# Patient Record
Sex: FEMALE | Race: BLACK OR AFRICAN AMERICAN | NOT HISPANIC OR LATINO
[De-identification: names, ages, dates, MRNs, and addresses within clinical notes are randomized per-mention and may not be internally consistent; named-entity substitution may affect disease eponyms.]

---

## 2022-07-20 ENCOUNTER — APPOINTMENT (OUTPATIENT)
Dept: THORACIC SURGERY | Facility: CLINIC | Age: 67
End: 2022-07-20

## 2022-07-20 VITALS
HEART RATE: 102 BPM | OXYGEN SATURATION: 92 % | RESPIRATION RATE: 16 BRPM | DIASTOLIC BLOOD PRESSURE: 79 MMHG | BODY MASS INDEX: 25.52 KG/M2 | HEIGHT: 63 IN | WEIGHT: 144 LBS | SYSTOLIC BLOOD PRESSURE: 121 MMHG

## 2022-07-20 DIAGNOSIS — Z86.79 PERSONAL HISTORY OF OTHER DISEASES OF THE CIRCULATORY SYSTEM: ICD-10-CM

## 2022-07-20 DIAGNOSIS — Z82.49 FAMILY HISTORY OF ISCHEMIC HEART DISEASE AND OTHER DISEASES OF THE CIRCULATORY SYSTEM: ICD-10-CM

## 2022-07-20 DIAGNOSIS — Z87.891 PERSONAL HISTORY OF NICOTINE DEPENDENCE: ICD-10-CM

## 2022-07-20 PROCEDURE — 99245 OFF/OP CONSLTJ NEW/EST HI 55: CPT

## 2022-07-20 RX ORDER — LOSARTAN POTASSIUM 100 MG/1
100 TABLET, FILM COATED ORAL
Refills: 0 | Status: ACTIVE | COMMUNITY

## 2022-07-22 ENCOUNTER — OUTPATIENT (OUTPATIENT)
Dept: OUTPATIENT SERVICES | Facility: HOSPITAL | Age: 67
LOS: 1 days | End: 2022-07-22

## 2022-07-22 VITALS
HEIGHT: 63 IN | OXYGEN SATURATION: 97 % | WEIGHT: 141.1 LBS | DIASTOLIC BLOOD PRESSURE: 71 MMHG | RESPIRATION RATE: 16 BRPM | TEMPERATURE: 98 F | SYSTOLIC BLOOD PRESSURE: 112 MMHG | HEART RATE: 83 BPM

## 2022-07-22 DIAGNOSIS — Z90.49 ACQUIRED ABSENCE OF OTHER SPECIFIED PARTS OF DIGESTIVE TRACT: Chronic | ICD-10-CM

## 2022-07-22 DIAGNOSIS — J98.59 OTHER DISEASES OF MEDIASTINUM, NOT ELSEWHERE CLASSIFIED: ICD-10-CM

## 2022-07-22 DIAGNOSIS — E05.90 THYROTOXICOSIS, UNSPECIFIED WITHOUT THYROTOXIC CRISIS OR STORM: ICD-10-CM

## 2022-07-22 DIAGNOSIS — R91.8 OTHER NONSPECIFIC ABNORMAL FINDING OF LUNG FIELD: ICD-10-CM

## 2022-07-22 LAB
ANION GAP SERPL CALC-SCNC: 10 MMOL/L — SIGNIFICANT CHANGE UP (ref 7–14)
BLD GP AB SCN SERPL QL: NEGATIVE — SIGNIFICANT CHANGE UP
BUN SERPL-MCNC: 21 MG/DL — SIGNIFICANT CHANGE UP (ref 7–23)
CALCIUM SERPL-MCNC: 9.6 MG/DL — SIGNIFICANT CHANGE UP (ref 8.4–10.5)
CHLORIDE SERPL-SCNC: 99 MMOL/L — SIGNIFICANT CHANGE UP (ref 98–107)
CO2 SERPL-SCNC: 27 MMOL/L — SIGNIFICANT CHANGE UP (ref 22–31)
CREAT SERPL-MCNC: 0.98 MG/DL — SIGNIFICANT CHANGE UP (ref 0.5–1.3)
EGFR: 64 ML/MIN/1.73M2 — SIGNIFICANT CHANGE UP
GLUCOSE SERPL-MCNC: 88 MG/DL — SIGNIFICANT CHANGE UP (ref 70–99)
HCT VFR BLD CALC: 41.9 % — SIGNIFICANT CHANGE UP (ref 34.5–45)
HGB BLD-MCNC: 13.5 G/DL — SIGNIFICANT CHANGE UP (ref 11.5–15.5)
MCHC RBC-ENTMCNC: 30.1 PG — SIGNIFICANT CHANGE UP (ref 27–34)
MCHC RBC-ENTMCNC: 32.2 GM/DL — SIGNIFICANT CHANGE UP (ref 32–36)
MCV RBC AUTO: 93.3 FL — SIGNIFICANT CHANGE UP (ref 80–100)
NRBC # BLD: 0 /100 WBCS — SIGNIFICANT CHANGE UP
NRBC # FLD: 0 K/UL — SIGNIFICANT CHANGE UP
PLATELET # BLD AUTO: 279 K/UL — SIGNIFICANT CHANGE UP (ref 150–400)
POTASSIUM SERPL-MCNC: 4.4 MMOL/L — SIGNIFICANT CHANGE UP (ref 3.5–5.3)
POTASSIUM SERPL-SCNC: 4.4 MMOL/L — SIGNIFICANT CHANGE UP (ref 3.5–5.3)
RBC # BLD: 4.49 M/UL — SIGNIFICANT CHANGE UP (ref 3.8–5.2)
RBC # FLD: 13.7 % — SIGNIFICANT CHANGE UP (ref 10.3–14.5)
RH IG SCN BLD-IMP: POSITIVE — SIGNIFICANT CHANGE UP
SODIUM SERPL-SCNC: 136 MMOL/L — SIGNIFICANT CHANGE UP (ref 135–145)
WBC # BLD: 7.82 K/UL — SIGNIFICANT CHANGE UP (ref 3.8–10.5)
WBC # FLD AUTO: 7.82 K/UL — SIGNIFICANT CHANGE UP (ref 3.8–10.5)

## 2022-07-22 PROCEDURE — 93010 ELECTROCARDIOGRAM REPORT: CPT

## 2022-07-22 RX ORDER — SODIUM CHLORIDE 9 MG/ML
1000 INJECTION, SOLUTION INTRAVENOUS
Refills: 0 | Status: DISCONTINUED | OUTPATIENT
Start: 2022-07-29 | End: 2022-08-12

## 2022-07-22 NOTE — H&P PST ADULT - HISTORY OF PRESENT ILLNESS
Pt. is a 67 yo female that had unexplained weight loss.  During evaluation a lung mass was detected.

## 2022-07-22 NOTE — H&P PST ADULT - PROBLEM SELECTOR PLAN 1
Pt. is scheduled for a bronchoscopy, EBUS biopsy with cytology, possible mediastinoscopy 7/29/22.  Pt. verbalized understanding of instructions and that Chlorhexidine is for external use.

## 2022-07-22 NOTE — H&P PST ADULT - MS GEN HX ROS MEA POS PC
Hypotensive and tachycardic  Currently on Levophed   Lt femoral A-line placed right knee pain/arthritis

## 2022-07-22 NOTE — H&P PST ADULT - PROBLEM SELECTOR PLAN 2
Pt. is on Methimazole.  Pt. had TFT's drawn 4/2022.  Pt. states they're usually drawn every 3 months.  Pt. to be evaluated by PMD to have TFT's drawn.  Pt. states she will see her PMD 7/25/22.

## 2022-07-25 NOTE — HISTORY OF PRESENT ILLNESS
[FreeTextEntry1] : Ms. LEXII KC, 66 year old female, Former Smoker (1 PPD x 40 years; quit June, 2022), w/ hx of HTN, Hyperthyroidism (On Methimazole), Thyroid nodules. June, 2022, presented to PCP ( Dr. Supa Liriano) for neck swelling as well as 10lb weight loss over the past year. Workup demonstrating Large mediastinal mass measuring up to 7 cm extending into the right hilum and right upper lobe parenchyma. Mass narrows and encases the lobar right superior PA , segmental branches and SVC. Furthermore, There is abutment of the aortic arch branches by the mass. Referred to office for surgical evaluation. \par \par CT Chest on 7/7/22:\par - Large 6.1 x 7 x 6.9 cm mediastinal mass extending into the right hilum and right upper lung parenchyma \par - Narrowing and encasement of the RUL pulmonary artery and segmental and subsegmental branches by the mass (Series 602, Image 64)\par mass extends into the superior mediastinum and abuts the brachiocephalic artery and left common carotid artery without emily invasion (Series 7, image 34)\par - There is encasement of the superior vena cava (Series 602, image 72)\par -  5 x 4 mm soft tissue within the RUL bronchus (4, 137), possibly extension from the right mediastinal/hilar mass. There is narrowing of the upper segmental bronchi anteriorly. \par * Multiple pulmonary nodules: \par - 1.4 x 1.1 cm posterior RUL (3, 135)\par - 0.4 cm RUL (3, 90)\par - 0.3 RUL (3, 79)\par - 0.4 cm LLL (3, 172)\par - 0.4 LLL (3, 193)\par \par Patient presents to office for evaluation.  Today, patient denies worsening SOB, chest pain, cough, hemoptysis, fever, chills, night sweats, lightheadedness or dizziness.\par

## 2022-07-25 NOTE — ASSESSMENT
[FreeTextEntry1] : Ms. LEXII KC, 66 year old female, Former Smoker (1 PPD x 40 years; quit June, 2022), w/ hx of HTN, Hyperthyroidism (On Methimazole), Thyroid nodules. June, 2022, presented to PCP ( Dr. Supa Liriano) for neck swelling as well as 10lb weight loss over the past year. Workup demonstrating Large mediastinal mass measuring up to 7 cm extending into the right hilum and right upper lobe parenchyma. Mass narrows and encases the lobar right superior PA , segmental branches and SVC. Furthermore, There is abutment of the aortic arch branches by the mass. Referred to office for surgical evaluation. \par \par I have independently reviewed the medical records and imaging at the time of this office consultation. CT findings suspicious for malignancy, possible advanced lung cancer. Discussed Bronch EBUS, Possible Mediastinoscopy for tissue diagnosis. Risks, benefits and alternatives explained to patient; All questions answered and patient agrees to proceed with surgery. Medical clearance required prior. Additionally, Discussed Brain MRI w/wo contrast as well as PET/CT to complete staging workup. Patient is claustrophobic and refusing Brain MRI. Will obtain CT head w/ IV contrast instead. She is agreeable.\par \par Recommendations reviewed with patient during this office visit, and all questions answered; Patient instructed on the importance of follow up and verbalizes understanding.\par \par I personally performed the services described in the documentation, reviewed the documentation recorded by the scribe in my presence and it accurately and completely records my words and actions.\par \par I, ZELDA Serrano-C, am scribing for and the presence of LUIS Corral, the following sections HISTORY OF PRESENT ILLNESS, PAST MEDICAL/FAMILY/SOCIAL HISTORY; REVIEW OF SYSTEMS; VITAL SIGNS; PHYSICAL EXAM; DISPOSITION.\par \par \par \par

## 2022-07-25 NOTE — PHYSICAL EXAM
[Fully active, able to carry on all pre-disease performance without restriction] : Status 0 - Fully active, able to carry on all pre-disease performance without restriction [General Appearance - Alert] : alert [General Appearance - In No Acute Distress] : in no acute distress [General Appearance - Well Nourished] : well nourished [Sclera] : the sclera and conjunctiva were normal [PERRL With Normal Accommodation] : pupils were equal in size, round, and reactive to light [Extraocular Movements] : extraocular movements were intact [Outer Ear] : the ears and nose were normal in appearance [Hearing Threshold Finger Rub Not Braxton] : hearing was normal [] : no respiratory distress [Respiration, Rhythm And Depth] : normal respiratory rhythm and effort [Exaggerated Use Of Accessory Muscles For Inspiration] : no accessory muscle use [Auscultation Breath Sounds / Voice Sounds] : lungs were clear to auscultation bilaterally [Heart Rate And Rhythm] : heart rate was normal and rhythm regular [Examination Of The Chest] : the chest was normal in appearance [Chest Visual Inspection Thoracic Asymmetry] : no chest asymmetry [Diminished Respiratory Excursion] : normal chest expansion [2+] : left 2+ [Breast Appearance] : normal in appearance [Breast Palpation Mass] : no palpable masses [Bowel Sounds] : normal bowel sounds [Abdomen Soft] : soft [Abdomen Tenderness] : non-tender [Cervical Lymph Nodes Enlarged Posterior Bilaterally] : posterior cervical [Cervical Lymph Nodes Enlarged Anterior Bilaterally] : anterior cervical [Supraclavicular Lymph Nodes Enlarged Bilaterally] : supraclavicular [No CVA Tenderness] : no ~M costovertebral angle tenderness [No Spinal Tenderness] : no spinal tenderness [Abnormal Walk] : normal gait [Involuntary Movements] : no involuntary movements were seen [Musculoskeletal - Swelling] : no joint swelling seen [Skin Color & Pigmentation] : normal skin color and pigmentation [No Focal Deficits] : no focal deficits [Oriented To Time, Place, And Person] : oriented to person, place, and time [FreeTextEntry1] : Deferred

## 2022-07-27 ENCOUNTER — NON-APPOINTMENT (OUTPATIENT)
Age: 67
End: 2022-07-27

## 2022-07-28 NOTE — ASU PATIENT PROFILE, ADULT - FALL HARM RISK - UNIVERSAL INTERVENTIONS
Bed in lowest position, wheels locked, appropriate side rails in place/Call bell, personal items and telephone in reach/Instruct patient to call for assistance before getting out of bed or chair/Non-slip footwear when patient is out of bed/[place] to call system/Physically safe environment - no spills, clutter or unnecessary equipment/Purposeful Proactive Rounding/Room/bathroom lighting operational, light cord in reach

## 2022-07-29 ENCOUNTER — RESULT REVIEW (OUTPATIENT)
Age: 67
End: 2022-07-29

## 2022-07-29 ENCOUNTER — TRANSCRIPTION ENCOUNTER (OUTPATIENT)
Age: 67
End: 2022-07-29

## 2022-07-29 ENCOUNTER — OUTPATIENT (OUTPATIENT)
Dept: OUTPATIENT SERVICES | Facility: HOSPITAL | Age: 67
LOS: 1 days | Discharge: HOME | End: 2022-07-29

## 2022-07-29 ENCOUNTER — APPOINTMENT (OUTPATIENT)
Dept: THORACIC SURGERY | Facility: HOSPITAL | Age: 67
End: 2022-07-29

## 2022-07-29 VITALS
DIASTOLIC BLOOD PRESSURE: 80 MMHG | SYSTOLIC BLOOD PRESSURE: 132 MMHG | HEIGHT: 63 IN | HEART RATE: 95 BPM | WEIGHT: 141.1 LBS | RESPIRATION RATE: 14 BRPM | OXYGEN SATURATION: 98 % | TEMPERATURE: 98 F

## 2022-07-29 VITALS
HEART RATE: 71 BPM | SYSTOLIC BLOOD PRESSURE: 104 MMHG | RESPIRATION RATE: 16 BRPM | OXYGEN SATURATION: 95 % | DIASTOLIC BLOOD PRESSURE: 75 MMHG

## 2022-07-29 DIAGNOSIS — J98.59 OTHER DISEASES OF MEDIASTINUM, NOT ELSEWHERE CLASSIFIED: ICD-10-CM

## 2022-07-29 DIAGNOSIS — Z90.49 ACQUIRED ABSENCE OF OTHER SPECIFIED PARTS OF DIGESTIVE TRACT: Chronic | ICD-10-CM

## 2022-07-29 LAB — RH IG SCN BLD-IMP: POSITIVE — SIGNIFICANT CHANGE UP

## 2022-07-29 PROCEDURE — 88305 TISSUE EXAM BY PATHOLOGIST: CPT | Mod: 26

## 2022-07-29 PROCEDURE — 88341 IMHCHEM/IMCYTCHM EA ADD ANTB: CPT | Mod: 26,59

## 2022-07-29 PROCEDURE — ZZZZZ: CPT

## 2022-07-29 PROCEDURE — 88173 CYTOPATH EVAL FNA REPORT: CPT | Mod: 26,59

## 2022-07-29 PROCEDURE — 88342 IMHCHEM/IMCYTCHM 1ST ANTB: CPT | Mod: 26,59

## 2022-07-29 PROCEDURE — 88305 TISSUE EXAM BY PATHOLOGIST: CPT | Mod: 26,59

## 2022-07-29 PROCEDURE — 88104 CYTOPATH FL NONGYN SMEARS: CPT | Mod: 26

## 2022-07-29 PROCEDURE — 88360 TUMOR IMMUNOHISTOCHEM/MANUAL: CPT | Mod: 26

## 2022-07-29 PROCEDURE — 31623 DX BRONCHOSCOPE/BRUSH: CPT

## 2022-07-29 PROCEDURE — 31628 BRONCHOSCOPY/LUNG BX EACH: CPT

## 2022-07-29 PROCEDURE — 31652 BRONCH EBUS SAMPLNG 1/2 NODE: CPT

## 2022-07-29 PROCEDURE — 71045 X-RAY EXAM CHEST 1 VIEW: CPT | Mod: 26

## 2022-07-29 PROCEDURE — 88360 TUMOR IMMUNOHISTOCHEM/MANUAL: CPT | Mod: 26,59

## 2022-07-29 RX ORDER — FENTANYL CITRATE 50 UG/ML
50 INJECTION INTRAVENOUS
Refills: 0 | Status: DISCONTINUED | OUTPATIENT
Start: 2022-07-29 | End: 2022-07-29

## 2022-07-29 RX ORDER — ONDANSETRON 8 MG/1
4 TABLET, FILM COATED ORAL ONCE
Refills: 0 | Status: DISCONTINUED | OUTPATIENT
Start: 2022-07-29 | End: 2022-08-12

## 2022-07-29 RX ORDER — FENTANYL CITRATE 50 UG/ML
25 INJECTION INTRAVENOUS
Refills: 0 | Status: DISCONTINUED | OUTPATIENT
Start: 2022-07-29 | End: 2022-07-29

## 2022-07-29 RX ORDER — OXYCODONE HYDROCHLORIDE 5 MG/1
5 TABLET ORAL ONCE
Refills: 0 | Status: DISCONTINUED | OUTPATIENT
Start: 2022-07-29 | End: 2022-07-29

## 2022-07-29 NOTE — ASU DISCHARGE PLAN (ADULT/PEDIATRIC) - ASU DC SPECIAL INSTRUCTIONSFT
It is normal to cough up small amount of blood after today's procedure. If you start to cough up large amounts of blood, call the office immediately and/or go to the emergency room.    Please call Dr. Quintero's office ([phone]) to schedule an appointment for 10 days from your procedure today.

## 2022-07-29 NOTE — ASU DISCHARGE PLAN (ADULT/PEDIATRIC) - FOLLOW UP APPOINTMENTS
388 may also call Recovery Room (PACU) 24/7 @ [phone]/[facility]

## 2022-07-29 NOTE — ASU DISCHARGE PLAN (ADULT/PEDIATRIC) - NS MD DC FALL RISK RISK
For information on Fall & Injury Prevention, visit: [email or web address] OR  [email or web address] OR  https://www.cdc.gov/steadi/patient.html

## 2022-07-29 NOTE — ASU DISCHARGE PLAN (ADULT/PEDIATRIC) - CARE PROVIDER_API CALL
Ortiz Quintero)  Thoracic Surgery  [address] [place] [address]  Phone: [phone]  Fax: [phone]  Follow Up Time: Routine

## 2022-07-29 NOTE — BRIEF OPERATIVE NOTE - OPERATION/FINDINGS
Flexible bronchoscopy with brushing and biopsy of endobronchial lesion  EBUS, Flexible bronchoscopy with transbronchial fine needle aspiration of pretracheal adenopathy

## 2022-07-29 NOTE — BRIEF OPERATIVE NOTE - NSICDXBRIEFPROCEDURE_GEN_ALL_CORE_FT
PROCEDURES:  Flexible bronchoscopy with brush biopsy 29-Jul-2022 14:44:11 Flexible bronchoscopy with brushing and biopsy of endobronchial lesion Abbi Allen  EBUS, with fine needle aspiration 29-Jul-2022 14:45:03  Abbi Allen

## 2022-08-03 LAB
NON-GYNECOLOGICAL CYTOLOGY STUDY: SIGNIFICANT CHANGE UP
SURGICAL PATHOLOGY STUDY: SIGNIFICANT CHANGE UP

## 2022-08-09 ENCOUNTER — NON-APPOINTMENT (OUTPATIENT)
Age: 67
End: 2022-08-09

## 2022-08-10 ENCOUNTER — APPOINTMENT (OUTPATIENT)
Dept: THORACIC SURGERY | Facility: CLINIC | Age: 67
End: 2022-08-10

## 2022-08-10 VITALS
WEIGHT: 144 LBS | SYSTOLIC BLOOD PRESSURE: 156 MMHG | RESPIRATION RATE: 18 BRPM | HEIGHT: 63 IN | HEART RATE: 86 BPM | DIASTOLIC BLOOD PRESSURE: 82 MMHG | OXYGEN SATURATION: 94 % | BODY MASS INDEX: 25.52 KG/M2

## 2022-08-10 DIAGNOSIS — J98.59 OTHER DISEASES OF MEDIASTINUM, NOT ELSEWHERE CLASSIFIED: ICD-10-CM

## 2022-08-10 PROBLEM — Z00.00 ENCOUNTER FOR PREVENTIVE HEALTH EXAMINATION: Noted: 2022-08-10

## 2022-08-10 PROCEDURE — 99215 OFFICE O/P EST HI 40 MIN: CPT

## 2022-08-11 ENCOUNTER — OUTPATIENT (OUTPATIENT)
Dept: OUTPATIENT SERVICES | Facility: HOSPITAL | Age: 67
LOS: 1 days | Discharge: HOME | End: 2022-08-11

## 2022-08-11 DIAGNOSIS — Z90.49 ACQUIRED ABSENCE OF OTHER SPECIFIED PARTS OF DIGESTIVE TRACT: Chronic | ICD-10-CM

## 2022-08-11 DIAGNOSIS — C34.90 MALIGNANT NEOPLASM OF UNSPECIFIED PART OF UNSPECIFIED BRONCHUS OR LUNG: ICD-10-CM

## 2022-08-12 ENCOUNTER — RESULT REVIEW (OUTPATIENT)
Age: 67
End: 2022-08-12

## 2022-08-12 ENCOUNTER — NON-APPOINTMENT (OUTPATIENT)
Age: 67
End: 2022-08-12

## 2022-08-12 ENCOUNTER — APPOINTMENT (OUTPATIENT)
Dept: HEMATOLOGY ONCOLOGY | Facility: CLINIC | Age: 67
End: 2022-08-12

## 2022-08-12 VITALS
OXYGEN SATURATION: 92 % | BODY MASS INDEX: 24.92 KG/M2 | RESPIRATION RATE: 18 BRPM | DIASTOLIC BLOOD PRESSURE: 90 MMHG | HEART RATE: 94 BPM | TEMPERATURE: 97.3 F | HEIGHT: 62.99 IN | WEIGHT: 140.65 LBS | SYSTOLIC BLOOD PRESSURE: 154 MMHG

## 2022-08-12 PROCEDURE — 99205 OFFICE O/P NEW HI 60 MIN: CPT

## 2022-08-12 PROCEDURE — G2212 PROLONG OUTPT/OFFICE VIS: CPT

## 2022-08-12 NOTE — HISTORY OF PRESENT ILLNESS
[FreeTextEntry1] : Ms. LEXII KC, 66 year old female, Former Smoker (1 PPD x 40 years; quit June, 2022), w/ hx of HTN, Hyperthyroidism (On Methimazole), Thyroid nodules. June, 2022, presented to PCP ( Dr. Supa Liriano) for neck swelling as well as 10lb weight loss over the past year. Workup demonstrating Large mediastinal mass measuring up to 7 cm extending into the right hilum and right upper lobe parenchyma. Mass narrows and encases the lobar right superior PA , segmental branches and SVC. Furthermore, There is abutment of the aortic arch branches by the mass. Referred to office for surgical evaluation. \par \par Now, s/p Flexible bronchoscopy, brushing and biopsy of right upper lobe endobronchial lesion, EBUS  biopsy of mediastinal lymph nodes on 7/29/22. Path of right upper lobe biopsy demonstrating Small cell carcinoma. Tumor is positive for TTF-1 , synaptophysin, and CAM 5.2 while negative for p40. Ki-67 is high (>50%). Path of Pretracheal LN positive for metastatic small cell carcinoma; Path of RUL BAL Positive for small cell carcinoma. \par \par OF NOTE: Peer to peer performed. PET/CT denied. Will need to undergo CT chest abdo pelvis first. If negative, than can undergo PET/CT to prove localized disease. Since patient had CT Chest in July, Was approved for abdo and pelvis w/ IV contrast. CT Head with IV contrast approved  because patient is claustrophobic and won't be able to undergo Brain MRI.\par \par Patient presents to office for post procedure follow up.\par \par

## 2022-08-12 NOTE — DATA REVIEWED
[FreeTextEntry1] : CT Chest on 7/7/22:\par - Large 6.1 x 7 x 6.9 cm mediastinal mass extending into the right hilum and right upper lung parenchyma \par - Narrowing and encasement of the RUL pulmonary artery and segmental and subsegmental branches by the mass (Series 602, Image 64)\par mass extends into the superior mediastinum and abuts the brachiocephalic artery and left common carotid artery without emily invasion (Series 7, image 34)\par - There is encasement of the superior vena cava (Series 602, image 72)\par -  5 x 4 mm soft tissue within the RUL bronchus (4, 137), possibly extension from the right mediastinal/hilar mass. There is narrowing of the upper segmental bronchi anteriorly. \par * Multiple pulmonary nodules: \par - 1.4 x 1.1 cm posterior RUL (3, 135)\par - 0.4 cm RUL (3, 90)\par - 0.3 RUL (3, 79)\par - 0.4 cm LLL (3, 172)\par - 0.4 LLL (3, 193)

## 2022-08-12 NOTE — CONSULT LETTER
[Dear  ___] : Dear  [unfilled], [Courtesy Letter:] : I had the pleasure of seeing your patient, [unfilled], in my office today. [Please see my note below.] : Please see my note below. [Sincerely,] : Sincerely, [FreeTextEntry2] : Dr. Supa Liriano (Ref) [FreeTextEntry3] : Ortiz Quintero MD, FACS \par Vice Chairperson, Thoracic Surgery, [facility] Department of Cardiovascular & Thoracic Surgery \par , [facility] [employer] [facility]\par \par \par

## 2022-08-12 NOTE — ASSESSMENT
[FreeTextEntry1] : Ms. LEXII KC, 66 year old female s/p Flexible bronchoscopy, brushing and biopsy of right upper lobe endobronchial lesion, EBUS  biopsy of mediastinal lymph nodes on 7/29/22. Path of right upper lobe biopsy demonstrating Small cell carcinoma. Tumor is positive for TTF-1 , synaptophysin, and CAM 5.2 while negative for p40. Ki-67 is high (>50%). Path of Pretracheal LN positive for metastatic small cell carcinoma; Path of RUL BAL Positive for small cell carcinoma. \par \par Path reviewed with pt, metastatic small cell carcinoma of lung, she is not a surgical candidate. I will refer pt to [facility] Dr. Sharma or Dr. Chou for chemotherapy. RTC as needed. \par \par \par I personally performed the services described in the documentation, reviewed the documentation recorded by the scribe in my presence and it accurately and completely records my words and actions. \par \par I, Radha Colbert NP, am scribing for and the presence of LUIS Corral, the following sections HISTORY OF PRESENT ILLNESS, PAST MEDICAL/FAMILY/SOCIAL HISTORY; REVIEW OF SYSTEMS; VITAL SIGNS; PHYSICAL EXAM; DISPOSITION.\par

## 2022-08-14 LAB
ALBUMIN SERPL ELPH-MCNC: 4.6 G/DL
ALP BLD-CCNC: 50 U/L
ALT SERPL-CCNC: 9 U/L
ANION GAP SERPL CALC-SCNC: 13 MMOL/L
AST SERPL-CCNC: 14 U/L
BILIRUB SERPL-MCNC: 0.6 MG/DL
BUN SERPL-MCNC: 17 MG/DL
CALCIUM SERPL-MCNC: 10.1 MG/DL
CHLORIDE SERPL-SCNC: 102 MMOL/L
CO2 SERPL-SCNC: 23 MMOL/L
CREAT SERPL-MCNC: 1.1 MG/DL
EGFR: 56 ML/MIN/1.73M2
GLUCOSE SERPL-MCNC: 98 MG/DL
HBV CORE IGG+IGM SER QL: NONREACTIVE
HBV SURFACE AB SER QL: NONREACTIVE
HBV SURFACE AG SER QL: NONREACTIVE
HCV AB SER QL: NONREACTIVE
HCV S/CO RATIO: 0.11 S/CO
LDH SERPL-CCNC: 237 U/L
POTASSIUM SERPL-SCNC: 5 MMOL/L
PROT SERPL-MCNC: 7.5 G/DL
SODIUM SERPL-SCNC: 138 MMOL/L
T4 FREE SERPL-MCNC: 0.9 NG/DL
TSH SERPL-ACNC: 2.39 UIU/ML

## 2022-08-15 PROBLEM — I10 ESSENTIAL (PRIMARY) HYPERTENSION: Chronic | Status: ACTIVE | Noted: 2022-07-22

## 2022-08-15 PROBLEM — E05.90 THYROTOXICOSIS, UNSPECIFIED WITHOUT THYROTOXIC CRISIS OR STORM: Chronic | Status: ACTIVE | Noted: 2022-07-22

## 2022-08-15 LAB
BASOPHILS # BLD AUTO: 0.07 K/UL — SIGNIFICANT CHANGE UP (ref 0–0.2)
BASOPHILS NFR BLD AUTO: 0.8 % — SIGNIFICANT CHANGE UP (ref 0–2)
EOSINOPHIL # BLD AUTO: 0.12 K/UL — SIGNIFICANT CHANGE UP (ref 0–0.5)
EOSINOPHIL NFR BLD AUTO: 1.4 % — SIGNIFICANT CHANGE UP (ref 0–6)
HCT VFR BLD CALC: 39.2 % — SIGNIFICANT CHANGE UP (ref 34.5–45)
HGB BLD-MCNC: 12.9 G/DL — SIGNIFICANT CHANGE UP (ref 11.5–15.5)
IMM GRANULOCYTES NFR BLD AUTO: 0.2 % — SIGNIFICANT CHANGE UP (ref 0–1.5)
LYMPHOCYTES # BLD AUTO: 2.57 K/UL — SIGNIFICANT CHANGE UP (ref 1–3.3)
LYMPHOCYTES # BLD AUTO: 29.2 % — SIGNIFICANT CHANGE UP (ref 13–44)
MCHC RBC-ENTMCNC: 30.1 PG — SIGNIFICANT CHANGE UP (ref 27–34)
MCHC RBC-ENTMCNC: 32.9 G/DL — SIGNIFICANT CHANGE UP (ref 32–36)
MCV RBC AUTO: 91.6 FL — SIGNIFICANT CHANGE UP (ref 80–100)
MONOCYTES # BLD AUTO: 1.11 K/UL — HIGH (ref 0–0.9)
MONOCYTES NFR BLD AUTO: 12.6 % — SIGNIFICANT CHANGE UP (ref 2–14)
NEUTROPHILS # BLD AUTO: 4.92 K/UL — SIGNIFICANT CHANGE UP (ref 1.8–7.4)
NEUTROPHILS NFR BLD AUTO: 55.8 % — SIGNIFICANT CHANGE UP (ref 43–77)
NRBC # BLD: 0 /100 WBCS — SIGNIFICANT CHANGE UP (ref 0–0)
PLATELET # BLD AUTO: 297 K/UL — SIGNIFICANT CHANGE UP (ref 150–400)
RBC # BLD: 4.28 M/UL — SIGNIFICANT CHANGE UP (ref 3.8–5.2)
RBC # FLD: 14 % — SIGNIFICANT CHANGE UP (ref 10.3–14.5)
WBC # BLD: 8.81 K/UL — SIGNIFICANT CHANGE UP (ref 3.8–10.5)
WBC # FLD AUTO: 8.81 K/UL — SIGNIFICANT CHANGE UP (ref 3.8–10.5)

## 2022-08-16 ENCOUNTER — RESULT REVIEW (OUTPATIENT)
Age: 67
End: 2022-08-16

## 2022-08-16 ENCOUNTER — APPOINTMENT (OUTPATIENT)
Dept: INFUSION THERAPY | Facility: HOSPITAL | Age: 67
End: 2022-08-16

## 2022-08-16 ENCOUNTER — NON-APPOINTMENT (OUTPATIENT)
Age: 67
End: 2022-08-16

## 2022-08-16 DIAGNOSIS — Z51.11 ENCOUNTER FOR ANTINEOPLASTIC CHEMOTHERAPY: ICD-10-CM

## 2022-08-16 DIAGNOSIS — R11.2 NAUSEA WITH VOMITING, UNSPECIFIED: ICD-10-CM

## 2022-08-16 LAB
BASOPHILS # BLD AUTO: 0 K/UL — SIGNIFICANT CHANGE UP (ref 0–0.2)
BASOPHILS NFR BLD AUTO: 0 % — SIGNIFICANT CHANGE UP (ref 0–2)
EOSINOPHIL # BLD AUTO: 0.12 K/UL — SIGNIFICANT CHANGE UP (ref 0–0.5)
EOSINOPHIL NFR BLD AUTO: 1 % — SIGNIFICANT CHANGE UP (ref 0–6)
HCT VFR BLD CALC: 37.7 % — SIGNIFICANT CHANGE UP (ref 34.5–45)
HGB BLD-MCNC: 12.9 G/DL — SIGNIFICANT CHANGE UP (ref 11.5–15.5)
LYMPHOCYTES # BLD AUTO: 19 % — SIGNIFICANT CHANGE UP (ref 13–44)
LYMPHOCYTES # BLD AUTO: 2.19 K/UL — SIGNIFICANT CHANGE UP (ref 1–3.3)
MCHC RBC-ENTMCNC: 31.1 PG — SIGNIFICANT CHANGE UP (ref 27–34)
MCHC RBC-ENTMCNC: 34.2 G/DL — SIGNIFICANT CHANGE UP (ref 32–36)
MCV RBC AUTO: 90.8 FL — SIGNIFICANT CHANGE UP (ref 80–100)
MONOCYTES # BLD AUTO: 1.38 K/UL — HIGH (ref 0–0.9)
MONOCYTES NFR BLD AUTO: 12 % — SIGNIFICANT CHANGE UP (ref 2–14)
NEUTROPHILS # BLD AUTO: 7.82 K/UL — HIGH (ref 1.8–7.4)
NEUTROPHILS NFR BLD AUTO: 68 % — SIGNIFICANT CHANGE UP (ref 43–77)
NRBC # BLD: 0 /100 — SIGNIFICANT CHANGE UP (ref 0–0)
NRBC # BLD: SIGNIFICANT CHANGE UP /100 WBCS (ref 0–0)
PLAT MORPH BLD: NORMAL — SIGNIFICANT CHANGE UP
PLATELET # BLD AUTO: 297 K/UL — SIGNIFICANT CHANGE UP (ref 150–400)
RBC # BLD: 4.15 M/UL — SIGNIFICANT CHANGE UP (ref 3.8–5.2)
RBC # FLD: 13.5 % — SIGNIFICANT CHANGE UP (ref 10.3–14.5)
RBC BLD AUTO: SIGNIFICANT CHANGE UP
WBC # BLD: 11.5 K/UL — HIGH (ref 3.8–10.5)
WBC # FLD AUTO: 11.5 K/UL — HIGH (ref 3.8–10.5)

## 2022-08-16 RX ORDER — METOCLOPRAMIDE 10 MG/1
10 TABLET ORAL
Qty: 60 | Refills: 5 | Status: ACTIVE | COMMUNITY
Start: 2022-08-16 | End: 1900-01-01

## 2022-08-16 NOTE — PHYSICAL EXAM
[de-identified] : No icterus [de-identified] : MMM O/P Clear [de-identified] : Fullness to lower anterior neck [de-identified] : Clear [de-identified] : S1 S2 [de-identified] : No edema [de-identified] : Soft NT/ND No masses [de-identified] : No spine/CVA tenderness

## 2022-08-16 NOTE — HISTORY OF PRESENT ILLNESS
[de-identified] : 66F with extensive smoking history, who quit in July 2022, noted weight loss of about 25 pounds over the past 7-8 months.  She does report a history of hyperthyroidism and attributed the weight loss to that at first.  In late May 2022, her family noted neck swelling.  She saw her PCP and was sent for a thyroid ultrasound which was unremarkable per the patient.  Due to the weight loss, she was sent for a CXR that was abnormal.  This led to a CT chest performed in early July 2022 revealing a large right hilar/mediastinal mass measuring up to 7 cm with bilateral pulmonary nodules.  She was referred to thoracic surgery.  She underwent EBUS/bronchoscopy in late July 2022 with biopsy of the RUL and pretracheal lymph node revealing small cell carcinoma.\par \par She does report a dry cough over the past year.  Denies shortness of breath.  No significant pain.\par \par All other ROS otherwise as outlined or noncontributory. [de-identified] : – Lung, RUL biopsy and pretracheal EBUS guided FNA 7/29/2022: Positive for malignant cells.  Small cell carcinoma.

## 2022-08-16 NOTE — RESULTS/DATA
[FreeTextEntry1] : Images reviewed/interpreted and discussed with radiologist:\par \par – CT chest 7/7/2022: Large mediastinal mass measuring up to 7.0 cm extending into the right hilum and RUL parenchyma.  Differential considerations include a primary lung malignancy with mediastinal invasion versus primary mediastinal mass with extension into the lung parenchyma.  Mass narrows and encases the lobar right superior pulmonary artery and segmental branches and SVC.  There is abutment of the aortic arch branches by the mass.  Multiple bilateral pulmonary nodules measuring up to 1.4 cm.  \par \par – Labs 7/1/2022: Serum creatinine 1.1.\par \par

## 2022-08-17 ENCOUNTER — RESULT REVIEW (OUTPATIENT)
Age: 67
End: 2022-08-17

## 2022-08-17 ENCOUNTER — APPOINTMENT (OUTPATIENT)
Dept: INFUSION THERAPY | Facility: HOSPITAL | Age: 67
End: 2022-08-17

## 2022-08-17 LAB
ANION GAP SERPL CALC-SCNC: 10 MMOL/L — SIGNIFICANT CHANGE UP (ref 5–17)
BUN SERPL-MCNC: 18 MG/DL — SIGNIFICANT CHANGE UP (ref 7–23)
CALCIUM SERPL-MCNC: 9.2 MG/DL — SIGNIFICANT CHANGE UP (ref 8.4–10.5)
CHLORIDE SERPL-SCNC: 104 MMOL/L — SIGNIFICANT CHANGE UP (ref 96–108)
CO2 SERPL-SCNC: 25 MMOL/L — SIGNIFICANT CHANGE UP (ref 22–31)
CREAT SERPL-MCNC: 1.2 MG/DL — SIGNIFICANT CHANGE UP (ref 0.5–1.3)
EGFR: 51 ML/MIN/1.73M2 — LOW
GLUCOSE SERPL-MCNC: 132 MG/DL — HIGH (ref 70–99)
LDH SERPL L TO P-CCNC: 197 U/L — SIGNIFICANT CHANGE UP (ref 50–242)
PHOSPHATE SERPL-MCNC: 3.5 MG/DL — SIGNIFICANT CHANGE UP (ref 2.5–4.5)
POTASSIUM SERPL-MCNC: 3.8 MMOL/L — SIGNIFICANT CHANGE UP (ref 3.5–5.3)
POTASSIUM SERPL-SCNC: 3.8 MMOL/L — SIGNIFICANT CHANGE UP (ref 3.5–5.3)
SODIUM SERPL-SCNC: 140 MMOL/L — SIGNIFICANT CHANGE UP (ref 135–145)
URATE SERPL-MCNC: 6.5 MG/DL — SIGNIFICANT CHANGE UP (ref 2.5–7)

## 2022-08-18 ENCOUNTER — RESULT REVIEW (OUTPATIENT)
Age: 67
End: 2022-08-18

## 2022-08-18 ENCOUNTER — APPOINTMENT (OUTPATIENT)
Dept: INFUSION THERAPY | Facility: HOSPITAL | Age: 67
End: 2022-08-18

## 2022-08-18 LAB
ANION GAP SERPL CALC-SCNC: 14 MMOL/L — SIGNIFICANT CHANGE UP (ref 5–17)
BASOPHILS # BLD AUTO: 0.07 K/UL — SIGNIFICANT CHANGE UP (ref 0–0.2)
BASOPHILS NFR BLD AUTO: 0.7 % — SIGNIFICANT CHANGE UP (ref 0–2)
BUN SERPL-MCNC: 20 MG/DL — SIGNIFICANT CHANGE UP (ref 7–23)
CALCIUM SERPL-MCNC: 9.3 MG/DL — SIGNIFICANT CHANGE UP (ref 8.4–10.5)
CHLORIDE SERPL-SCNC: 102 MMOL/L — SIGNIFICANT CHANGE UP (ref 96–108)
CO2 SERPL-SCNC: 23 MMOL/L — SIGNIFICANT CHANGE UP (ref 22–31)
CREAT SERPL-MCNC: 1.21 MG/DL — SIGNIFICANT CHANGE UP (ref 0.5–1.3)
EGFR: 50 ML/MIN/1.73M2 — LOW
EOSINOPHIL # BLD AUTO: 0.15 K/UL — SIGNIFICANT CHANGE UP (ref 0–0.5)
EOSINOPHIL NFR BLD AUTO: 1.4 % — SIGNIFICANT CHANGE UP (ref 0–6)
GLUCOSE SERPL-MCNC: 107 MG/DL — HIGH (ref 70–99)
HCT VFR BLD CALC: 34.8 % — SIGNIFICANT CHANGE UP (ref 34.5–45)
HGB BLD-MCNC: 11.7 G/DL — SIGNIFICANT CHANGE UP (ref 11.5–15.5)
IMM GRANULOCYTES NFR BLD AUTO: 0.4 % — SIGNIFICANT CHANGE UP (ref 0–1.5)
LDH SERPL L TO P-CCNC: 227 U/L — SIGNIFICANT CHANGE UP (ref 50–242)
LYMPHOCYTES # BLD AUTO: 2.28 K/UL — SIGNIFICANT CHANGE UP (ref 1–3.3)
LYMPHOCYTES # BLD AUTO: 21.8 % — SIGNIFICANT CHANGE UP (ref 13–44)
MCHC RBC-ENTMCNC: 30.4 PG — SIGNIFICANT CHANGE UP (ref 27–34)
MCHC RBC-ENTMCNC: 33.6 G/DL — SIGNIFICANT CHANGE UP (ref 32–36)
MCV RBC AUTO: 90.4 FL — SIGNIFICANT CHANGE UP (ref 80–100)
MONOCYTES # BLD AUTO: 0.84 K/UL — SIGNIFICANT CHANGE UP (ref 0–0.9)
MONOCYTES NFR BLD AUTO: 8 % — SIGNIFICANT CHANGE UP (ref 2–14)
NEUTROPHILS # BLD AUTO: 7.06 K/UL — SIGNIFICANT CHANGE UP (ref 1.8–7.4)
NEUTROPHILS NFR BLD AUTO: 67.7 % — SIGNIFICANT CHANGE UP (ref 43–77)
NRBC # BLD: 0 /100 WBCS — SIGNIFICANT CHANGE UP (ref 0–0)
PHOSPHATE SERPL-MCNC: 4.2 MG/DL — SIGNIFICANT CHANGE UP (ref 2.5–4.5)
PLATELET # BLD AUTO: 247 K/UL — SIGNIFICANT CHANGE UP (ref 150–400)
POTASSIUM SERPL-MCNC: 4.3 MMOL/L — SIGNIFICANT CHANGE UP (ref 3.5–5.3)
POTASSIUM SERPL-SCNC: 4.3 MMOL/L — SIGNIFICANT CHANGE UP (ref 3.5–5.3)
RBC # BLD: 3.85 M/UL — SIGNIFICANT CHANGE UP (ref 3.8–5.2)
RBC # FLD: 14 % — SIGNIFICANT CHANGE UP (ref 10.3–14.5)
SODIUM SERPL-SCNC: 138 MMOL/L — SIGNIFICANT CHANGE UP (ref 135–145)
URATE SERPL-MCNC: 6.4 MG/DL — SIGNIFICANT CHANGE UP (ref 2.5–7)
WBC # BLD: 10.44 K/UL — SIGNIFICANT CHANGE UP (ref 3.8–10.5)
WBC # FLD AUTO: 10.44 K/UL — SIGNIFICANT CHANGE UP (ref 3.8–10.5)

## 2022-08-19 ENCOUNTER — APPOINTMENT (OUTPATIENT)
Dept: CT IMAGING | Facility: IMAGING CENTER | Age: 67
End: 2022-08-19

## 2022-08-19 ENCOUNTER — RESULT REVIEW (OUTPATIENT)
Age: 67
End: 2022-08-19

## 2022-08-19 ENCOUNTER — OUTPATIENT (OUTPATIENT)
Dept: OUTPATIENT SERVICES | Facility: HOSPITAL | Age: 67
LOS: 1 days | End: 2022-08-19
Payer: COMMERCIAL

## 2022-08-19 DIAGNOSIS — Z90.49 ACQUIRED ABSENCE OF OTHER SPECIFIED PARTS OF DIGESTIVE TRACT: Chronic | ICD-10-CM

## 2022-08-19 DIAGNOSIS — C34.90 MALIGNANT NEOPLASM OF UNSPECIFIED PART OF UNSPECIFIED BRONCHUS OR LUNG: ICD-10-CM

## 2022-08-19 PROCEDURE — 74177 CT ABD & PELVIS W/CONTRAST: CPT | Mod: 26

## 2022-08-19 PROCEDURE — 74177 CT ABD & PELVIS W/CONTRAST: CPT

## 2022-08-30 ENCOUNTER — APPOINTMENT (OUTPATIENT)
Dept: HEMATOLOGY ONCOLOGY | Facility: CLINIC | Age: 67
End: 2022-08-30

## 2022-09-02 ENCOUNTER — RESULT REVIEW (OUTPATIENT)
Age: 67
End: 2022-09-02

## 2022-09-02 ENCOUNTER — APPOINTMENT (OUTPATIENT)
Dept: HEMATOLOGY ONCOLOGY | Facility: CLINIC | Age: 67
End: 2022-09-02

## 2022-09-02 VITALS
SYSTOLIC BLOOD PRESSURE: 131 MMHG | DIASTOLIC BLOOD PRESSURE: 77 MMHG | OXYGEN SATURATION: 96 % | WEIGHT: 141.74 LBS | TEMPERATURE: 97.3 F | BODY MASS INDEX: 25.11 KG/M2 | HEIGHT: 63 IN | RESPIRATION RATE: 16 BRPM | HEART RATE: 89 BPM

## 2022-09-02 LAB
BASOPHILS # BLD AUTO: 0 K/UL — SIGNIFICANT CHANGE UP (ref 0–0.2)
BASOPHILS NFR BLD AUTO: 0 % — SIGNIFICANT CHANGE UP (ref 0–2)
EOSINOPHIL # BLD AUTO: 0 K/UL — SIGNIFICANT CHANGE UP (ref 0–0.5)
EOSINOPHIL NFR BLD AUTO: 0 % — SIGNIFICANT CHANGE UP (ref 0–6)
HCT VFR BLD CALC: 36.9 % — SIGNIFICANT CHANGE UP (ref 34.5–45)
HGB BLD-MCNC: 11.9 G/DL — SIGNIFICANT CHANGE UP (ref 11.5–15.5)
LYMPHOCYTES # BLD AUTO: 3.15 K/UL — SIGNIFICANT CHANGE UP (ref 1–3.3)
LYMPHOCYTES # BLD AUTO: 53 % — HIGH (ref 13–44)
MCHC RBC-ENTMCNC: 30 PG — SIGNIFICANT CHANGE UP (ref 27–34)
MCHC RBC-ENTMCNC: 32.2 G/DL — SIGNIFICANT CHANGE UP (ref 32–36)
MCV RBC AUTO: 92.9 FL — SIGNIFICANT CHANGE UP (ref 80–100)
MONOCYTES # BLD AUTO: 1.01 K/UL — HIGH (ref 0–0.9)
MONOCYTES NFR BLD AUTO: 17 % — HIGH (ref 2–14)
NEUTROPHILS # BLD AUTO: 1.78 K/UL — LOW (ref 1.8–7.4)
NEUTROPHILS NFR BLD AUTO: 30 % — LOW (ref 43–77)
NRBC # BLD: 0 /100 — SIGNIFICANT CHANGE UP (ref 0–0)
NRBC # BLD: SIGNIFICANT CHANGE UP /100 WBCS (ref 0–0)
PLAT MORPH BLD: NORMAL — SIGNIFICANT CHANGE UP
PLATELET # BLD AUTO: 235 K/UL — SIGNIFICANT CHANGE UP (ref 150–400)
RBC # BLD: 3.97 M/UL — SIGNIFICANT CHANGE UP (ref 3.8–5.2)
RBC # FLD: 14 % — SIGNIFICANT CHANGE UP (ref 10.3–14.5)
RBC BLD AUTO: SIGNIFICANT CHANGE UP
WBC # BLD: 5.94 K/UL — SIGNIFICANT CHANGE UP (ref 3.8–10.5)
WBC # FLD AUTO: 5.94 K/UL — SIGNIFICANT CHANGE UP (ref 3.8–10.5)

## 2022-09-02 PROCEDURE — 99214 OFFICE O/P EST MOD 30 MIN: CPT

## 2022-09-05 NOTE — PHYSICAL EXAM
[Fully active, able to carry on all pre-disease performance without restriction] : Status 0 - Fully active, able to carry on all pre-disease performance without restriction [Normal] : affect appropriate [de-identified] : No icterus [de-identified] : MMM O/P Clear [de-identified] : Fullness to lower anterior neck [de-identified] : Clear [de-identified] : S1 S2 [de-identified] : No edema [de-identified] : Soft NT/ND No masses [de-identified] : No spine/CVA tenderness

## 2022-09-05 NOTE — HISTORY OF PRESENT ILLNESS
[Disease: _____________________] : Disease: [unfilled] [T: ___] : T[unfilled] [N: ___] : N[unfilled] [M: ___] : M[unfilled] [AJCC Stage: ____] : AJCC Stage: [unfilled] [de-identified] : 66F with extensive smoking history, who quit in July 2022, noted weight loss of about 25 pounds over the past 7-8 months.  She does report a history of hyperthyroidism and attributed the weight loss to that at first.  In late May 2022, her family noted neck swelling.  She saw her PCP and was sent for a thyroid ultrasound which was unremarkable per the patient.  Due to the weight loss, she was sent for a CXR that was abnormal.  This led to a CT chest performed in early July 2022 revealing a large right hilar/mediastinal mass measuring up to 7 cm with bilateral pulmonary nodules.  She was referred to thoracic surgery.  She underwent EBUS/bronchoscopy in late July 2022 with biopsy of the RUL and pretracheal lymph node revealing small cell carcinoma. Began 1st line Carbo/Etoposide/Atezo/Cosela in mid August 2022. \par \par  [de-identified] : – Lung, RUL biopsy and pretracheal EBUS guided FNA 7/29/2022: Positive for malignant cells.  Small cell carcinoma. [de-identified] : Patient is s/p C1 Carbo/Etoposide/Atezolizumab/Cosela 8/16-18. \par \par She reports tolerating treatment well. Denies N/V/D/C or fatigue. She has a continued non bothersome dry cough for which she has used no meds.\par

## 2022-09-05 NOTE — RESULTS/DATA
[FreeTextEntry1] : Images reviewed/interpreted and discussed with radiologist:\par \par – CT chest 7/7/2022: Large mediastinal mass measuring up to 7.0 cm extending into the right hilum and RUL parenchyma.  Differential considerations include a primary lung malignancy with mediastinal invasion versus primary mediastinal mass with extension into the lung parenchyma.  Mass narrows and encases the lobar right superior pulmonary artery and segmental branches and SVC.  There is abutment of the aortic arch branches by the mass.  Multiple bilateral pulmonary nodules measuring up to 1.4 cm.  \par \par – Labs 7/1/2022: Serum creatinine 1.1.\par \par -CT A/P 8/16: no evidence of metastatic disease in the abdomen or pelvis\par \par \par \par

## 2022-09-05 NOTE — ASSESSMENT
[Palliative] : Goals of care discussed with patient: Palliative [FreeTextEntry1] : Small cell lung cancer that appears to be extensive stage based on the bilateral lung nodules which I suspect are metastatic.  Discussed with the patient that her disease is not curable however systemic therapy can be employed with the goal of prolonging survival.  Began 1st line Carbo/Etoposide/Atezo in mid August 2022. Recommend:\par – Pre treatment CT of her abdomen/pelvis to complete her staging work-up with no disease noted.  \par – Encouraged patient to obtain MRI brain at a stand up facility (list of facilities given)  to complete her staging work up\par – Continue Carboplatin AUC 5 on Day 1 and Etoposide 80 mg/m2 on Days 1–3 and Atezolizumab 1200 mg on Day 1 administered IV every 3 weeks.  Should the patient develop a reaction to Etoposide, Etopophos will be utilized.\par – Administer the myeloprotective agent trilaciclib 240 mg/m2 on Days 1–3 IV pre-chemotherapy\par – May need to incorporate PEGfilgrastim or biosimilar equivalent to reduce the risk of chemotherapy-induced neutropenic complications; this agent will be held during C1 given the trilaciclib administration\par – As part of the informed consent conversation, discussed the potential autoimmune adverse effects from immunotherapy; her hyperthyroidism is noted and for which the underlying etiology is not clear, whether this is autoimmune or not.\par – Despite her suspected extensive stage status, may incorporate thoracic RT either palliatively for local control of her large primary tumor depending on response to systemic therapy or in a consolidative fashion during the course of her treatment\par – We will obtain a restaging CT scan following 2 cycles of therapy to assess response.  If responding, plan to treat the patient with a total of 4 cycles of the combination chemotherapy and immunotherapy followed by planned immunotherapy maintenance\par –Cough: has benzonatate on hand if needed. \par –Continue follow-up midcycle to assess tolerability\par -Appointments scheduled appropriately\par

## 2022-09-06 LAB
ALBUMIN SERPL ELPH-MCNC: 4.4 G/DL
ALP BLD-CCNC: 54 U/L
ALT SERPL-CCNC: 10 U/L
ANION GAP SERPL CALC-SCNC: 11 MMOL/L
AST SERPL-CCNC: 15 U/L
BILIRUB SERPL-MCNC: 0.2 MG/DL
BUN SERPL-MCNC: 22 MG/DL
CALCIUM SERPL-MCNC: 9.7 MG/DL
CHLORIDE SERPL-SCNC: 100 MMOL/L
CO2 SERPL-SCNC: 26 MMOL/L
CREAT SERPL-MCNC: 1.21 MG/DL
EGFR: 50 ML/MIN/1.73M2
GLUCOSE SERPL-MCNC: 96 MG/DL
LDH SERPL-CCNC: 211 U/L
POTASSIUM SERPL-SCNC: 5.6 MMOL/L
PROT SERPL-MCNC: 7.5 G/DL
SODIUM SERPL-SCNC: 137 MMOL/L

## 2022-09-07 ENCOUNTER — RESULT REVIEW (OUTPATIENT)
Age: 67
End: 2022-09-07

## 2022-09-07 ENCOUNTER — APPOINTMENT (OUTPATIENT)
Dept: INFUSION THERAPY | Facility: HOSPITAL | Age: 67
End: 2022-09-07

## 2022-09-07 LAB
BASOPHILS # BLD AUTO: 0.11 K/UL — SIGNIFICANT CHANGE UP (ref 0–0.2)
BASOPHILS NFR BLD AUTO: 1.5 % — SIGNIFICANT CHANGE UP (ref 0–2)
EOSINOPHIL # BLD AUTO: 0.11 K/UL — SIGNIFICANT CHANGE UP (ref 0–0.5)
EOSINOPHIL NFR BLD AUTO: 1.5 % — SIGNIFICANT CHANGE UP (ref 0–6)
HCT VFR BLD CALC: 37.4 % — SIGNIFICANT CHANGE UP (ref 34.5–45)
HGB BLD-MCNC: 12.8 G/DL — SIGNIFICANT CHANGE UP (ref 11.5–15.5)
IMM GRANULOCYTES NFR BLD AUTO: 0.6 % — SIGNIFICANT CHANGE UP (ref 0–1.5)
LYMPHOCYTES # BLD AUTO: 2.11 K/UL — SIGNIFICANT CHANGE UP (ref 1–3.3)
LYMPHOCYTES # BLD AUTO: 29.6 % — SIGNIFICANT CHANGE UP (ref 13–44)
MCHC RBC-ENTMCNC: 30.9 PG — SIGNIFICANT CHANGE UP (ref 27–34)
MCHC RBC-ENTMCNC: 34.2 G/DL — SIGNIFICANT CHANGE UP (ref 32–36)
MCV RBC AUTO: 90.3 FL — SIGNIFICANT CHANGE UP (ref 80–100)
MONOCYTES # BLD AUTO: 1.58 K/UL — HIGH (ref 0–0.9)
MONOCYTES NFR BLD AUTO: 22.1 % — HIGH (ref 2–14)
NEUTROPHILS # BLD AUTO: 3.19 K/UL — SIGNIFICANT CHANGE UP (ref 1.8–7.4)
NEUTROPHILS NFR BLD AUTO: 44.7 % — SIGNIFICANT CHANGE UP (ref 43–77)
NRBC # BLD: 0 /100 WBCS — SIGNIFICANT CHANGE UP (ref 0–0)
PLATELET # BLD AUTO: 353 K/UL — SIGNIFICANT CHANGE UP (ref 150–400)
RBC # BLD: 4.14 M/UL — SIGNIFICANT CHANGE UP (ref 3.8–5.2)
RBC # FLD: 14.3 % — SIGNIFICANT CHANGE UP (ref 10.3–14.5)
WBC # BLD: 7.14 K/UL — SIGNIFICANT CHANGE UP (ref 3.8–10.5)
WBC # FLD AUTO: 7.14 K/UL — SIGNIFICANT CHANGE UP (ref 3.8–10.5)

## 2022-09-08 ENCOUNTER — APPOINTMENT (OUTPATIENT)
Dept: INFUSION THERAPY | Facility: HOSPITAL | Age: 67
End: 2022-09-08

## 2022-09-09 ENCOUNTER — APPOINTMENT (OUTPATIENT)
Dept: INFUSION THERAPY | Facility: HOSPITAL | Age: 67
End: 2022-09-09

## 2022-09-21 ENCOUNTER — RESULT REVIEW (OUTPATIENT)
Age: 67
End: 2022-09-21

## 2022-09-21 ENCOUNTER — APPOINTMENT (OUTPATIENT)
Dept: HEMATOLOGY ONCOLOGY | Facility: CLINIC | Age: 67
End: 2022-09-21

## 2022-09-21 VITALS
DIASTOLIC BLOOD PRESSURE: 73 MMHG | HEIGHT: 62.99 IN | BODY MASS INDEX: 25.27 KG/M2 | HEART RATE: 62 BPM | TEMPERATURE: 97.2 F | SYSTOLIC BLOOD PRESSURE: 115 MMHG | RESPIRATION RATE: 16 BRPM | WEIGHT: 142.64 LBS | OXYGEN SATURATION: 99 %

## 2022-09-21 LAB
ALBUMIN SERPL ELPH-MCNC: 4.5 G/DL
ALP BLD-CCNC: 58 U/L
ALT SERPL-CCNC: 13 U/L
ANION GAP SERPL CALC-SCNC: 10 MMOL/L
AST SERPL-CCNC: 18 U/L
BASOPHILS # BLD AUTO: 0.1 K/UL — SIGNIFICANT CHANGE UP (ref 0–0.2)
BASOPHILS NFR BLD AUTO: 1.6 % — SIGNIFICANT CHANGE UP (ref 0–2)
BILIRUB SERPL-MCNC: 0.3 MG/DL
BUN SERPL-MCNC: 17 MG/DL
CALCIUM SERPL-MCNC: 9.9 MG/DL
CHLORIDE SERPL-SCNC: 102 MMOL/L
CO2 SERPL-SCNC: 25 MMOL/L
CREAT SERPL-MCNC: 1.1 MG/DL
EGFR: 56 ML/MIN/1.73M2
EOSINOPHIL # BLD AUTO: 0.12 K/UL — SIGNIFICANT CHANGE UP (ref 0–0.5)
EOSINOPHIL NFR BLD AUTO: 1.9 % — SIGNIFICANT CHANGE UP (ref 0–6)
GLUCOSE SERPL-MCNC: 97 MG/DL
HCT VFR BLD CALC: 33.1 % — LOW (ref 34.5–45)
HGB BLD-MCNC: 11 G/DL — LOW (ref 11.5–15.5)
IMM GRANULOCYTES NFR BLD AUTO: 0.3 % — SIGNIFICANT CHANGE UP (ref 0–0.9)
LYMPHOCYTES # BLD AUTO: 2.19 K/UL — SIGNIFICANT CHANGE UP (ref 1–3.3)
LYMPHOCYTES # BLD AUTO: 35 % — SIGNIFICANT CHANGE UP (ref 13–44)
MCHC RBC-ENTMCNC: 30.6 PG — SIGNIFICANT CHANGE UP (ref 27–34)
MCHC RBC-ENTMCNC: 33.2 G/DL — SIGNIFICANT CHANGE UP (ref 32–36)
MCV RBC AUTO: 91.9 FL — SIGNIFICANT CHANGE UP (ref 80–100)
MONOCYTES # BLD AUTO: 1.16 K/UL — HIGH (ref 0–0.9)
MONOCYTES NFR BLD AUTO: 18.6 % — HIGH (ref 2–14)
NEUTROPHILS # BLD AUTO: 2.66 K/UL — SIGNIFICANT CHANGE UP (ref 1.8–7.4)
NEUTROPHILS NFR BLD AUTO: 42.6 % — LOW (ref 43–77)
NRBC # BLD: 0 /100 WBCS — SIGNIFICANT CHANGE UP (ref 0–0)
PLATELET # BLD AUTO: 243 K/UL — SIGNIFICANT CHANGE UP (ref 150–400)
POTASSIUM SERPL-SCNC: 5.3 MMOL/L
PROT SERPL-MCNC: 7 G/DL
RBC # BLD: 3.6 M/UL — LOW (ref 3.8–5.2)
RBC # FLD: 14.7 % — HIGH (ref 10.3–14.5)
SODIUM SERPL-SCNC: 137 MMOL/L
WBC # BLD: 6.25 K/UL — SIGNIFICANT CHANGE UP (ref 3.8–10.5)
WBC # FLD AUTO: 6.25 K/UL — SIGNIFICANT CHANGE UP (ref 3.8–10.5)

## 2022-09-21 PROCEDURE — 99214 OFFICE O/P EST MOD 30 MIN: CPT

## 2022-09-26 ENCOUNTER — OUTPATIENT (OUTPATIENT)
Dept: OUTPATIENT SERVICES | Facility: HOSPITAL | Age: 67
LOS: 1 days | End: 2022-09-26
Payer: COMMERCIAL

## 2022-09-26 ENCOUNTER — APPOINTMENT (OUTPATIENT)
Dept: CT IMAGING | Facility: IMAGING CENTER | Age: 67
End: 2022-09-26

## 2022-09-26 DIAGNOSIS — Z90.49 ACQUIRED ABSENCE OF OTHER SPECIFIED PARTS OF DIGESTIVE TRACT: Chronic | ICD-10-CM

## 2022-09-26 DIAGNOSIS — C34.01 MALIGNANT NEOPLASM OF RIGHT MAIN BRONCHUS: ICD-10-CM

## 2022-09-26 PROCEDURE — 71260 CT THORAX DX C+: CPT

## 2022-09-26 PROCEDURE — 71260 CT THORAX DX C+: CPT | Mod: 26

## 2022-09-26 PROCEDURE — 74177 CT ABD & PELVIS W/CONTRAST: CPT

## 2022-09-26 PROCEDURE — 74177 CT ABD & PELVIS W/CONTRAST: CPT | Mod: 26

## 2022-09-27 ENCOUNTER — RESULT REVIEW (OUTPATIENT)
Age: 67
End: 2022-09-27

## 2022-09-27 ENCOUNTER — NON-APPOINTMENT (OUTPATIENT)
Age: 67
End: 2022-09-27

## 2022-09-27 ENCOUNTER — APPOINTMENT (OUTPATIENT)
Dept: HEMATOLOGY ONCOLOGY | Facility: CLINIC | Age: 67
End: 2022-09-27

## 2022-09-27 ENCOUNTER — APPOINTMENT (OUTPATIENT)
Dept: INFUSION THERAPY | Facility: HOSPITAL | Age: 67
End: 2022-09-27

## 2022-09-27 VITALS
HEART RATE: 97 BPM | TEMPERATURE: 97.2 F | WEIGHT: 142.42 LBS | BODY MASS INDEX: 25.24 KG/M2 | RESPIRATION RATE: 16 BRPM | OXYGEN SATURATION: 96 % | SYSTOLIC BLOOD PRESSURE: 135 MMHG | DIASTOLIC BLOOD PRESSURE: 79 MMHG

## 2022-09-27 LAB
BASOPHILS # BLD AUTO: 0.15 K/UL — SIGNIFICANT CHANGE UP (ref 0–0.2)
BASOPHILS NFR BLD AUTO: 2.2 % — HIGH (ref 0–2)
EOSINOPHIL # BLD AUTO: 0.27 K/UL — SIGNIFICANT CHANGE UP (ref 0–0.5)
EOSINOPHIL NFR BLD AUTO: 4 % — SIGNIFICANT CHANGE UP (ref 0–6)
HCT VFR BLD CALC: 35.5 % — SIGNIFICANT CHANGE UP (ref 34.5–45)
HGB BLD-MCNC: 11.8 G/DL — SIGNIFICANT CHANGE UP (ref 11.5–15.5)
IMM GRANULOCYTES NFR BLD AUTO: 0.6 % — SIGNIFICANT CHANGE UP (ref 0–0.9)
LYMPHOCYTES # BLD AUTO: 1.76 K/UL — SIGNIFICANT CHANGE UP (ref 1–3.3)
LYMPHOCYTES # BLD AUTO: 26 % — SIGNIFICANT CHANGE UP (ref 13–44)
MCHC RBC-ENTMCNC: 30.2 PG — SIGNIFICANT CHANGE UP (ref 27–34)
MCHC RBC-ENTMCNC: 33.2 G/DL — SIGNIFICANT CHANGE UP (ref 32–36)
MCV RBC AUTO: 90.8 FL — SIGNIFICANT CHANGE UP (ref 80–100)
MONOCYTES # BLD AUTO: 1.56 K/UL — HIGH (ref 0–0.9)
MONOCYTES NFR BLD AUTO: 23 % — HIGH (ref 2–14)
NEUTROPHILS # BLD AUTO: 3 K/UL — SIGNIFICANT CHANGE UP (ref 1.8–7.4)
NEUTROPHILS NFR BLD AUTO: 44.2 % — SIGNIFICANT CHANGE UP (ref 43–77)
NRBC # BLD: 0 /100 WBCS — SIGNIFICANT CHANGE UP (ref 0–0)
PLATELET # BLD AUTO: 252 K/UL — SIGNIFICANT CHANGE UP (ref 150–400)
RBC # BLD: 3.91 M/UL — SIGNIFICANT CHANGE UP (ref 3.8–5.2)
RBC # FLD: 15.4 % — HIGH (ref 10.3–14.5)
WBC # BLD: 6.78 K/UL — SIGNIFICANT CHANGE UP (ref 3.8–10.5)
WBC # FLD AUTO: 6.78 K/UL — SIGNIFICANT CHANGE UP (ref 3.8–10.5)

## 2022-09-27 PROCEDURE — 99214 OFFICE O/P EST MOD 30 MIN: CPT

## 2022-09-27 NOTE — HISTORY OF PRESENT ILLNESS
[Disease: _____________________] : Disease: [unfilled] [T: ___] : T[unfilled] [N: ___] : N[unfilled] [M: ___] : M[unfilled] [AJCC Stage: ____] : AJCC Stage: [unfilled] [de-identified] : 66F with extensive smoking history, who quit in July 2022, noted weight loss of about 25 pounds over the past 7-8 months.  She does report a history of hyperthyroidism and attributed the weight loss to that at first.  In late May 2022, her family noted neck swelling.  She saw her PCP and was sent for a thyroid ultrasound which was unremarkable per the patient.  Due to the weight loss, she was sent for a CXR that was abnormal.  This led to a CT chest performed in early July 2022 revealing a large right hilar/mediastinal mass measuring up to 7 cm with bilateral pulmonary nodules.  She was referred to thoracic surgery.  She underwent EBUS/bronchoscopy in late July 2022 with biopsy of the RUL and pretracheal lymph node revealing small cell carcinoma. Began 1st line Carbo/Etoposide/Atezo/Cosela in mid August 2022. \par \par  [de-identified] : – Lung, RUL biopsy and pretracheal EBUS guided FNA 7/29/2022: Positive for malignant cells.  Small cell carcinoma. [de-identified] : Patient is s/p C2 Carbo/Etoposide/Atezolizumab/Cosela 9/7-9.. \par \par She reports tolerating treatment well. Denies N/V/D/C or fatigue. She states that her cough has resolved.\par

## 2022-09-27 NOTE — HISTORY OF PRESENT ILLNESS
[Disease: _____________________] : Disease: [unfilled] [T: ___] : T[unfilled] [N: ___] : N[unfilled] [M: ___] : M[unfilled] [AJCC Stage: ____] : AJCC Stage: [unfilled] [de-identified] : 66F with extensive smoking history, who quit in July 2022, noted weight loss of about 25 pounds over the past 7-8 months.  She does report a history of hyperthyroidism and attributed the weight loss to that at first.  In late May 2022, her family noted neck swelling.  She saw her PCP and was sent for a thyroid ultrasound which was unremarkable per the patient.  Due to the weight loss, she was sent for a CXR that was abnormal.  This led to a CT chest performed in early July 2022 revealing a large right hilar/mediastinal mass measuring up to 7 cm with bilateral pulmonary nodules.  She was referred to thoracic surgery.  She underwent EBUS/bronchoscopy in late July 2022 with biopsy of the RUL and pretracheal lymph node revealing small cell carcinoma.  Initial staging CT revealed an 11cm partially calcified left adnexal mass felt to represent an exophytic fibroid.  She was felt to have extensive stage disease based on the lung nodules felt to represent metastases.  Began first line Carbo/Etoposide/Atezolizumab in August 2022; achieved MA.   \par \par  [de-identified] : – Lung, RUL biopsy and pretracheal EBUS guided FNA 7/29/2022: Positive for malignant cells.  Small cell carcinoma. [de-identified] : Patient presents prior to start of C3 of first-line Carboplatin/Etoposide/Atezolizumab. \par She has experienced alopecia.  Overall tolerating therapy well; denies F/C/N/V/D or constipation.  Cough resolved since starting therapy.  \par She has not yet informed her family members of her diagnosis or treatment; encouraged her to do so.  Her son is a PA and I discussed my concern that family members might be upset that they were not informed or involved in her care or decision-making.  She said she plans to discuss with them but does not want to trouble them.  \par \par Restaging CT with DE to interval therapy.  \par There was a Head CT ordered by Thoracic Surgery at time of her initial visit with med-onc, as she was refusing a brain MRI; I do not see that the head CT ever happened but she now has authorization for a brain MRI but has not gone for head imaging to complete her staging work-up yet and reports needing to go to an open facility.

## 2022-09-27 NOTE — PHYSICAL EXAM
[Fully active, able to carry on all pre-disease performance without restriction] : Status 0 - Fully active, able to carry on all pre-disease performance without restriction [Normal] : affect appropriate [de-identified] : No icterus [de-identified] : MMM O/P Clear [de-identified] : Fullness to lower anterior neck [de-identified] : Clear [de-identified] : S1 S2 [de-identified] : No edema [de-identified] : Soft NT/ND No masses [de-identified] : No spine/CVA tenderness

## 2022-09-27 NOTE — PHYSICAL EXAM
[Fully active, able to carry on all pre-disease performance without restriction] : Status 0 - Fully active, able to carry on all pre-disease performance without restriction [Normal] : affect appropriate [de-identified] : No icterus [de-identified] : MMM O/P Clear [de-identified] : supple No LAD [de-identified] : Clear [de-identified] : S1 S2 [de-identified] : No edema [de-identified] : No spine/CVA tenderness

## 2022-09-27 NOTE — ASSESSMENT
[FreeTextEntry1] : Extensive stage small cell lung cancer.  Began first line chemo + immunotherapy with Carboplatin/Etoposide/Atezolizumab in August 2022; achieved [place]. Recommend:\par – Continue with first line therapy as scheduled.  For C3 starting today.  Plan to treat for 4 cycles followed by Atezolizumab maintenance.   \par – Continue with trilaciclib administered pre-chemotherapy for myeloprotection\par – Encouraged patient to obtain MRI brain ASAP.  Again provided her a list of open facilities.  This is necessary to complete her staging work up\par –She appears to have a left adnexal fibroid mass unrelated to her cancer diagnosis\par – Can consider role of incorporating consolidative thoracic RT during the course of her treatment\par – F/U following C3 mid-cycle or sooner should problems arise\par –Appointments scheduled appropriately

## 2022-09-27 NOTE — ASSESSMENT
[Palliative] : Goals of care discussed with patient: Palliative [FreeTextEntry1] : Small cell lung cancer that appears to be extensive stage based on the bilateral lung nodules which I suspect are metastatic.  Discussed with the patient that her disease is not curable however systemic therapy can be employed with the goal of prolonging survival.  Began 1st line Carbo/Etoposide/Atezo in mid August 2022. Recommend:\par – Pre treatment CT of her abdomen/pelvis to complete her staging work-up with no disease noted.  \par – Encouraged patient to obtain MRI brain at a stand up facility (list of facilities given)  to complete her staging work up\par – Continue Carboplatin AUC 5 on Day 1 and Etoposide 80 mg/m2 on Days 1–3 and Atezolizumab 1200 mg on Day 1 administered IV every 3 weeks.  Should the patient develop a reaction to Etoposide, Etopophos will be utilized.\par – Administer the myeloprotective agent trilaciclib 240 mg/m2 on Days 1–3 IV pre-chemotherapy\par – May need to incorporate PEGfilgrastim or biosimilar equivalent to reduce the risk of chemotherapy-induced neutropenic complications; this agent will be held during C1 given the trilaciclib administration\par – As part of the informed consent conversation, discussed the potential autoimmune adverse effects from immunotherapy; her hyperthyroidism is noted and for which the underlying etiology is not clear, whether this is autoimmune or not.\par – Despite her suspected extensive stage status, may incorporate thoracic RT either palliatively for local control of her large primary tumor depending on response to systemic therapy or in a consolidative fashion during the course of her treatment\par – Imaging in late Sept s/p C2 with f/u for review or sooner if needed. If responding, plan to treat the patient with a total of 4 cycles of the combination chemotherapy and immunotherapy followed by planned immunotherapy maintenance\par –Cough: Currently resolved; has benzonatate on hand if needed. \par –Continue follow-up midcycle to assess tolerability\par -Appointments scheduled appropriately\par

## 2022-09-27 NOTE — RESULTS/DATA
[FreeTextEntry1] : – CT chest 7/7/2022: Large mediastinal mass measuring up to 7.0 cm extending into the right hilum and RUL parenchyma.  Differential considerations include a primary lung malignancy with mediastinal invasion versus primary mediastinal mass with extension into the lung parenchyma.  Mass narrows and encases the lobar right superior pulmonary artery and segmental branches and SVC.  There is abutment of the aortic arch branches by the mass.  Multiple bilateral pulmonary nodules measuring up to 1.4 cm.  \par -CT A/P 8/16: no evidence of metastatic disease in the abdomen or pelvis\par \par Images Reviewed/Interpreted: \par \par -CT C/A/P 9/26/22:  Comparison is made to outside CT chest July 7, 2022:  \par Favorable response to treatment as evidenced by significant interval decrease in size of right upper lobe pulmonary nodules, and interval decrease in size of multiple mediastinal lymph nodes. Stable 11.3 cm partially-calcified left adnexal mass. Pelvic MRI can be considered to exclude left adnexal pathology if clinically warranted.\par \par

## 2022-09-28 ENCOUNTER — APPOINTMENT (OUTPATIENT)
Dept: INFUSION THERAPY | Facility: HOSPITAL | Age: 67
End: 2022-09-28

## 2022-09-29 ENCOUNTER — NON-APPOINTMENT (OUTPATIENT)
Age: 67
End: 2022-09-29

## 2022-09-29 ENCOUNTER — APPOINTMENT (OUTPATIENT)
Dept: INFUSION THERAPY | Facility: HOSPITAL | Age: 67
End: 2022-09-29

## 2022-10-10 ENCOUNTER — OUTPATIENT (OUTPATIENT)
Dept: OUTPATIENT SERVICES | Facility: HOSPITAL | Age: 67
LOS: 1 days | Discharge: HOME | End: 2022-10-10

## 2022-10-10 DIAGNOSIS — Z90.49 ACQUIRED ABSENCE OF OTHER SPECIFIED PARTS OF DIGESTIVE TRACT: Chronic | ICD-10-CM

## 2022-10-10 DIAGNOSIS — C34.90 MALIGNANT NEOPLASM OF UNSPECIFIED PART OF UNSPECIFIED BRONCHUS OR LUNG: ICD-10-CM

## 2022-10-10 DIAGNOSIS — C34.01 MALIGNANT NEOPLASM OF RIGHT MAIN BRONCHUS: ICD-10-CM

## 2022-10-11 ENCOUNTER — RESULT REVIEW (OUTPATIENT)
Age: 67
End: 2022-10-11

## 2022-10-11 ENCOUNTER — APPOINTMENT (OUTPATIENT)
Dept: HEMATOLOGY ONCOLOGY | Facility: CLINIC | Age: 67
End: 2022-10-11

## 2022-10-11 VITALS
TEMPERATURE: 96.9 F | BODY MASS INDEX: 25.78 KG/M2 | SYSTOLIC BLOOD PRESSURE: 113 MMHG | OXYGEN SATURATION: 98 % | DIASTOLIC BLOOD PRESSURE: 71 MMHG | HEIGHT: 62.99 IN | RESPIRATION RATE: 16 BRPM | WEIGHT: 145.51 LBS | HEART RATE: 91 BPM

## 2022-10-11 LAB
BASOPHILS # BLD AUTO: 0.09 K/UL — SIGNIFICANT CHANGE UP (ref 0–0.2)
BASOPHILS NFR BLD AUTO: 1.2 % — SIGNIFICANT CHANGE UP (ref 0–2)
EOSINOPHIL # BLD AUTO: 0.09 K/UL — SIGNIFICANT CHANGE UP (ref 0–0.5)
EOSINOPHIL NFR BLD AUTO: 1.2 % — SIGNIFICANT CHANGE UP (ref 0–6)
HCT VFR BLD CALC: 31.9 % — LOW (ref 34.5–45)
HGB BLD-MCNC: 10.5 G/DL — LOW (ref 11.5–15.5)
IMM GRANULOCYTES NFR BLD AUTO: 0.3 % — SIGNIFICANT CHANGE UP (ref 0–0.9)
LYMPHOCYTES # BLD AUTO: 2.62 K/UL — SIGNIFICANT CHANGE UP (ref 1–3.3)
LYMPHOCYTES # BLD AUTO: 35.6 % — SIGNIFICANT CHANGE UP (ref 13–44)
MCHC RBC-ENTMCNC: 30.3 PG — SIGNIFICANT CHANGE UP (ref 27–34)
MCHC RBC-ENTMCNC: 32.9 G/DL — SIGNIFICANT CHANGE UP (ref 32–36)
MCV RBC AUTO: 92.2 FL — SIGNIFICANT CHANGE UP (ref 80–100)
MONOCYTES # BLD AUTO: 1.45 K/UL — HIGH (ref 0–0.9)
MONOCYTES NFR BLD AUTO: 19.7 % — HIGH (ref 2–14)
NEUTROPHILS # BLD AUTO: 3.08 K/UL — SIGNIFICANT CHANGE UP (ref 1.8–7.4)
NEUTROPHILS NFR BLD AUTO: 42 % — LOW (ref 43–77)
NRBC # BLD: 0 /100 WBCS — SIGNIFICANT CHANGE UP (ref 0–0)
PLATELET # BLD AUTO: 219 K/UL — SIGNIFICANT CHANGE UP (ref 150–400)
RBC # BLD: 3.46 M/UL — LOW (ref 3.8–5.2)
RBC # FLD: 16.2 % — HIGH (ref 10.3–14.5)
WBC # BLD: 7.35 K/UL — SIGNIFICANT CHANGE UP (ref 3.8–10.5)
WBC # FLD AUTO: 7.35 K/UL — SIGNIFICANT CHANGE UP (ref 3.8–10.5)

## 2022-10-11 PROCEDURE — 99214 OFFICE O/P EST MOD 30 MIN: CPT

## 2022-10-12 LAB
ALBUMIN SERPL ELPH-MCNC: 4.2 G/DL
ALP BLD-CCNC: 61 U/L
ALT SERPL-CCNC: 12 U/L
ANION GAP SERPL CALC-SCNC: 13 MMOL/L
AST SERPL-CCNC: 17 U/L
BILIRUB SERPL-MCNC: 0.3 MG/DL
BUN SERPL-MCNC: 28 MG/DL
CALCIUM SERPL-MCNC: 9.4 MG/DL
CHLORIDE SERPL-SCNC: 102 MMOL/L
CO2 SERPL-SCNC: 23 MMOL/L
CREAT SERPL-MCNC: 1.39 MG/DL
EGFR: 42 ML/MIN/1.73M2
GLUCOSE SERPL-MCNC: 104 MG/DL
LDH SERPL-CCNC: 180 U/L
POTASSIUM SERPL-SCNC: 5.5 MMOL/L
PROT SERPL-MCNC: 7.6 G/DL
SODIUM SERPL-SCNC: 138 MMOL/L
T4 FREE SERPL-MCNC: 1 NG/DL
TSH SERPL-ACNC: 2.06 UIU/ML

## 2022-10-17 NOTE — RESULTS/DATA
[FreeTextEntry1] : – CT chest 7/7/2022: Large mediastinal mass measuring up to 7.0 cm extending into the right hilum and RUL parenchyma.  Differential considerations include a primary lung malignancy with mediastinal invasion versus primary mediastinal mass with extension into the lung parenchyma.  Mass narrows and encases the lobar right superior pulmonary artery and segmental branches and SVC.  There is abutment of the aortic arch branches by the mass.  Multiple bilateral pulmonary nodules measuring up to 1.4 cm.  \par -CT A/P 8/16: no evidence of metastatic disease in the abdomen or pelvis\par \par -CT C/A/P 9/26/22:  Comparison is made to outside CT chest July 7, 2022:  \par Favorable response to treatment as evidenced by significant interval decrease in size of right upper lobe pulmonary nodules, and interval decrease in size of multiple mediastinal lymph nodes. Stable 11.3 cm partially-calcified left adnexal mass. Pelvic MRI can be considered to exclude left adnexal pathology if clinically warranted.\par \par

## 2022-10-17 NOTE — ASSESSMENT
[FreeTextEntry1] : Extensive stage small cell lung cancer.  Began first line chemo + immunotherapy with Carboplatin/Etoposide/Atezolizumab in August 2022; achieved [place]. \par Now s/p C3 on 9/27-9/29.  Recommend:\par – Repeat labs today\par – Continue with first line therapy as scheduled.  For C4 next week.  Plan to treat for 4 cycles followed by Atezolizumab maintenance administered at monthly dosing schedule beginning with C5.   \par – Continue with trilaciclib administered pre-chemotherapy for myeloprotection\par – MRI Brain pending for 10/31\par –She appears to have a left adnexal fibroid mass unrelated to her cancer diagnosis\par – Can consider role of incorporating consolidative thoracic RT during the course of her treatment\par – F/U following C4 mid-cycle or sooner should problems arise\par –Appointments scheduled appropriately

## 2022-10-17 NOTE — PHYSICAL EXAM
[Fully active, able to carry on all pre-disease performance without restriction] : Status 0 - Fully active, able to carry on all pre-disease performance without restriction [Normal] : affect appropriate [de-identified] : No icterus [de-identified] : MMM O/P Clear [de-identified] : supple No LAD [de-identified] : Clear [de-identified] : S1 S2 [de-identified] : No edema [de-identified] : No spine/CVA tenderness

## 2022-10-17 NOTE — HISTORY OF PRESENT ILLNESS
[Disease: _____________________] : Disease: [unfilled] [T: ___] : T[unfilled] [N: ___] : N[unfilled] [M: ___] : M[unfilled] [AJCC Stage: ____] : AJCC Stage: [unfilled] [de-identified] : 66F with extensive smoking history, who quit in July 2022, noted weight loss of about 25 pounds over the past 7-8 months.  She does report a history of hyperthyroidism and attributed the weight loss to that at first.  In late May 2022, her family noted neck swelling.  She saw her PCP and was sent for a thyroid ultrasound which was unremarkable per the patient.  Due to the weight loss, she was sent for a CXR that was abnormal.  This led to a CT chest performed in early July 2022 revealing a large right hilar/mediastinal mass measuring up to 7 cm with bilateral pulmonary nodules.  She was referred to thoracic surgery.  She underwent EBUS/bronchoscopy in late July 2022 with biopsy of the RUL and pretracheal lymph node revealing small cell carcinoma.  Initial staging CT revealed an 11cm partially calcified left adnexal mass felt to represent an exophytic fibroid.  She was felt to have extensive stage disease based on the lung nodules felt to represent metastases.  Began first line Carbo/Etoposide/Atezolizumab in August 2022; achieved [place].   \par \par  [de-identified] : – Lung, RUL biopsy and pretracheal EBUS guided FNA 7/29/2022: Positive for malignant cells.  Small cell carcinoma. [de-identified] : Patient is s/p C3 of first-line Carboplatin/Etoposide/Atezolizumab 9/27/9/29; achieved [place]. \par She continues to tolerate therapy well.  Experienced alopecia.  Denies F/C/N/V/D or constipation.\par She informed her family members of her diagnosis and treatment.\par Diagnosis and management rationale discussed with her daughter on speaker phone during the visit.\par \par Brain MRI scheduled for 10/31 and an open MRI facility.

## 2022-10-18 ENCOUNTER — RESULT REVIEW (OUTPATIENT)
Age: 67
End: 2022-10-18

## 2022-10-18 ENCOUNTER — APPOINTMENT (OUTPATIENT)
Dept: INFUSION THERAPY | Facility: HOSPITAL | Age: 67
End: 2022-10-18

## 2022-10-18 DIAGNOSIS — R11.2 NAUSEA WITH VOMITING, UNSPECIFIED: ICD-10-CM

## 2022-10-18 DIAGNOSIS — Z51.11 ENCOUNTER FOR ANTINEOPLASTIC CHEMOTHERAPY: ICD-10-CM

## 2022-10-18 LAB
ANION GAP SERPL CALC-SCNC: 15 MMOL/L — SIGNIFICANT CHANGE UP (ref 5–17)
BASOPHILS # BLD AUTO: 0.11 K/UL — SIGNIFICANT CHANGE UP (ref 0–0.2)
BASOPHILS NFR BLD AUTO: 1.8 % — SIGNIFICANT CHANGE UP (ref 0–2)
BUN SERPL-MCNC: 16 MG/DL — SIGNIFICANT CHANGE UP (ref 7–23)
CALCIUM SERPL-MCNC: 9.6 MG/DL — SIGNIFICANT CHANGE UP (ref 8.4–10.5)
CHLORIDE SERPL-SCNC: 101 MMOL/L — SIGNIFICANT CHANGE UP (ref 96–108)
CO2 SERPL-SCNC: 22 MMOL/L — SIGNIFICANT CHANGE UP (ref 22–31)
CREAT SERPL-MCNC: 1.08 MG/DL — SIGNIFICANT CHANGE UP (ref 0.5–1.3)
EGFR: 57 ML/MIN/1.73M2 — LOW
EOSINOPHIL # BLD AUTO: 0.12 K/UL — SIGNIFICANT CHANGE UP (ref 0–0.5)
EOSINOPHIL NFR BLD AUTO: 1.9 % — SIGNIFICANT CHANGE UP (ref 0–6)
GLUCOSE SERPL-MCNC: 96 MG/DL — SIGNIFICANT CHANGE UP (ref 70–99)
HCT VFR BLD CALC: 35.5 % — SIGNIFICANT CHANGE UP (ref 34.5–45)
HGB BLD-MCNC: 11.7 G/DL — SIGNIFICANT CHANGE UP (ref 11.5–15.5)
IMM GRANULOCYTES NFR BLD AUTO: 0.5 % — SIGNIFICANT CHANGE UP (ref 0–0.9)
LYMPHOCYTES # BLD AUTO: 1.91 K/UL — SIGNIFICANT CHANGE UP (ref 1–3.3)
LYMPHOCYTES # BLD AUTO: 30.6 % — SIGNIFICANT CHANGE UP (ref 13–44)
MCHC RBC-ENTMCNC: 30.6 PG — SIGNIFICANT CHANGE UP (ref 27–34)
MCHC RBC-ENTMCNC: 33 G/DL — SIGNIFICANT CHANGE UP (ref 32–36)
MCV RBC AUTO: 92.9 FL — SIGNIFICANT CHANGE UP (ref 80–100)
MONOCYTES # BLD AUTO: 1.49 K/UL — HIGH (ref 0–0.9)
MONOCYTES NFR BLD AUTO: 23.9 % — HIGH (ref 2–14)
NEUTROPHILS # BLD AUTO: 2.58 K/UL — SIGNIFICANT CHANGE UP (ref 1.8–7.4)
NEUTROPHILS NFR BLD AUTO: 41.3 % — LOW (ref 43–77)
NRBC # BLD: 0 /100 WBCS — SIGNIFICANT CHANGE UP (ref 0–0)
PLATELET # BLD AUTO: 287 K/UL — SIGNIFICANT CHANGE UP (ref 150–400)
POTASSIUM SERPL-MCNC: 5 MMOL/L — SIGNIFICANT CHANGE UP (ref 3.5–5.3)
POTASSIUM SERPL-SCNC: 5 MMOL/L — SIGNIFICANT CHANGE UP (ref 3.5–5.3)
RBC # BLD: 3.82 M/UL — SIGNIFICANT CHANGE UP (ref 3.8–5.2)
RBC # FLD: 16.6 % — HIGH (ref 10.3–14.5)
SODIUM SERPL-SCNC: 138 MMOL/L — SIGNIFICANT CHANGE UP (ref 135–145)
WBC # BLD: 6.24 K/UL — SIGNIFICANT CHANGE UP (ref 3.8–10.5)
WBC # FLD AUTO: 6.24 K/UL — SIGNIFICANT CHANGE UP (ref 3.8–10.5)

## 2022-10-19 ENCOUNTER — APPOINTMENT (OUTPATIENT)
Dept: INFUSION THERAPY | Facility: HOSPITAL | Age: 67
End: 2022-10-19

## 2022-10-20 ENCOUNTER — APPOINTMENT (OUTPATIENT)
Dept: INFUSION THERAPY | Facility: HOSPITAL | Age: 67
End: 2022-10-20

## 2022-11-01 ENCOUNTER — RESULT REVIEW (OUTPATIENT)
Age: 67
End: 2022-11-01

## 2022-11-01 ENCOUNTER — APPOINTMENT (OUTPATIENT)
Dept: HEMATOLOGY ONCOLOGY | Facility: CLINIC | Age: 67
End: 2022-11-01

## 2022-11-01 VITALS
BODY MASS INDEX: 26.17 KG/M2 | OXYGEN SATURATION: 94 % | DIASTOLIC BLOOD PRESSURE: 85 MMHG | HEART RATE: 100 BPM | WEIGHT: 147.71 LBS | SYSTOLIC BLOOD PRESSURE: 132 MMHG | TEMPERATURE: 97.2 F | RESPIRATION RATE: 18 BRPM

## 2022-11-01 LAB
BASOPHILS # BLD AUTO: 0.07 K/UL — SIGNIFICANT CHANGE UP (ref 0–0.2)
BASOPHILS NFR BLD AUTO: 1.1 % — SIGNIFICANT CHANGE UP (ref 0–2)
EOSINOPHIL # BLD AUTO: 0.06 K/UL — SIGNIFICANT CHANGE UP (ref 0–0.5)
EOSINOPHIL NFR BLD AUTO: 1 % — SIGNIFICANT CHANGE UP (ref 0–6)
HCT VFR BLD CALC: 31.4 % — LOW (ref 34.5–45)
HGB BLD-MCNC: 10.1 G/DL — LOW (ref 11.5–15.5)
IMM GRANULOCYTES NFR BLD AUTO: 0.3 % — SIGNIFICANT CHANGE UP (ref 0–0.9)
LYMPHOCYTES # BLD AUTO: 1.98 K/UL — SIGNIFICANT CHANGE UP (ref 1–3.3)
LYMPHOCYTES # BLD AUTO: 31.7 % — SIGNIFICANT CHANGE UP (ref 13–44)
MCHC RBC-ENTMCNC: 31 PG — SIGNIFICANT CHANGE UP (ref 27–34)
MCHC RBC-ENTMCNC: 32.2 G/DL — SIGNIFICANT CHANGE UP (ref 32–36)
MCV RBC AUTO: 96.3 FL — SIGNIFICANT CHANGE UP (ref 80–100)
MONOCYTES # BLD AUTO: 1.46 K/UL — HIGH (ref 0–0.9)
MONOCYTES NFR BLD AUTO: 23.4 % — HIGH (ref 2–14)
NEUTROPHILS # BLD AUTO: 2.66 K/UL — SIGNIFICANT CHANGE UP (ref 1.8–7.4)
NEUTROPHILS NFR BLD AUTO: 42.5 % — LOW (ref 43–77)
NRBC # BLD: 0 /100 WBCS — SIGNIFICANT CHANGE UP (ref 0–0)
PLATELET # BLD AUTO: 187 K/UL — SIGNIFICANT CHANGE UP (ref 150–400)
RBC # BLD: 3.26 M/UL — LOW (ref 3.8–5.2)
RBC # FLD: 16.4 % — HIGH (ref 10.3–14.5)
WBC # BLD: 6.25 K/UL — SIGNIFICANT CHANGE UP (ref 3.8–10.5)
WBC # FLD AUTO: 6.25 K/UL — SIGNIFICANT CHANGE UP (ref 3.8–10.5)

## 2022-11-01 PROCEDURE — 99213 OFFICE O/P EST LOW 20 MIN: CPT

## 2022-11-02 ENCOUNTER — NON-APPOINTMENT (OUTPATIENT)
Age: 67
End: 2022-11-02

## 2022-11-02 LAB
ALBUMIN SERPL ELPH-MCNC: 4.3 G/DL
ALP BLD-CCNC: 62 U/L
ALT SERPL-CCNC: 25 U/L
ANION GAP SERPL CALC-SCNC: 13 MMOL/L
AST SERPL-CCNC: 26 U/L
BILIRUB SERPL-MCNC: 0.2 MG/DL
BUN SERPL-MCNC: 27 MG/DL
CALCIUM SERPL-MCNC: 9.3 MG/DL
CHLORIDE SERPL-SCNC: 104 MMOL/L
CO2 SERPL-SCNC: 22 MMOL/L
CREAT SERPL-MCNC: 1.26 MG/DL
EGFR: 48 ML/MIN/1.73M2
GLUCOSE SERPL-MCNC: 96 MG/DL
POTASSIUM SERPL-SCNC: 5 MMOL/L
PROT SERPL-MCNC: 7.4 G/DL
SODIUM SERPL-SCNC: 139 MMOL/L

## 2022-11-04 NOTE — HISTORY OF PRESENT ILLNESS
[Disease: _____________________] : Disease: [unfilled] [T: ___] : T[unfilled] [N: ___] : N[unfilled] [M: ___] : M[unfilled] [AJCC Stage: ____] : AJCC Stage: [unfilled] [de-identified] : 66F with extensive smoking history, who quit in July 2022, noted weight loss of about 25 pounds over the past 7-8 months.  She does report a history of hyperthyroidism and attributed the weight loss to that at first.  In late May 2022, her family noted neck swelling.  She saw her PCP and was sent for a thyroid ultrasound which was unremarkable per the patient.  Due to the weight loss, she was sent for a CXR that was abnormal.  This led to a CT chest performed in early July 2022 revealing a large right hilar/mediastinal mass measuring up to 7 cm with bilateral pulmonary nodules.  She was referred to thoracic surgery.  She underwent EBUS/bronchoscopy in late July 2022 with biopsy of the RUL and pretracheal lymph node revealing small cell carcinoma.  Initial staging CT revealed an 11cm partially calcified left adnexal mass felt to represent an exophytic fibroid.  She was felt to have extensive stage disease based on the lung nodules felt to represent metastases.  Began first line Carbo/Etoposide/Atezolizumab in August 2022; achieved [place].  Completed  C4 with Carbo/Etoposide/Atezo 10/18-20. \par \par  [de-identified] : – Lung, RUL biopsy and pretracheal EBUS guided FNA 7/29/2022: Positive for malignant cells.  Small cell carcinoma. [de-identified] : Patient is s/p C4 of first-line Carboplatin/Etoposide/Atezolizumab 10/18-20. \par She continues to tolerate therapy quite well.  Experienced alopecia.  Denies F/C/N/V/D or constipation. Denies pain, dysgeusia. \par She continues methimazole for hyperthyroidism. \par \par She had an Brain MRI  10/31 and an open MRI facility with results pending.\par \par

## 2022-11-04 NOTE — ASSESSMENT
[FreeTextEntry1] : Extensive stage small cell lung cancer.  Began first line chemo + immunotherapy with Carboplatin/Etoposide/Atezolizumab in August 2022; achieved [place]. \par Now s/p C4 10/18-20.  Recommend:\par – Repeat labs today\par – Continue with Atezolizumab maintenance administered at monthly dosing schedule beginning with C5.   \par – Can discontinue trilaciclib now that chemo is completed\par -Labs today\par – MRI Brain results pending from 10/31\par –She appears to have a left adnexal fibroid mass unrelated to her cancer diagnosis\par – Can consider role of incorporating consolidative thoracic RT during the course of her treatment\par – F/U pre treatment going forward\par –Appointments scheduled appropriately

## 2022-11-04 NOTE — PHYSICAL EXAM
[Fully active, able to carry on all pre-disease performance without restriction] : Status 0 - Fully active, able to carry on all pre-disease performance without restriction [Normal] : affect appropriate [de-identified] : No icterus [de-identified] : MMM O/P Clear [de-identified] : supple No LAD [de-identified] : Clear [de-identified] : S1 S2 [de-identified] : No edema [de-identified] : No spine/CVA tenderness

## 2022-11-18 ENCOUNTER — APPOINTMENT (OUTPATIENT)
Dept: HEMATOLOGY ONCOLOGY | Facility: CLINIC | Age: 67
End: 2022-11-18

## 2022-11-18 ENCOUNTER — APPOINTMENT (OUTPATIENT)
Dept: INFUSION THERAPY | Facility: HOSPITAL | Age: 67
End: 2022-11-18

## 2022-11-22 ENCOUNTER — APPOINTMENT (OUTPATIENT)
Dept: HEMATOLOGY ONCOLOGY | Facility: CLINIC | Age: 67
End: 2022-11-22

## 2022-12-09 ENCOUNTER — RESULT REVIEW (OUTPATIENT)
Age: 67
End: 2022-12-09

## 2022-12-09 ENCOUNTER — APPOINTMENT (OUTPATIENT)
Dept: INFUSION THERAPY | Facility: HOSPITAL | Age: 67
End: 2022-12-09

## 2022-12-09 ENCOUNTER — APPOINTMENT (OUTPATIENT)
Dept: HEMATOLOGY ONCOLOGY | Facility: CLINIC | Age: 67
End: 2022-12-09
Payer: MEDICARE

## 2022-12-09 ENCOUNTER — NON-APPOINTMENT (OUTPATIENT)
Age: 67
End: 2022-12-09

## 2022-12-09 VITALS
SYSTOLIC BLOOD PRESSURE: 126 MMHG | HEART RATE: 98 BPM | TEMPERATURE: 97.3 F | RESPIRATION RATE: 18 BRPM | DIASTOLIC BLOOD PRESSURE: 76 MMHG | HEIGHT: 63.19 IN | BODY MASS INDEX: 25.96 KG/M2 | OXYGEN SATURATION: 97 % | WEIGHT: 148.37 LBS

## 2022-12-09 LAB
ALBUMIN SERPL ELPH-MCNC: 4.6 G/DL — SIGNIFICANT CHANGE UP (ref 3.3–5)
ALP SERPL-CCNC: 84 U/L — SIGNIFICANT CHANGE UP (ref 40–120)
ALT FLD-CCNC: 55 U/L — HIGH (ref 10–45)
ANION GAP SERPL CALC-SCNC: 15 MMOL/L — SIGNIFICANT CHANGE UP (ref 5–17)
AST SERPL-CCNC: 42 U/L — HIGH (ref 10–40)
BASOPHILS # BLD AUTO: 0.13 K/UL — SIGNIFICANT CHANGE UP (ref 0–0.2)
BASOPHILS NFR BLD AUTO: 1 % — SIGNIFICANT CHANGE UP (ref 0–2)
BILIRUB SERPL-MCNC: 0.4 MG/DL — SIGNIFICANT CHANGE UP (ref 0.2–1.2)
BUN SERPL-MCNC: 28 MG/DL — HIGH (ref 7–23)
CALCIUM SERPL-MCNC: 9.6 MG/DL — SIGNIFICANT CHANGE UP (ref 8.4–10.5)
CHLORIDE SERPL-SCNC: 98 MMOL/L — SIGNIFICANT CHANGE UP (ref 96–108)
CO2 SERPL-SCNC: 22 MMOL/L — SIGNIFICANT CHANGE UP (ref 22–31)
CREAT SERPL-MCNC: 1.23 MG/DL — SIGNIFICANT CHANGE UP (ref 0.5–1.3)
EGFR: 49 ML/MIN/1.73M2 — LOW
EOSINOPHIL # BLD AUTO: 0.4 K/UL — SIGNIFICANT CHANGE UP (ref 0–0.5)
EOSINOPHIL NFR BLD AUTO: 3 % — SIGNIFICANT CHANGE UP (ref 0–6)
GLUCOSE SERPL-MCNC: 106 MG/DL — HIGH (ref 70–99)
HCT VFR BLD CALC: 36.8 % — SIGNIFICANT CHANGE UP (ref 34.5–45)
HGB BLD-MCNC: 11.9 G/DL — SIGNIFICANT CHANGE UP (ref 11.5–15.5)
LYMPHOCYTES # BLD AUTO: 27 % — SIGNIFICANT CHANGE UP (ref 13–44)
LYMPHOCYTES # BLD AUTO: 3.62 K/UL — HIGH (ref 1–3.3)
MCHC RBC-ENTMCNC: 31.2 PG — SIGNIFICANT CHANGE UP (ref 27–34)
MCHC RBC-ENTMCNC: 32.3 G/DL — SIGNIFICANT CHANGE UP (ref 32–36)
MCV RBC AUTO: 96.3 FL — SIGNIFICANT CHANGE UP (ref 80–100)
MONOCYTES # BLD AUTO: 0.8 K/UL — SIGNIFICANT CHANGE UP (ref 0–0.9)
MONOCYTES NFR BLD AUTO: 6 % — SIGNIFICANT CHANGE UP (ref 2–14)
NEUTROPHILS # BLD AUTO: 8.45 K/UL — HIGH (ref 1.8–7.4)
NEUTROPHILS NFR BLD AUTO: 63 % — SIGNIFICANT CHANGE UP (ref 43–77)
NRBC # BLD: 0 /100 — SIGNIFICANT CHANGE UP (ref 0–0)
NRBC # BLD: SIGNIFICANT CHANGE UP /100 WBCS (ref 0–0)
PLAT MORPH BLD: NORMAL — SIGNIFICANT CHANGE UP
PLATELET # BLD AUTO: 285 K/UL — SIGNIFICANT CHANGE UP (ref 150–400)
POTASSIUM SERPL-MCNC: 4.8 MMOL/L — SIGNIFICANT CHANGE UP (ref 3.5–5.3)
POTASSIUM SERPL-SCNC: 4.8 MMOL/L — SIGNIFICANT CHANGE UP (ref 3.5–5.3)
PROT SERPL-MCNC: 8.1 G/DL — SIGNIFICANT CHANGE UP (ref 6–8.3)
RBC # BLD: 3.82 M/UL — SIGNIFICANT CHANGE UP (ref 3.8–5.2)
RBC # FLD: 14.4 % — SIGNIFICANT CHANGE UP (ref 10.3–14.5)
RBC BLD AUTO: SIGNIFICANT CHANGE UP
SODIUM SERPL-SCNC: 135 MMOL/L — SIGNIFICANT CHANGE UP (ref 135–145)
WBC # BLD: 13.41 K/UL — HIGH (ref 3.8–10.5)
WBC # FLD AUTO: 13.41 K/UL — HIGH (ref 3.8–10.5)

## 2022-12-09 PROCEDURE — 99214 OFFICE O/P EST MOD 30 MIN: CPT

## 2022-12-10 LAB
T4 FREE SERPL-MCNC: 0.9 NG/DL — SIGNIFICANT CHANGE UP (ref 0.9–1.8)
TSH SERPL-MCNC: 1.36 UIU/ML — SIGNIFICANT CHANGE UP (ref 0.27–4.2)

## 2022-12-20 ENCOUNTER — APPOINTMENT (OUTPATIENT)
Dept: FAMILY MEDICINE | Facility: CLINIC | Age: 67
End: 2022-12-20

## 2022-12-29 ENCOUNTER — OUTPATIENT (OUTPATIENT)
Dept: OUTPATIENT SERVICES | Facility: HOSPITAL | Age: 67
LOS: 1 days | Discharge: HOME | End: 2022-12-29

## 2022-12-29 ENCOUNTER — RESULT REVIEW (OUTPATIENT)
Age: 67
End: 2022-12-29

## 2022-12-29 DIAGNOSIS — Z90.49 ACQUIRED ABSENCE OF OTHER SPECIFIED PARTS OF DIGESTIVE TRACT: Chronic | ICD-10-CM

## 2022-12-29 DIAGNOSIS — C34.90 MALIGNANT NEOPLASM OF UNSPECIFIED PART OF UNSPECIFIED BRONCHUS OR LUNG: ICD-10-CM

## 2023-01-02 ENCOUNTER — APPOINTMENT (OUTPATIENT)
Dept: CT IMAGING | Facility: IMAGING CENTER | Age: 68
End: 2023-01-02
Payer: MEDICARE

## 2023-01-02 ENCOUNTER — RESULT REVIEW (OUTPATIENT)
Age: 68
End: 2023-01-02

## 2023-01-02 ENCOUNTER — OUTPATIENT (OUTPATIENT)
Dept: OUTPATIENT SERVICES | Facility: HOSPITAL | Age: 68
LOS: 1 days | End: 2023-01-02
Payer: MEDICARE

## 2023-01-02 DIAGNOSIS — C34.01 MALIGNANT NEOPLASM OF RIGHT MAIN BRONCHUS: ICD-10-CM

## 2023-01-02 DIAGNOSIS — Z90.49 ACQUIRED ABSENCE OF OTHER SPECIFIED PARTS OF DIGESTIVE TRACT: Chronic | ICD-10-CM

## 2023-01-02 PROCEDURE — 74177 CT ABD & PELVIS W/CONTRAST: CPT | Mod: 26,MH

## 2023-01-02 PROCEDURE — 71260 CT THORAX DX C+: CPT | Mod: 26,MH

## 2023-01-02 PROCEDURE — 71260 CT THORAX DX C+: CPT

## 2023-01-02 PROCEDURE — 74177 CT ABD & PELVIS W/CONTRAST: CPT

## 2023-01-03 NOTE — PHYSICAL EXAM
[Fully active, able to carry on all pre-disease performance without restriction] : Status 0 - Fully active, able to carry on all pre-disease performance without restriction [Normal] : affect appropriate [de-identified] : No icterus [de-identified] : MMM O/P Clear [de-identified] : supple No LAD [de-identified] : Clear [de-identified] : S1 S2 [de-identified] : No edema [de-identified] : No spine/CVA tenderness

## 2023-01-03 NOTE — ASSESSMENT
[FreeTextEntry1] : Extensive stage small cell lung cancer.  Began first line chemo + immunotherapy with Carboplatin/Etoposide/Atezolizumab in August 2022; achieved [place]. \par Now s/p C4 10/18-20.  Recommend:\par – Repeat labs today\par – Continue with Atezolizumab maintenance   \par – Can discontinue trilaciclib now that chemo is completed\par -[facility] contacted for new PCP and endocrinologist; encouraged patient to schedule. \par -Labs today\par – MRI Brain results pending from 10/31\par –She appears to have a left adnexal fibroid mass unrelated to her cancer diagnosis\par – Can consider role of incorporating consolidative thoracic RT during the course of her treatment\par – F/U pre treatment going forward\par -Restaging imaging in early Jan with f/u for review or sooner if needed. \par –Appointments scheduled appropriately

## 2023-01-03 NOTE — HISTORY OF PRESENT ILLNESS
[Disease: _____________________] : Disease: [unfilled] [T: ___] : T[unfilled] [N: ___] : N[unfilled] [M: ___] : M[unfilled] [AJCC Stage: ____] : AJCC Stage: [unfilled] [de-identified] : 66F with extensive smoking history, who quit in July 2022, noted weight loss of about 25 pounds over the past 7-8 months.  She does report a history of hyperthyroidism and attributed the weight loss to that at first.  In late May 2022, her family noted neck swelling.  She saw her PCP and was sent for a thyroid ultrasound which was unremarkable per the patient.  Due to the weight loss, she was sent for a CXR that was abnormal.  This led to a CT chest performed in early July 2022 revealing a large right hilar/mediastinal mass measuring up to 7 cm with bilateral pulmonary nodules.  She was referred to thoracic surgery.  She underwent EBUS/bronchoscopy in late July 2022 with biopsy of the RUL and pretracheal lymph node revealing small cell carcinoma.  Initial staging CT revealed an 11cm partially calcified left adnexal mass felt to represent an exophytic fibroid.  She was felt to have extensive stage disease based on the lung nodules felt to represent metastases.  Began first line Carbo/Etoposide/Atezolizumab in August 2022; achieved [place].  Completed  C4 with Carbo/Etoposide/Atezo 10/18-20. \par \par  [de-identified] : – Lung, RUL biopsy and pretracheal EBUS guided FNA 7/29/2022: Positive for malignant cells.  Small cell carcinoma. [de-identified] : Patient is s/p C4 of first-line Carboplatin/Etoposide/Atezolizumab 10/18-20. \par She is seen today prior to maintenance Atezolizumab. \par She continues to tolerate therapy quite well.  Experienced alopecia.  SOB very occasional dry cough\par Denies F/C/N/V/D or constipation. Denies pain, dysgeusia. \par She continues methimazole for hyperthyroidism. Has not seen endocrine for hypercalcemia; [facility] has called her several times to arrange and discussed the importance of this appointment.  She also requested a referral for a new PCP; [facility] was also utilized to arrange this appointment. \par \par

## 2023-01-06 ENCOUNTER — RESULT REVIEW (OUTPATIENT)
Age: 68
End: 2023-01-06

## 2023-01-06 ENCOUNTER — APPOINTMENT (OUTPATIENT)
Dept: HEMATOLOGY ONCOLOGY | Facility: CLINIC | Age: 68
End: 2023-01-06
Payer: MEDICARE

## 2023-01-06 ENCOUNTER — APPOINTMENT (OUTPATIENT)
Dept: INFUSION THERAPY | Facility: HOSPITAL | Age: 68
End: 2023-01-06

## 2023-01-06 ENCOUNTER — NON-APPOINTMENT (OUTPATIENT)
Age: 68
End: 2023-01-06

## 2023-01-06 VITALS
SYSTOLIC BLOOD PRESSURE: 132 MMHG | OXYGEN SATURATION: 90 % | HEIGHT: 63.19 IN | HEART RATE: 121 BPM | RESPIRATION RATE: 18 BRPM | WEIGHT: 145.06 LBS | DIASTOLIC BLOOD PRESSURE: 77 MMHG | BODY MASS INDEX: 25.38 KG/M2 | TEMPERATURE: 97.3 F

## 2023-01-06 DIAGNOSIS — Z51.11 ENCOUNTER FOR ANTINEOPLASTIC CHEMOTHERAPY: ICD-10-CM

## 2023-01-06 DIAGNOSIS — Z51.12 ENCOUNTER FOR ANTINEOPLASTIC CHEMOTHERAPY: ICD-10-CM

## 2023-01-06 LAB
ALBUMIN SERPL ELPH-MCNC: 3.9 G/DL — SIGNIFICANT CHANGE UP (ref 3.3–5)
ALP SERPL-CCNC: 63 U/L — SIGNIFICANT CHANGE UP (ref 40–120)
ALT FLD-CCNC: 10 U/L — SIGNIFICANT CHANGE UP (ref 10–45)
ANION GAP SERPL CALC-SCNC: 14 MMOL/L — SIGNIFICANT CHANGE UP (ref 5–17)
AST SERPL-CCNC: 21 U/L — SIGNIFICANT CHANGE UP (ref 10–40)
BASOPHILS # BLD AUTO: 0.06 K/UL — SIGNIFICANT CHANGE UP (ref 0–0.2)
BASOPHILS NFR BLD AUTO: 0.7 % — SIGNIFICANT CHANGE UP (ref 0–2)
BILIRUB SERPL-MCNC: 0.3 MG/DL — SIGNIFICANT CHANGE UP (ref 0.2–1.2)
BUN SERPL-MCNC: 24 MG/DL — HIGH (ref 7–23)
CALCIUM SERPL-MCNC: 9 MG/DL — SIGNIFICANT CHANGE UP (ref 8.4–10.5)
CHLORIDE SERPL-SCNC: 99 MMOL/L — SIGNIFICANT CHANGE UP (ref 96–108)
CO2 SERPL-SCNC: 23 MMOL/L — SIGNIFICANT CHANGE UP (ref 22–31)
CREAT SERPL-MCNC: 1.47 MG/DL — HIGH (ref 0.5–1.3)
EGFR: 39 ML/MIN/1.73M2 — LOW
EOSINOPHIL # BLD AUTO: 0.15 K/UL — SIGNIFICANT CHANGE UP (ref 0–0.5)
EOSINOPHIL NFR BLD AUTO: 1.8 % — SIGNIFICANT CHANGE UP (ref 0–6)
GLUCOSE SERPL-MCNC: 123 MG/DL — HIGH (ref 70–99)
HCT VFR BLD CALC: 36.4 % — SIGNIFICANT CHANGE UP (ref 34.5–45)
HGB BLD-MCNC: 11.8 G/DL — SIGNIFICANT CHANGE UP (ref 11.5–15.5)
IMM GRANULOCYTES NFR BLD AUTO: 0.2 % — SIGNIFICANT CHANGE UP (ref 0–0.9)
LYMPHOCYTES # BLD AUTO: 2.45 K/UL — SIGNIFICANT CHANGE UP (ref 1–3.3)
LYMPHOCYTES # BLD AUTO: 29.3 % — SIGNIFICANT CHANGE UP (ref 13–44)
MCHC RBC-ENTMCNC: 30.6 PG — SIGNIFICANT CHANGE UP (ref 27–34)
MCHC RBC-ENTMCNC: 32.4 G/DL — SIGNIFICANT CHANGE UP (ref 32–36)
MCV RBC AUTO: 94.5 FL — SIGNIFICANT CHANGE UP (ref 80–100)
MONOCYTES # BLD AUTO: 1.18 K/UL — HIGH (ref 0–0.9)
MONOCYTES NFR BLD AUTO: 14.1 % — HIGH (ref 2–14)
NEUTROPHILS # BLD AUTO: 4.51 K/UL — SIGNIFICANT CHANGE UP (ref 1.8–7.4)
NEUTROPHILS NFR BLD AUTO: 53.9 % — SIGNIFICANT CHANGE UP (ref 43–77)
NRBC # BLD: 0 /100 WBCS — SIGNIFICANT CHANGE UP (ref 0–0)
PLATELET # BLD AUTO: 324 K/UL — SIGNIFICANT CHANGE UP (ref 150–400)
POTASSIUM SERPL-MCNC: 5.1 MMOL/L — SIGNIFICANT CHANGE UP (ref 3.5–5.3)
POTASSIUM SERPL-SCNC: 5.1 MMOL/L — SIGNIFICANT CHANGE UP (ref 3.5–5.3)
PROT SERPL-MCNC: 7.6 G/DL — SIGNIFICANT CHANGE UP (ref 6–8.3)
RBC # BLD: 3.85 M/UL — SIGNIFICANT CHANGE UP (ref 3.8–5.2)
RBC # FLD: 12.7 % — SIGNIFICANT CHANGE UP (ref 10.3–14.5)
SODIUM SERPL-SCNC: 136 MMOL/L — SIGNIFICANT CHANGE UP (ref 135–145)
WBC # BLD: 8.37 K/UL — SIGNIFICANT CHANGE UP (ref 3.8–10.5)
WBC # FLD AUTO: 8.37 K/UL — SIGNIFICANT CHANGE UP (ref 3.8–10.5)

## 2023-01-06 PROCEDURE — 99214 OFFICE O/P EST MOD 30 MIN: CPT

## 2023-01-06 RX ORDER — BENZONATATE 100 MG/1
100 CAPSULE ORAL
Qty: 90 | Refills: 5 | Status: ACTIVE | COMMUNITY
Start: 2022-08-12 | End: 1900-01-01

## 2023-01-07 DIAGNOSIS — Z51.11 ENCOUNTER FOR ANTINEOPLASTIC CHEMOTHERAPY: ICD-10-CM

## 2023-01-18 NOTE — HISTORY OF PRESENT ILLNESS
[Disease: _____________________] : Disease: [unfilled] [T: ___] : T[unfilled] [N: ___] : N[unfilled] [M: ___] : M[unfilled] [AJCC Stage: ____] : AJCC Stage: [unfilled] [de-identified] : 66F with extensive smoking history, who quit in July 2022, noted weight loss of about 25 pounds over the past 7-8 months.  She does report a history of hyperthyroidism and attributed the weight loss to that at first.  In late May 2022, her family noted neck swelling.  She saw her PCP and was sent for a thyroid ultrasound which was unremarkable per the patient.  Due to the weight loss, she was sent for a CXR that was abnormal.  This led to a CT chest performed in early July 2022 revealing a large right hilar/mediastinal mass measuring up to 7 cm with bilateral pulmonary nodules.  She was referred to thoracic surgery.  She underwent EBUS/bronchoscopy in late July 2022 with biopsy of the RUL and pretracheal lymph node revealing small cell carcinoma.  Initial staging CT revealed an 11cm partially calcified left adnexal mass felt to represent an exophytic fibroid.  She was felt to have extensive stage disease based on the lung nodules felt to represent metastases.  Began first line Carbo/Etoposide/Atezolizumab in August 2022; achieved [place].  Completed  4 cycles of chemo + immunotherapy in Oct 2022 followed by maintenance Atezolizumab as of Nov 2022. \par \par  [de-identified] : – Lung, RUL biopsy and pretracheal EBUS guided FNA 7/29/2022: Positive for malignant cells.  Small cell carcinoma. [de-identified] : Completed  4 cycles of chemo + immunotherapy in Oct 2022 followed by maintenance Atezolizumab as of Nov 2022.  She is seen today prior to maintenance Atezolizumab. \par She reports chronic sinus issues.  She reports recovering from a recent URI and has an occasional cough.\par She never picked up a prior benzonatate prescription that was sent to her in the past and she is not aware of [facility] pharmacy.\par Tolerating the single agent immunotherapy well.  Denies F/C/N/V/D.\par She has not yet seen endocrine for hypercalcemia and hyperthyroidism.  \par She has not established care with a new PCP.\par \par Restaging CT this month with further decrease of the RUL nodules; there is mention of new ill-defined RLL nodule and nodules along the left oblique fissure that can be monitored on subsequent scan.

## 2023-01-18 NOTE — ASSESSMENT
[FreeTextEntry1] : Extensive stage small cell lung cancer.  Began first line chemo + immunotherapy with Carboplatin/Etoposide/Atezolizumab in August 2022; achieved [place].  Completed  4 cycles of chemo + immunotherapy in Oct 2022 followed by maintenance Atezolizumab as of Nov 2022.\par Restaging CT Jan 2023 with overall sustained response; there are non-specific nodules that can be monitored on subsequent scan.  Recommend:\par – Continue with Atezolizumab maintenance for as long as it benefits the patient\par – Repeat labs today\par -Trial benzonatate for cough s/p URI\par – Patient to establish care with Endocrine and PCP\par – MRI Brain from 10/31/22 was negative.  There may be a pituitary microadenoma:  this can be addressed with Endocrine\par –She appears to have a left adnexal fibroid mass unrelated to her cancer diagnosis\par – Can consider role of incorporating consolidative thoracic RT during the course of her treatment\par – F/U prior to next cycle or sooner should problems arise.    Plan on obtaining her next restaging scan in April if she remains clinically stable

## 2023-01-18 NOTE — RESULTS/DATA
[FreeTextEntry1] : – CT chest 7/7/2022: Large mediastinal mass measuring up to 7.0 cm extending into the right hilum and RUL parenchyma.  Differential considerations include a primary lung malignancy with mediastinal invasion versus primary mediastinal mass with extension into the lung parenchyma.  Mass narrows and encases the lobar right superior pulmonary artery and segmental branches and SVC.  There is abutment of the aortic arch branches by the mass.  Multiple bilateral pulmonary nodules measuring up to 1.4 cm.  \par -CT A/P 8/16: no evidence of metastatic disease in the abdomen or pelvis\par \par -CT C/A/P 9/26/22:  Comparison is made to outside CT chest July 7, 2022:  \par Favorable response to treatment as evidenced by significant interval decrease in size of right upper lobe pulmonary nodules, and interval decrease in size of multiple mediastinal lymph nodes. Stable 11.3 cm partially-calcified left adnexal mass. Pelvic MRI can be considered to exclude left adnexal pathology if clinically warranted.\par \par – MRI brain 10/31/2022: Negative for metastatic disease.  Probable pituitary microadenoma on the right.\par \par Images Reviewed/Interpreted:\par \par -CT C/A/P 1/2/23:  Since 9/26/2022:  Further decrease in size of right upper lobe nodules, representing known lung cancer.  New ill-defined right lower lobe nodule and slightly larger nodules abutting the left oblique fissure, of uncertain significance. 3 month follow-up is recommended.  Unchanged left adnexal mass.\par \par

## 2023-01-18 NOTE — PHYSICAL EXAM
[Fully active, able to carry on all pre-disease performance without restriction] : Status 0 - Fully active, able to carry on all pre-disease performance without restriction [Normal] : affect appropriate [de-identified] : No icterus [de-identified] : MMM O/P Clear [de-identified] : supple No LAD [de-identified] : Clear [de-identified] : S1 S2 [de-identified] : No edema [de-identified] : No spine/CVA tenderness

## 2023-01-24 ENCOUNTER — APPOINTMENT (OUTPATIENT)
Dept: HEMATOLOGY ONCOLOGY | Facility: CLINIC | Age: 68
End: 2023-01-24

## 2023-01-24 ENCOUNTER — APPOINTMENT (OUTPATIENT)
Dept: INFUSION THERAPY | Facility: HOSPITAL | Age: 68
End: 2023-01-24

## 2023-02-03 ENCOUNTER — APPOINTMENT (OUTPATIENT)
Dept: HEMATOLOGY ONCOLOGY | Facility: CLINIC | Age: 68
End: 2023-02-03
Payer: MEDICARE

## 2023-02-03 ENCOUNTER — APPOINTMENT (OUTPATIENT)
Dept: INFUSION THERAPY | Facility: HOSPITAL | Age: 68
End: 2023-02-03

## 2023-02-03 ENCOUNTER — RESULT REVIEW (OUTPATIENT)
Age: 68
End: 2023-02-03

## 2023-02-03 ENCOUNTER — NON-APPOINTMENT (OUTPATIENT)
Age: 68
End: 2023-02-03

## 2023-02-03 VITALS
OXYGEN SATURATION: 94 % | BODY MASS INDEX: 25 KG/M2 | SYSTOLIC BLOOD PRESSURE: 142 MMHG | WEIGHT: 142.84 LBS | TEMPERATURE: 97.6 F | RESPIRATION RATE: 16 BRPM | HEIGHT: 63.19 IN | HEART RATE: 96 BPM | DIASTOLIC BLOOD PRESSURE: 87 MMHG

## 2023-02-03 LAB
BASOPHILS # BLD AUTO: 0.1 K/UL — SIGNIFICANT CHANGE UP (ref 0–0.2)
BASOPHILS NFR BLD AUTO: 1.4 % — SIGNIFICANT CHANGE UP (ref 0–2)
EOSINOPHIL # BLD AUTO: 0.31 K/UL — SIGNIFICANT CHANGE UP (ref 0–0.5)
EOSINOPHIL NFR BLD AUTO: 4.4 % — SIGNIFICANT CHANGE UP (ref 0–6)
HCT VFR BLD CALC: 32.9 % — LOW (ref 34.5–45)
HGB BLD-MCNC: 10.6 G/DL — LOW (ref 11.5–15.5)
IMM GRANULOCYTES NFR BLD AUTO: 0.1 % — SIGNIFICANT CHANGE UP (ref 0–0.9)
LYMPHOCYTES # BLD AUTO: 2.66 K/UL — SIGNIFICANT CHANGE UP (ref 1–3.3)
LYMPHOCYTES # BLD AUTO: 37.6 % — SIGNIFICANT CHANGE UP (ref 13–44)
MCHC RBC-ENTMCNC: 29.3 PG — SIGNIFICANT CHANGE UP (ref 27–34)
MCHC RBC-ENTMCNC: 32.2 G/DL — SIGNIFICANT CHANGE UP (ref 32–36)
MCV RBC AUTO: 90.9 FL — SIGNIFICANT CHANGE UP (ref 80–100)
MONOCYTES # BLD AUTO: 0.99 K/UL — HIGH (ref 0–0.9)
MONOCYTES NFR BLD AUTO: 14 % — SIGNIFICANT CHANGE UP (ref 2–14)
NEUTROPHILS # BLD AUTO: 3 K/UL — SIGNIFICANT CHANGE UP (ref 1.8–7.4)
NEUTROPHILS NFR BLD AUTO: 42.5 % — LOW (ref 43–77)
NRBC # BLD: 0 /100 WBCS — SIGNIFICANT CHANGE UP (ref 0–0)
PLATELET # BLD AUTO: 287 K/UL — SIGNIFICANT CHANGE UP (ref 150–400)
RBC # BLD: 3.62 M/UL — LOW (ref 3.8–5.2)
RBC # FLD: 13 % — SIGNIFICANT CHANGE UP (ref 10.3–14.5)
WBC # BLD: 7.07 K/UL — SIGNIFICANT CHANGE UP (ref 3.8–10.5)
WBC # FLD AUTO: 7.07 K/UL — SIGNIFICANT CHANGE UP (ref 3.8–10.5)

## 2023-02-03 PROCEDURE — 99214 OFFICE O/P EST MOD 30 MIN: CPT

## 2023-02-04 LAB
ALBUMIN SERPL ELPH-MCNC: 4.1 G/DL — SIGNIFICANT CHANGE UP (ref 3.3–5)
ALP SERPL-CCNC: 62 U/L — SIGNIFICANT CHANGE UP (ref 40–120)
ALT FLD-CCNC: 13 U/L — SIGNIFICANT CHANGE UP (ref 10–45)
ANION GAP SERPL CALC-SCNC: 12 MMOL/L — SIGNIFICANT CHANGE UP (ref 5–17)
AST SERPL-CCNC: 19 U/L — SIGNIFICANT CHANGE UP (ref 10–40)
BILIRUB SERPL-MCNC: 0.2 MG/DL — SIGNIFICANT CHANGE UP (ref 0.2–1.2)
BUN SERPL-MCNC: 23 MG/DL — SIGNIFICANT CHANGE UP (ref 7–23)
CALCIUM SERPL-MCNC: 9.4 MG/DL — SIGNIFICANT CHANGE UP (ref 8.4–10.5)
CHLORIDE SERPL-SCNC: 101 MMOL/L — SIGNIFICANT CHANGE UP (ref 96–108)
CO2 SERPL-SCNC: 24 MMOL/L — SIGNIFICANT CHANGE UP (ref 22–31)
CREAT SERPL-MCNC: 1.38 MG/DL — HIGH (ref 0.5–1.3)
EGFR: 42 ML/MIN/1.73M2 — LOW
GLUCOSE SERPL-MCNC: 100 MG/DL — HIGH (ref 70–99)
POTASSIUM SERPL-MCNC: 5.1 MMOL/L — SIGNIFICANT CHANGE UP (ref 3.5–5.3)
POTASSIUM SERPL-SCNC: 5.1 MMOL/L — SIGNIFICANT CHANGE UP (ref 3.5–5.3)
PROT SERPL-MCNC: 7.4 G/DL — SIGNIFICANT CHANGE UP (ref 6–8.3)
SODIUM SERPL-SCNC: 137 MMOL/L — SIGNIFICANT CHANGE UP (ref 135–145)
T4 FREE SERPL-MCNC: 1 NG/DL — SIGNIFICANT CHANGE UP (ref 0.9–1.8)
TSH SERPL-MCNC: 1.03 UIU/ML — SIGNIFICANT CHANGE UP (ref 0.27–4.2)

## 2023-02-08 NOTE — PHYSICAL EXAM
[Fully active, able to carry on all pre-disease performance without restriction] : Status 0 - Fully active, able to carry on all pre-disease performance without restriction [Normal] : affect appropriate [de-identified] : No icterus [de-identified] : MMM O/P Clear [de-identified] : supple No LAD [de-identified] : Clear [de-identified] : S1 S2 [de-identified] : No edema [de-identified] : No spine/CVA tenderness

## 2023-02-08 NOTE — ASSESSMENT
[FreeTextEntry1] : Extensive stage small cell lung cancer.  Began first line chemo + immunotherapy with Carboplatin/Etoposide/Atezolizumab in August 2022; achieved [place].  Completed  4 cycles of chemo + immunotherapy in Oct 2022 followed by maintenance Atezolizumab as of Nov 2022.\par Restaging CT Jan 2023 with overall sustained response; there are non-specific nodules that can be monitored on subsequent scan.  Recommend:\par –Continue with Atezolizumab maintenance for as long as it benefits the patient\par –Repeat labs today\par -Cough improved without benzonatate\par –Endocrine consult on 2/10 for management of hypercalcemia and hyperthyroidism\par -New PCP appointment on 2/13\par –MRI Brain from 10/31/22 was negative.  There may be a pituitary microadenoma:  this can be addressed with Endocrine\par –She appears to have a left adnexal fibroid mass unrelated to her cancer diagnosis\par –Can consider role of incorporating consolidative thoracic RT during the course of her treatment\par –F/U prior to next cycle or sooner should problems arise.    \par -Plan on obtaining her next restaging scan in April if she remains clinically stable

## 2023-02-08 NOTE — HISTORY OF PRESENT ILLNESS
[Disease: _____________________] : Disease: [unfilled] [T: ___] : T[unfilled] [N: ___] : N[unfilled] [M: ___] : M[unfilled] [AJCC Stage: ____] : AJCC Stage: [unfilled] [de-identified] : 66F with extensive smoking history, who quit in July 2022, noted weight loss of about 25 pounds over the past 7-8 months.  She does report a history of hyperthyroidism and attributed the weight loss to that at first.  In late May 2022, her family noted neck swelling.  She saw her PCP and was sent for a thyroid ultrasound which was unremarkable per the patient.  Due to the weight loss, she was sent for a CXR that was abnormal.  This led to a CT chest performed in early July 2022 revealing a large right hilar/mediastinal mass measuring up to 7 cm with bilateral pulmonary nodules.  She was referred to thoracic surgery.  She underwent EBUS/bronchoscopy in late July 2022 with biopsy of the RUL and pretracheal lymph node revealing small cell carcinoma.  Initial staging CT revealed an 11cm partially calcified left adnexal mass felt to represent an exophytic fibroid.  She was felt to have extensive stage disease based on the lung nodules felt to represent metastases.  Began first line Carbo/Etoposide/Atezolizumab in August 2022; achieved [place].  Completed  4 cycles of chemo + immunotherapy in Oct 2022 followed by maintenance Atezolizumab as of Nov 2022. \par \par  [de-identified] : – Lung, RUL biopsy and pretracheal EBUS guided FNA 7/29/2022: Positive for malignant cells.  Small cell carcinoma. [de-identified] : Completed  4 cycles of chemo + immunotherapy in Oct 2022 followed by maintenance Atezolizumab as of Nov 2022.  She is seen today prior to maintenance Atezolizumab. \par \par Feeling well with no new areas of concern. She reports being pleased that her hair is growing back. \par Tolerating the single agent immunotherapy well.  Denies F/C/N/V/D.\par She is scheduled with endocrine on 2/10 for management of hypercalcemia and hyperthyroidism.  \par Has and appointment with a new PCP on 2/13.\par

## 2023-02-08 NOTE — RESULTS/DATA
[FreeTextEntry1] : – CT chest 7/7/2022: Large mediastinal mass measuring up to 7.0 cm extending into the right hilum and RUL parenchyma.  Differential considerations include a primary lung malignancy with mediastinal invasion versus primary mediastinal mass with extension into the lung parenchyma.  Mass narrows and encases the lobar right superior pulmonary artery and segmental branches and SVC.  There is abutment of the aortic arch branches by the mass.  Multiple bilateral pulmonary nodules measuring up to 1.4 cm.  \par -CT A/P 8/16: no evidence of metastatic disease in the abdomen or pelvis\par \par -CT C/A/P 9/26/22:  Comparison is made to outside CT chest July 7, 2022:  \par Favorable response to treatment as evidenced by significant interval decrease in size of right upper lobe pulmonary nodules, and interval decrease in size of multiple mediastinal lymph nodes. Stable 11.3 cm partially-calcified left adnexal mass. Pelvic MRI can be considered to exclude left adnexal pathology if clinically warranted.\par \par – MRI brain 10/31/2022: Negative for metastatic disease.  Probable pituitary microadenoma on the right.\par \par -CT C/A/P 1/2/23:  Since 9/26/2022:  Further decrease in size of right upper lobe nodules, representing known lung cancer.  New ill-defined right lower lobe nodule and slightly larger nodules abutting the left oblique fissure, of uncertain significance. 3 month follow-up is recommended.  Unchanged left adnexal mass.\par \par

## 2023-02-10 ENCOUNTER — APPOINTMENT (OUTPATIENT)
Dept: ENDOCRINOLOGY | Facility: CLINIC | Age: 68
End: 2023-02-10
Payer: MEDICARE

## 2023-02-10 VITALS
HEIGHT: 63 IN | TEMPERATURE: 99.1 F | OXYGEN SATURATION: 95 % | HEART RATE: 90 BPM | WEIGHT: 144 LBS | DIASTOLIC BLOOD PRESSURE: 70 MMHG | SYSTOLIC BLOOD PRESSURE: 110 MMHG | BODY MASS INDEX: 25.52 KG/M2

## 2023-02-10 LAB — GLUCOSE BLDC GLUCOMTR-MCNC: 128

## 2023-02-10 PROCEDURE — 99204 OFFICE O/P NEW MOD 45 MIN: CPT

## 2023-02-10 PROCEDURE — 82962 GLUCOSE BLOOD TEST: CPT

## 2023-02-10 RX ORDER — METHIMAZOLE 10 MG/1
10 TABLET ORAL
Refills: 0 | Status: DISCONTINUED | COMMUNITY
End: 2023-02-10

## 2023-02-10 NOTE — HISTORY OF PRESENT ILLNESS
[FreeTextEntry1] : 67 year F, referred for management of hyperthyroidism \par \par Prior or current medication thyroid use: Yes, MMI 5mg po daily, reports has been on this dose for 4 years  \par Known family or personal hx of thyroid disease:Yes \par Goiter or hx of goiter: No\par Known Hx of autoimmune disease: No\par History of Radioactive iodine therapy/ Chest or Neck radiation therapy: No\par \par \par Fatigue: No\par Weight loss without significant change in appetite: Yes  \par Heat intolerance: No\par Irritability, anxiety or agitation: No\par Weakness, tremor: No\par Palpitations: No\par Exertional dyspnea: No\par Anterior neck pain: No\par Insomnia: No\par \par Dyspnea: No\par Dysphagia: No\par

## 2023-02-10 NOTE — PHYSICAL EXAM
[Alert] : alert [Well Nourished] : well nourished [No Acute Distress] : no acute distress [Well Developed] : well developed [Normal Sclera/Conjunctiva] : normal sclera/conjunctiva [EOMI] : extra ocular movement intact [No Proptosis] : no proptosis [Normal Oropharynx] : the oropharynx was normal [No Respiratory Distress] : no respiratory distress [No Accessory Muscle Use] : no accessory muscle use [Clear to Auscultation] : lungs were clear to auscultation bilaterally [Normal S1, S2] : normal S1 and S2 [Normal Rate] : heart rate was normal [Regular Rhythm] : with a regular rhythm [No Edema] : no peripheral edema [Pedal Pulses Normal] : the pedal pulses are present [Normal Bowel Sounds] : normal bowel sounds [Not Tender] : non-tender [Not Distended] : not distended [Soft] : abdomen soft [Normal Anterior Cervical Nodes] : no anterior cervical lymphadenopathy [Normal Posterior Cervical Nodes] : no posterior cervical lymphadenopathy [No Spinal Tenderness] : no spinal tenderness [Spine Straight] : spine straight [No Stigmata of Cushings Syndrome] : no stigmata of Cushings Syndrome [Normal Gait] : normal gait [Normal Strength/Tone] : muscle strength and tone were normal [No Rash] : no rash [Acanthosis Nigricans] : no acanthosis nigricans [Normal Reflexes] : deep tendon reflexes were 2+ and symmetric [No Tremors] : no tremors [Oriented x3] : oriented to person, place, and time [de-identified] : symmetrically enlarged, non-tender

## 2023-02-28 ENCOUNTER — OUTPATIENT (OUTPATIENT)
Dept: OUTPATIENT SERVICES | Facility: HOSPITAL | Age: 68
LOS: 1 days | Discharge: HOME | End: 2023-02-28

## 2023-02-28 DIAGNOSIS — Z90.49 ACQUIRED ABSENCE OF OTHER SPECIFIED PARTS OF DIGESTIVE TRACT: Chronic | ICD-10-CM

## 2023-02-28 DIAGNOSIS — C34.90 MALIGNANT NEOPLASM OF UNSPECIFIED PART OF UNSPECIFIED BRONCHUS OR LUNG: ICD-10-CM

## 2023-03-07 ENCOUNTER — APPOINTMENT (OUTPATIENT)
Dept: HEMATOLOGY ONCOLOGY | Facility: CLINIC | Age: 68
End: 2023-03-07
Payer: MEDICARE

## 2023-03-07 ENCOUNTER — RESULT REVIEW (OUTPATIENT)
Age: 68
End: 2023-03-07

## 2023-03-07 ENCOUNTER — APPOINTMENT (OUTPATIENT)
Dept: INFUSION THERAPY | Facility: HOSPITAL | Age: 68
End: 2023-03-07

## 2023-03-07 VITALS
SYSTOLIC BLOOD PRESSURE: 121 MMHG | TEMPERATURE: 96.8 F | DIASTOLIC BLOOD PRESSURE: 83 MMHG | OXYGEN SATURATION: 99 % | RESPIRATION RATE: 16 BRPM | WEIGHT: 147.05 LBS | BODY MASS INDEX: 26.05 KG/M2 | HEART RATE: 90 BPM

## 2023-03-07 DIAGNOSIS — Z51.11 ENCOUNTER FOR ANTINEOPLASTIC CHEMOTHERAPY: ICD-10-CM

## 2023-03-07 LAB
ALBUMIN SERPL ELPH-MCNC: 4.4 G/DL — SIGNIFICANT CHANGE UP (ref 3.3–5)
ALP SERPL-CCNC: 53 U/L — SIGNIFICANT CHANGE UP (ref 40–120)
ALT FLD-CCNC: 10 U/L — SIGNIFICANT CHANGE UP (ref 10–45)
ANION GAP SERPL CALC-SCNC: 11 MMOL/L — SIGNIFICANT CHANGE UP (ref 5–17)
AST SERPL-CCNC: 17 U/L — SIGNIFICANT CHANGE UP (ref 10–40)
BASOPHILS # BLD AUTO: 0.08 K/UL — SIGNIFICANT CHANGE UP (ref 0–0.2)
BASOPHILS NFR BLD AUTO: 1.1 % — SIGNIFICANT CHANGE UP (ref 0–2)
BILIRUB SERPL-MCNC: 0.4 MG/DL — SIGNIFICANT CHANGE UP (ref 0.2–1.2)
BUN SERPL-MCNC: 27 MG/DL — HIGH (ref 7–23)
CALCIUM SERPL-MCNC: 9.7 MG/DL — SIGNIFICANT CHANGE UP (ref 8.4–10.5)
CHLORIDE SERPL-SCNC: 100 MMOL/L — SIGNIFICANT CHANGE UP (ref 96–108)
CO2 SERPL-SCNC: 26 MMOL/L — SIGNIFICANT CHANGE UP (ref 22–31)
CREAT SERPL-MCNC: 1.24 MG/DL — SIGNIFICANT CHANGE UP (ref 0.5–1.3)
EGFR: 48 ML/MIN/1.73M2 — LOW
EOSINOPHIL # BLD AUTO: 0.23 K/UL — SIGNIFICANT CHANGE UP (ref 0–0.5)
EOSINOPHIL NFR BLD AUTO: 3.2 % — SIGNIFICANT CHANGE UP (ref 0–6)
FERRITIN SERPL-MCNC: 620 NG/ML — HIGH (ref 15–150)
FOLATE SERPL-MCNC: 13.7 NG/ML — SIGNIFICANT CHANGE UP
GLUCOSE SERPL-MCNC: 95 MG/DL — SIGNIFICANT CHANGE UP (ref 70–99)
HCT VFR BLD CALC: 38.5 % — SIGNIFICANT CHANGE UP (ref 34.5–45)
HGB BLD-MCNC: 12.5 G/DL — SIGNIFICANT CHANGE UP (ref 11.5–15.5)
IMM GRANULOCYTES NFR BLD AUTO: 0.1 % — SIGNIFICANT CHANGE UP (ref 0–0.9)
IRON SATN MFR SERPL: 25 % — SIGNIFICANT CHANGE UP (ref 14–50)
IRON SATN MFR SERPL: 82 UG/DL — SIGNIFICANT CHANGE UP (ref 30–160)
LYMPHOCYTES # BLD AUTO: 2.67 K/UL — SIGNIFICANT CHANGE UP (ref 1–3.3)
LYMPHOCYTES # BLD AUTO: 37.7 % — SIGNIFICANT CHANGE UP (ref 13–44)
MCHC RBC-ENTMCNC: 29.5 PG — SIGNIFICANT CHANGE UP (ref 27–34)
MCHC RBC-ENTMCNC: 32.5 G/DL — SIGNIFICANT CHANGE UP (ref 32–36)
MCV RBC AUTO: 90.8 FL — SIGNIFICANT CHANGE UP (ref 80–100)
MONOCYTES # BLD AUTO: 0.86 K/UL — SIGNIFICANT CHANGE UP (ref 0–0.9)
MONOCYTES NFR BLD AUTO: 12.1 % — SIGNIFICANT CHANGE UP (ref 2–14)
NEUTROPHILS # BLD AUTO: 3.23 K/UL — SIGNIFICANT CHANGE UP (ref 1.8–7.4)
NEUTROPHILS NFR BLD AUTO: 45.8 % — SIGNIFICANT CHANGE UP (ref 43–77)
NRBC # BLD: 0 /100 WBCS — SIGNIFICANT CHANGE UP (ref 0–0)
PLATELET # BLD AUTO: 267 K/UL — SIGNIFICANT CHANGE UP (ref 150–400)
POTASSIUM SERPL-MCNC: 4.4 MMOL/L — SIGNIFICANT CHANGE UP (ref 3.5–5.3)
POTASSIUM SERPL-SCNC: 4.4 MMOL/L — SIGNIFICANT CHANGE UP (ref 3.5–5.3)
PROT SERPL-MCNC: 7.6 G/DL — SIGNIFICANT CHANGE UP (ref 6–8.3)
RBC # BLD: 4.24 M/UL — SIGNIFICANT CHANGE UP (ref 3.8–5.2)
RBC # FLD: 14.6 % — HIGH (ref 10.3–14.5)
SODIUM SERPL-SCNC: 137 MMOL/L — SIGNIFICANT CHANGE UP (ref 135–145)
TIBC SERPL-MCNC: 331 UG/DL — SIGNIFICANT CHANGE UP (ref 220–430)
UIBC SERPL-MCNC: 249 UG/DL — SIGNIFICANT CHANGE UP (ref 110–370)
VIT B12 SERPL-MCNC: 553 PG/ML — SIGNIFICANT CHANGE UP (ref 232–1245)
WBC # BLD: 7.08 K/UL — SIGNIFICANT CHANGE UP (ref 3.8–10.5)
WBC # FLD AUTO: 7.08 K/UL — SIGNIFICANT CHANGE UP (ref 3.8–10.5)

## 2023-03-07 PROCEDURE — 99213 OFFICE O/P EST LOW 20 MIN: CPT

## 2023-03-08 NOTE — HISTORY OF PRESENT ILLNESS
[Disease: _____________________] : Disease: [unfilled] [T: ___] : T[unfilled] [N: ___] : N[unfilled] [M: ___] : M[unfilled] [AJCC Stage: ____] : AJCC Stage: [unfilled] [de-identified] : 66F with extensive smoking history, who quit in July 2022, noted weight loss of about 25 pounds over the past 7-8 months.  She does report a history of hyperthyroidism and attributed the weight loss to that at first.  In late May 2022, her family noted neck swelling.  She saw her PCP and was sent for a thyroid ultrasound which was unremarkable per the patient.  Due to the weight loss, she was sent for a CXR that was abnormal.  This led to a CT chest performed in early July 2022 revealing a large right hilar/mediastinal mass measuring up to 7 cm with bilateral pulmonary nodules.  She was referred to thoracic surgery.  She underwent EBUS/bronchoscopy in late July 2022 with biopsy of the RUL and pretracheal lymph node revealing small cell carcinoma.  Initial staging CT revealed an 11cm partially calcified left adnexal mass felt to represent an exophytic fibroid.  She was felt to have extensive stage disease based on the lung nodules felt to represent metastases.  Began first line Carbo/Etoposide/Atezolizumab in August 2022; achieved [place].  Completed  4 cycles of chemo + immunotherapy in Oct 2022 followed by maintenance Atezolizumab as of Nov 2022. \par \par  [de-identified] : – Lung, RUL biopsy and pretracheal EBUS guided FNA 7/29/2022: Positive for malignant cells.  Small cell carcinoma. [de-identified] : Completed  4 cycles of chemo + immunotherapy in Oct 2022 followed by maintenance Atezolizumab as of Nov 2022.  She is seen today prior to maintenance Atezolizumab. \par \par Feeling well with no new areas of concern. Alopecia resolving. Breathing stable. \par Tolerating the single agent immunotherapy well.  Denies F/C/N/V/D or rash.\par Saw new endocrinologist on 2/10 with no changes in meds for management of hypercalcemia and hyperthyroidism.  \par Has and appointment with a new PCP in May.\par

## 2023-03-08 NOTE — PHYSICAL EXAM
[Fully active, able to carry on all pre-disease performance without restriction] : Status 0 - Fully active, able to carry on all pre-disease performance without restriction [Normal] : affect appropriate [de-identified] : No icterus [de-identified] : supple No LAD [de-identified] : MMM O/P Clear [de-identified] : Clear [de-identified] : S1 S2 [de-identified] : No edema [de-identified] : No spine/CVA tenderness

## 2023-03-08 NOTE — ASSESSMENT
[FreeTextEntry1] : Extensive stage small cell lung cancer.  Began first line chemo + immunotherapy with Carboplatin/Etoposide/Atezolizumab in August 2022; achieved [place].  Completed  4 cycles of chemo + immunotherapy in Oct 2022 followed by maintenance Atezolizumab as of Nov 2022.\par Restaging CT Jan 2023 with overall sustained response; there are non-specific nodules that can be monitored on subsequent scan.  Recommend:\par –Continue with Atezolizumab maintenance for as long as it benefits the patient\par –Repeat labs today\par -Cough improved without benzonatate\par –F/U Endocrine for management of hypercalcemia and hyperthyroidism\par -New PCP appointment in May\par –MRI Brain from 10/31/22 was negative.  There may be a pituitary microadenoma:  this can be addressed with Endocrine\par –She appears to have a left adnexal fibroid mass unrelated to her cancer diagnosis\par –Can consider role of incorporating consolidative thoracic RT during the course of her treatment\par –F/U prior to next cycle or sooner should problems arise.    \par -Plan on obtaining her next restaging scan in April (rec given) if she remains clinically stable

## 2023-03-31 ENCOUNTER — APPOINTMENT (OUTPATIENT)
Dept: CT IMAGING | Facility: IMAGING CENTER | Age: 68
End: 2023-03-31
Payer: MEDICARE

## 2023-03-31 ENCOUNTER — OUTPATIENT (OUTPATIENT)
Dept: OUTPATIENT SERVICES | Facility: HOSPITAL | Age: 68
LOS: 1 days | End: 2023-03-31
Payer: MEDICARE

## 2023-03-31 DIAGNOSIS — Z90.49 ACQUIRED ABSENCE OF OTHER SPECIFIED PARTS OF DIGESTIVE TRACT: Chronic | ICD-10-CM

## 2023-03-31 DIAGNOSIS — C34.01 MALIGNANT NEOPLASM OF RIGHT MAIN BRONCHUS: ICD-10-CM

## 2023-03-31 PROCEDURE — 74177 CT ABD & PELVIS W/CONTRAST: CPT

## 2023-03-31 PROCEDURE — 71260 CT THORAX DX C+: CPT | Mod: 26,MH

## 2023-03-31 PROCEDURE — 71260 CT THORAX DX C+: CPT

## 2023-03-31 PROCEDURE — 74177 CT ABD & PELVIS W/CONTRAST: CPT | Mod: 26,MH

## 2023-04-04 ENCOUNTER — RESULT REVIEW (OUTPATIENT)
Age: 68
End: 2023-04-04

## 2023-04-04 ENCOUNTER — APPOINTMENT (OUTPATIENT)
Dept: INFUSION THERAPY | Facility: HOSPITAL | Age: 68
End: 2023-04-04

## 2023-04-04 ENCOUNTER — APPOINTMENT (OUTPATIENT)
Dept: HEMATOLOGY ONCOLOGY | Facility: CLINIC | Age: 68
End: 2023-04-04
Payer: MEDICARE

## 2023-04-04 VITALS
OXYGEN SATURATION: 93 % | WEIGHT: 153.66 LBS | HEART RATE: 93 BPM | DIASTOLIC BLOOD PRESSURE: 86 MMHG | RESPIRATION RATE: 16 BRPM | TEMPERATURE: 96.6 F | SYSTOLIC BLOOD PRESSURE: 145 MMHG | BODY MASS INDEX: 27.22 KG/M2

## 2023-04-04 DIAGNOSIS — L30.9 DERMATITIS, UNSPECIFIED: ICD-10-CM

## 2023-04-04 LAB
ALBUMIN SERPL ELPH-MCNC: 4.2 G/DL — SIGNIFICANT CHANGE UP (ref 3.3–5)
ALP SERPL-CCNC: 50 U/L — SIGNIFICANT CHANGE UP (ref 40–120)
ALT FLD-CCNC: 11 U/L — SIGNIFICANT CHANGE UP (ref 10–45)
ANION GAP SERPL CALC-SCNC: 13 MMOL/L — SIGNIFICANT CHANGE UP (ref 5–17)
AST SERPL-CCNC: 13 U/L — SIGNIFICANT CHANGE UP (ref 10–40)
BASOPHILS # BLD AUTO: 0.09 K/UL — SIGNIFICANT CHANGE UP (ref 0–0.2)
BASOPHILS NFR BLD AUTO: 1.1 % — SIGNIFICANT CHANGE UP (ref 0–2)
BILIRUB SERPL-MCNC: 0.3 MG/DL — SIGNIFICANT CHANGE UP (ref 0.2–1.2)
BUN SERPL-MCNC: 25 MG/DL — HIGH (ref 7–23)
CALCIUM SERPL-MCNC: 9.3 MG/DL — SIGNIFICANT CHANGE UP (ref 8.4–10.5)
CHLORIDE SERPL-SCNC: 100 MMOL/L — SIGNIFICANT CHANGE UP (ref 96–108)
CO2 SERPL-SCNC: 25 MMOL/L — SIGNIFICANT CHANGE UP (ref 22–31)
CREAT SERPL-MCNC: 1.24 MG/DL — SIGNIFICANT CHANGE UP (ref 0.5–1.3)
EGFR: 48 ML/MIN/1.73M2 — LOW
EOSINOPHIL # BLD AUTO: 0.24 K/UL — SIGNIFICANT CHANGE UP (ref 0–0.5)
EOSINOPHIL NFR BLD AUTO: 2.8 % — SIGNIFICANT CHANGE UP (ref 0–6)
GLUCOSE SERPL-MCNC: 106 MG/DL — HIGH (ref 70–99)
HCT VFR BLD CALC: 35.1 % — SIGNIFICANT CHANGE UP (ref 34.5–45)
HGB BLD-MCNC: 11.3 G/DL — LOW (ref 11.5–15.5)
IMM GRANULOCYTES NFR BLD AUTO: 0.4 % — SIGNIFICANT CHANGE UP (ref 0–0.9)
LYMPHOCYTES # BLD AUTO: 2.89 K/UL — SIGNIFICANT CHANGE UP (ref 1–3.3)
LYMPHOCYTES # BLD AUTO: 34 % — SIGNIFICANT CHANGE UP (ref 13–44)
MCHC RBC-ENTMCNC: 29.7 PG — SIGNIFICANT CHANGE UP (ref 27–34)
MCHC RBC-ENTMCNC: 32.2 G/DL — SIGNIFICANT CHANGE UP (ref 32–36)
MCV RBC AUTO: 92.4 FL — SIGNIFICANT CHANGE UP (ref 80–100)
MONOCYTES # BLD AUTO: 1.1 K/UL — HIGH (ref 0–0.9)
MONOCYTES NFR BLD AUTO: 12.9 % — SIGNIFICANT CHANGE UP (ref 2–14)
NEUTROPHILS # BLD AUTO: 4.16 K/UL — SIGNIFICANT CHANGE UP (ref 1.8–7.4)
NEUTROPHILS NFR BLD AUTO: 48.8 % — SIGNIFICANT CHANGE UP (ref 43–77)
NRBC # BLD: 0 /100 WBCS — SIGNIFICANT CHANGE UP (ref 0–0)
PLATELET # BLD AUTO: 255 K/UL — SIGNIFICANT CHANGE UP (ref 150–400)
POTASSIUM SERPL-MCNC: 5.4 MMOL/L — HIGH (ref 3.5–5.3)
POTASSIUM SERPL-SCNC: 5.4 MMOL/L — HIGH (ref 3.5–5.3)
PROT SERPL-MCNC: 7.3 G/DL — SIGNIFICANT CHANGE UP (ref 6–8.3)
RBC # BLD: 3.8 M/UL — SIGNIFICANT CHANGE UP (ref 3.8–5.2)
RBC # FLD: 15.8 % — HIGH (ref 10.3–14.5)
SODIUM SERPL-SCNC: 139 MMOL/L — SIGNIFICANT CHANGE UP (ref 135–145)
T4 FREE SERPL-MCNC: 0.8 NG/DL — LOW (ref 0.9–1.8)
TSH SERPL-MCNC: 3.53 UIU/ML — SIGNIFICANT CHANGE UP (ref 0.27–4.2)
WBC # BLD: 8.51 K/UL — SIGNIFICANT CHANGE UP (ref 3.8–10.5)
WBC # FLD AUTO: 8.51 K/UL — SIGNIFICANT CHANGE UP (ref 3.8–10.5)

## 2023-04-04 PROCEDURE — 99214 OFFICE O/P EST MOD 30 MIN: CPT

## 2023-04-05 RX ORDER — FLUOCINOLONE ACETONIDE 0.25 MG/G
0.03 CREAM TOPICAL
Qty: 1 | Refills: 2 | Status: ACTIVE | COMMUNITY
Start: 2023-04-04 | End: 1900-01-01

## 2023-04-13 NOTE — RESULTS/DATA
[FreeTextEntry1] : – CT chest 7/7/2022: Large mediastinal mass measuring up to 7.0 cm extending into the right hilum and RUL parenchyma.  Differential considerations include a primary lung malignancy with mediastinal invasion versus primary mediastinal mass with extension into the lung parenchyma.  Mass narrows and encases the lobar right superior pulmonary artery and segmental branches and SVC.  There is abutment of the aortic arch branches by the mass.  Multiple bilateral pulmonary nodules measuring up to 1.4 cm.  \par -CT A/P 8/16: no evidence of metastatic disease in the abdomen or pelvis\par \par -CT C/A/P 9/26/22:  Comparison is made to outside CT chest July 7, 2022:  \par Favorable response to treatment as evidenced by significant interval decrease in size of right upper lobe pulmonary nodules, and interval decrease in size of multiple mediastinal lymph nodes. Stable 11.3 cm partially-calcified left adnexal mass. Pelvic MRI can be considered to exclude left adnexal pathology if clinically warranted.\par \par – MRI brain 10/31/2022: Negative for metastatic disease.  Probable pituitary microadenoma on the right.\par \par -CT C/A/P 1/2/23:  Since 9/26/2022:  Further decrease in size of right upper lobe nodules, representing known lung cancer.  New ill-defined right lower lobe nodule and slightly larger nodules abutting the left oblique fissure, of uncertain significance. 3 month follow-up is recommended.  Unchanged left adnexal mass.\par \par Images Reviewed/Interpreted:\par \par -CT C/A/P 3/31/23:  Few bilateral subcentimeter pulmonary nodules are unchanged since January 2, 2023.  Unchanged left adnexal mass.\par \par

## 2023-04-13 NOTE — PHYSICAL EXAM
[Fully active, able to carry on all pre-disease performance without restriction] : Status 0 - Fully active, able to carry on all pre-disease performance without restriction [Normal] : affect appropriate [de-identified] : No icterus [de-identified] : MMM O/P Clear [de-identified] : supple No LAD [de-identified] : Clear [de-identified] : S1 S2 [de-identified] : No edema [de-identified] : No spine/CVA tenderness [de-identified] : scattered skin darkening/dermatitis

## 2023-04-13 NOTE — ASSESSMENT
[FreeTextEntry1] : Extensive stage small cell lung cancer.  Began first line chemo + immunotherapy with Carboplatin/Etoposide/Atezolizumab in August 2022; achieved [place].  Completed  4 cycles of chemo + immunotherapy in Oct 2022 followed by maintenance Atezolizumab as of Nov 2022.\par Restaging CT Late March 2023 with overall sustained response.  Recommend:\par –Continue with Atezolizumab maintenance for as long as it benefits the patient\par –Repeat labs today\par –F/U with Endocrine for management of hypercalcemia and hyperthyroidism\par –MRI Brain from 10/31/22 was negative.  There may be a pituitary microadenoma.  ***Can plan to order a surveillance Brain MRI prior to her Late may appointment to monitor her brain given her diagnosis; this can be done at her early May visit***.  The pituitary adenoma can be addressed with Endocrine\par –She appears to have a left adnexal fibroid mass unrelated to her cancer diagnosis\par –Can consider role of incorporating consolidative thoracic RT during the course of her treatment\par –F/U prior to next cycle or sooner should problems arise.    \par -Dermatitis:  possibly related to immunotherapy.  Trial of topical steroid.  Can consider Derm evaluation.

## 2023-04-13 NOTE — HISTORY OF PRESENT ILLNESS
[Disease: _____________________] : Disease: [unfilled] [T: ___] : T[unfilled] [N: ___] : N[unfilled] [M: ___] : M[unfilled] [AJCC Stage: ____] : AJCC Stage: [unfilled] [de-identified] : 66F with extensive smoking history, who quit in July 2022, noted weight loss of about 25 pounds over the past 7-8 months.  She does report a history of hyperthyroidism and attributed the weight loss to that at first.  In late May 2022, her family noted neck swelling.  She saw her PCP and was sent for a thyroid ultrasound which was unremarkable per the patient.  Due to the weight loss, she was sent for a CXR that was abnormal.  This led to a CT chest performed in early July 2022 revealing a large right hilar/mediastinal mass measuring up to 7 cm with bilateral pulmonary nodules.  She was referred to thoracic surgery.  She underwent EBUS/bronchoscopy in late July 2022 with biopsy of the RUL and pretracheal lymph node revealing small cell carcinoma.  Initial staging CT revealed an 11cm partially calcified left adnexal mass felt to represent an exophytic fibroid.  She was felt to have extensive stage disease based on the lung nodules felt to represent metastases.  Began first line Carbo/Etoposide/Atezolizumab in August 2022; achieved [place].  Completed  4 cycles of chemo + immunotherapy in Oct 2022 followed by maintenance Atezolizumab as of Nov 2022. \par \par  [de-identified] : – Lung, RUL biopsy and pretracheal EBUS guided FNA 7/29/2022: Positive for malignant cells.  Small cell carcinoma. [de-identified] : Completed  4 cycles of chemo + immunotherapy in Oct 2022 followed by maintenance Atezolizumab as of Nov 2022.  She is seen today prior to maintenance Atezolizumab. \par \par Reports pruritic areas on her skin.\par Restaging CT scan official results were not available at the time of the appointment; subsequently called her on 4/5 informing her the CT scan revealed an overall sustained response to therapy.

## 2023-04-20 ENCOUNTER — NON-APPOINTMENT (OUTPATIENT)
Age: 68
End: 2023-04-20

## 2023-04-25 ENCOUNTER — OUTPATIENT (OUTPATIENT)
Dept: OUTPATIENT SERVICES | Facility: HOSPITAL | Age: 68
LOS: 1 days | Discharge: HOME | End: 2023-04-25

## 2023-04-25 DIAGNOSIS — C34.90 MALIGNANT NEOPLASM OF UNSPECIFIED PART OF UNSPECIFIED BRONCHUS OR LUNG: ICD-10-CM

## 2023-04-25 DIAGNOSIS — Z90.49 ACQUIRED ABSENCE OF OTHER SPECIFIED PARTS OF DIGESTIVE TRACT: Chronic | ICD-10-CM

## 2023-05-02 ENCOUNTER — APPOINTMENT (OUTPATIENT)
Dept: INFUSION THERAPY | Facility: HOSPITAL | Age: 68
End: 2023-05-02

## 2023-05-02 ENCOUNTER — RESULT REVIEW (OUTPATIENT)
Age: 68
End: 2023-05-02

## 2023-05-02 ENCOUNTER — NON-APPOINTMENT (OUTPATIENT)
Age: 68
End: 2023-05-02

## 2023-05-02 ENCOUNTER — APPOINTMENT (OUTPATIENT)
Dept: HEMATOLOGY ONCOLOGY | Facility: CLINIC | Age: 68
End: 2023-05-02
Payer: MEDICARE

## 2023-05-02 VITALS
HEIGHT: 63 IN | DIASTOLIC BLOOD PRESSURE: 67 MMHG | SYSTOLIC BLOOD PRESSURE: 111 MMHG | RESPIRATION RATE: 16 BRPM | OXYGEN SATURATION: 93 % | TEMPERATURE: 97.1 F | WEIGHT: 152.54 LBS | HEART RATE: 92 BPM | BODY MASS INDEX: 27.03 KG/M2

## 2023-05-02 DIAGNOSIS — Z51.11 ENCOUNTER FOR ANTINEOPLASTIC CHEMOTHERAPY: ICD-10-CM

## 2023-05-02 LAB
BASOPHILS # BLD AUTO: 0.07 K/UL — SIGNIFICANT CHANGE UP (ref 0–0.2)
BASOPHILS NFR BLD AUTO: 1 % — SIGNIFICANT CHANGE UP (ref 0–2)
EOSINOPHIL # BLD AUTO: 0.12 K/UL — SIGNIFICANT CHANGE UP (ref 0–0.5)
EOSINOPHIL NFR BLD AUTO: 1.6 % — SIGNIFICANT CHANGE UP (ref 0–6)
HCT VFR BLD CALC: 36.4 % — SIGNIFICANT CHANGE UP (ref 34.5–45)
HGB BLD-MCNC: 12 G/DL — SIGNIFICANT CHANGE UP (ref 11.5–15.5)
IMM GRANULOCYTES NFR BLD AUTO: 0.1 % — SIGNIFICANT CHANGE UP (ref 0–0.9)
LYMPHOCYTES # BLD AUTO: 2.57 K/UL — SIGNIFICANT CHANGE UP (ref 1–3.3)
LYMPHOCYTES # BLD AUTO: 35.3 % — SIGNIFICANT CHANGE UP (ref 13–44)
MCHC RBC-ENTMCNC: 30.8 PG — SIGNIFICANT CHANGE UP (ref 27–34)
MCHC RBC-ENTMCNC: 33 G/DL — SIGNIFICANT CHANGE UP (ref 32–36)
MCV RBC AUTO: 93.6 FL — SIGNIFICANT CHANGE UP (ref 80–100)
MONOCYTES # BLD AUTO: 0.88 K/UL — SIGNIFICANT CHANGE UP (ref 0–0.9)
MONOCYTES NFR BLD AUTO: 12.1 % — SIGNIFICANT CHANGE UP (ref 2–14)
NEUTROPHILS # BLD AUTO: 3.63 K/UL — SIGNIFICANT CHANGE UP (ref 1.8–7.4)
NEUTROPHILS NFR BLD AUTO: 49.9 % — SIGNIFICANT CHANGE UP (ref 43–77)
NRBC # BLD: 0 /100 WBCS — SIGNIFICANT CHANGE UP (ref 0–0)
PLATELET # BLD AUTO: 252 K/UL — SIGNIFICANT CHANGE UP (ref 150–400)
RBC # BLD: 3.89 M/UL — SIGNIFICANT CHANGE UP (ref 3.8–5.2)
RBC # FLD: 15.9 % — HIGH (ref 10.3–14.5)
WBC # BLD: 7.28 K/UL — SIGNIFICANT CHANGE UP (ref 3.8–10.5)
WBC # FLD AUTO: 7.28 K/UL — SIGNIFICANT CHANGE UP (ref 3.8–10.5)

## 2023-05-02 PROCEDURE — 99213 OFFICE O/P EST LOW 20 MIN: CPT

## 2023-05-03 LAB
ALBUMIN SERPL ELPH-MCNC: 4.5 G/DL — SIGNIFICANT CHANGE UP (ref 3.3–5)
ALP SERPL-CCNC: 51 U/L — SIGNIFICANT CHANGE UP (ref 40–120)
ALT FLD-CCNC: 8 U/L — LOW (ref 10–45)
ANION GAP SERPL CALC-SCNC: 13 MMOL/L — SIGNIFICANT CHANGE UP (ref 5–17)
AST SERPL-CCNC: 14 U/L — SIGNIFICANT CHANGE UP (ref 10–40)
BILIRUB SERPL-MCNC: 0.4 MG/DL — SIGNIFICANT CHANGE UP (ref 0.2–1.2)
BUN SERPL-MCNC: 25 MG/DL — HIGH (ref 7–23)
CALCIUM SERPL-MCNC: 9.9 MG/DL — SIGNIFICANT CHANGE UP (ref 8.4–10.5)
CHLORIDE SERPL-SCNC: 103 MMOL/L — SIGNIFICANT CHANGE UP (ref 96–108)
CO2 SERPL-SCNC: 24 MMOL/L — SIGNIFICANT CHANGE UP (ref 22–31)
CREAT SERPL-MCNC: 1.31 MG/DL — HIGH (ref 0.5–1.3)
EGFR: 45 ML/MIN/1.73M2 — LOW
GLUCOSE SERPL-MCNC: 103 MG/DL — HIGH (ref 70–99)
POTASSIUM SERPL-MCNC: 4.8 MMOL/L — SIGNIFICANT CHANGE UP (ref 3.5–5.3)
POTASSIUM SERPL-SCNC: 4.8 MMOL/L — SIGNIFICANT CHANGE UP (ref 3.5–5.3)
PROT SERPL-MCNC: 7.5 G/DL — SIGNIFICANT CHANGE UP (ref 6–8.3)
SODIUM SERPL-SCNC: 139 MMOL/L — SIGNIFICANT CHANGE UP (ref 135–145)

## 2023-05-06 NOTE — HISTORY OF PRESENT ILLNESS
[Disease: _____________________] : Disease: [unfilled] [T: ___] : T[unfilled] [N: ___] : N[unfilled] [M: ___] : M[unfilled] [AJCC Stage: ____] : AJCC Stage: [unfilled] [de-identified] : 66F with extensive smoking history, who quit in July 2022, noted weight loss of about 25 pounds over the past 7-8 months.  She does report a history of hyperthyroidism and attributed the weight loss to that at first.  In late May 2022, her family noted neck swelling.  She saw her PCP and was sent for a thyroid ultrasound which was unremarkable per the patient.  Due to the weight loss, she was sent for a CXR that was abnormal.  This led to a CT chest performed in early July 2022 revealing a large right hilar/mediastinal mass measuring up to 7 cm with bilateral pulmonary nodules.  She was referred to thoracic surgery.  She underwent EBUS/bronchoscopy in late July 2022 with biopsy of the RUL and pretracheal lymph node revealing small cell carcinoma.  Initial staging CT revealed an 11cm partially calcified left adnexal mass felt to represent an exophytic fibroid.  She was felt to have extensive stage disease based on the lung nodules felt to represent metastases.  Began first line Carbo/Etoposide/Atezolizumab in August 2022; achieved [place].  Completed  4 cycles of chemo + immunotherapy in Oct 2022 followed by maintenance Atezolizumab as of Nov 2022. \par \par  [de-identified] : – Lung, RUL biopsy and pretracheal EBUS guided FNA 7/29/2022: Positive for malignant cells.  Small cell carcinoma. [de-identified] : Completed  4 cycles of chemo + immunotherapy in Oct 2022 followed by maintenance Atezolizumab as of Nov 2022.  She is seen today prior to maintenance Atezolizumab. \par \par Feeling well. Reports pruritic areas on her skin for which she uses a skin ointment with relief. \par Reports compliance with methimazsole and has f/u with endocrine on 6/9. \par \par

## 2023-05-06 NOTE — RESULTS/DATA
[FreeTextEntry1] : – CT chest 7/7/2022: Large mediastinal mass measuring up to 7.0 cm extending into the right hilum and RUL parenchyma.  Differential considerations include a primary lung malignancy with mediastinal invasion versus primary mediastinal mass with extension into the lung parenchyma.  Mass narrows and encases the lobar right superior pulmonary artery and segmental branches and SVC.  There is abutment of the aortic arch branches by the mass.  Multiple bilateral pulmonary nodules measuring up to 1.4 cm.  \par -CT A/P 8/16: no evidence of metastatic disease in the abdomen or pelvis\par \par -CT C/A/P 9/26/22:  Comparison is made to outside CT chest July 7, 2022:  \par Favorable response to treatment as evidenced by significant interval decrease in size of right upper lobe pulmonary nodules, and interval decrease in size of multiple mediastinal lymph nodes. Stable 11.3 cm partially-calcified left adnexal mass. Pelvic MRI can be considered to exclude left adnexal pathology if clinically warranted.\par \par – MRI brain 10/31/2022: Negative for metastatic disease.  Probable pituitary microadenoma on the right.\par \par -CT C/A/P 1/2/23:  Since 9/26/2022:  Further decrease in size of right upper lobe nodules, representing known lung cancer.  New ill-defined right lower lobe nodule and slightly larger nodules abutting the left oblique fissure, of uncertain significance. 3 month follow-up is recommended.  Unchanged left adnexal mass.\par \par -CT C/A/P 3/31/23:  Few bilateral subcentimeter pulmonary nodules are unchanged since January 2, 2023.  Unchanged left adnexal mass.\par \par

## 2023-05-06 NOTE — ASSESSMENT
[FreeTextEntry1] : Extensive stage small cell lung cancer.  Began first line chemo + immunotherapy with Carboplatin/Etoposide/Atezolizumab in August 2022; achieved [place].  Completed  4 cycles of chemo + immunotherapy in Oct 2022 followed by maintenance Atezolizumab as of Nov 2022.\par Restaging CT Late March 2023 with overall sustained response.  Recommend:\par –Continue with Atezolizumab maintenance for as long as it benefits the patient\par –Repeat labs today\par –F/U with Endocrine for management of hypercalcemia and hyperthyroidism\par –MRI Brain from 10/31/22 was negative. Will repeat MRI in late May prior to her next treatment and follow up appointments. The pituitary adenoma can be addressed with Endocrine\par –She appears to have a left adnexal fibroid mass unrelated to her cancer diagnosis\par –Can consider role of incorporating consolidative thoracic RT during the course of her treatment\par –F/U prior to next cycle or sooner should problems arise.    \par -Dermatitis:  possibly related to immunotherapy.  Trial of topical steroid.  Can consider Derm evaluation.

## 2023-05-06 NOTE — PHYSICAL EXAM
[Fully active, able to carry on all pre-disease performance without restriction] : Status 0 - Fully active, able to carry on all pre-disease performance without restriction [Normal] : affect appropriate [de-identified] : MMM O/P Clear [de-identified] : No icterus [de-identified] : supple No LAD [de-identified] : Clear [de-identified] : S1 S2 [de-identified] : No edema [de-identified] : No spine/CVA tenderness [de-identified] : scattered skin darkening/dermatitis

## 2023-05-30 ENCOUNTER — RESULT REVIEW (OUTPATIENT)
Age: 68
End: 2023-05-30

## 2023-05-30 ENCOUNTER — APPOINTMENT (OUTPATIENT)
Dept: INFUSION THERAPY | Facility: HOSPITAL | Age: 68
End: 2023-05-30

## 2023-05-30 ENCOUNTER — APPOINTMENT (OUTPATIENT)
Dept: HEMATOLOGY ONCOLOGY | Facility: CLINIC | Age: 68
End: 2023-05-30
Payer: MEDICARE

## 2023-05-30 VITALS
HEART RATE: 101 BPM | DIASTOLIC BLOOD PRESSURE: 89 MMHG | SYSTOLIC BLOOD PRESSURE: 156 MMHG | TEMPERATURE: 96.8 F | RESPIRATION RATE: 16 BRPM | BODY MASS INDEX: 27.45 KG/M2 | OXYGEN SATURATION: 93 % | WEIGHT: 154.98 LBS

## 2023-05-30 LAB
ALBUMIN SERPL ELPH-MCNC: 4.6 G/DL — SIGNIFICANT CHANGE UP (ref 3.3–5)
ALP SERPL-CCNC: 51 U/L — SIGNIFICANT CHANGE UP (ref 40–120)
ALT FLD-CCNC: 14 U/L — SIGNIFICANT CHANGE UP (ref 10–45)
ANION GAP SERPL CALC-SCNC: 14 MMOL/L — SIGNIFICANT CHANGE UP (ref 5–17)
AST SERPL-CCNC: 22 U/L — SIGNIFICANT CHANGE UP (ref 10–40)
BASOPHILS # BLD AUTO: 0.07 K/UL — SIGNIFICANT CHANGE UP (ref 0–0.2)
BASOPHILS NFR BLD AUTO: 0.8 % — SIGNIFICANT CHANGE UP (ref 0–2)
BILIRUB SERPL-MCNC: 0.3 MG/DL — SIGNIFICANT CHANGE UP (ref 0.2–1.2)
BUN SERPL-MCNC: 24 MG/DL — HIGH (ref 7–23)
CALCIUM SERPL-MCNC: 9.9 MG/DL — SIGNIFICANT CHANGE UP (ref 8.4–10.5)
CHLORIDE SERPL-SCNC: 102 MMOL/L — SIGNIFICANT CHANGE UP (ref 96–108)
CO2 SERPL-SCNC: 25 MMOL/L — SIGNIFICANT CHANGE UP (ref 22–31)
CREAT SERPL-MCNC: 1.49 MG/DL — HIGH (ref 0.5–1.3)
EGFR: 39 ML/MIN/1.73M2 — LOW
EOSINOPHIL # BLD AUTO: 0.23 K/UL — SIGNIFICANT CHANGE UP (ref 0–0.5)
EOSINOPHIL NFR BLD AUTO: 2.5 % — SIGNIFICANT CHANGE UP (ref 0–6)
GLUCOSE SERPL-MCNC: 84 MG/DL — SIGNIFICANT CHANGE UP (ref 70–99)
HCT VFR BLD CALC: 39 % — SIGNIFICANT CHANGE UP (ref 34.5–45)
HGB BLD-MCNC: 12.9 G/DL — SIGNIFICANT CHANGE UP (ref 11.5–15.5)
IMM GRANULOCYTES NFR BLD AUTO: 0.3 % — SIGNIFICANT CHANGE UP (ref 0–0.9)
LYMPHOCYTES # BLD AUTO: 2.46 K/UL — SIGNIFICANT CHANGE UP (ref 1–3.3)
LYMPHOCYTES # BLD AUTO: 26.7 % — SIGNIFICANT CHANGE UP (ref 13–44)
MCHC RBC-ENTMCNC: 31.2 PG — SIGNIFICANT CHANGE UP (ref 27–34)
MCHC RBC-ENTMCNC: 33.1 G/DL — SIGNIFICANT CHANGE UP (ref 32–36)
MCV RBC AUTO: 94.2 FL — SIGNIFICANT CHANGE UP (ref 80–100)
MONOCYTES # BLD AUTO: 0.99 K/UL — HIGH (ref 0–0.9)
MONOCYTES NFR BLD AUTO: 10.8 % — SIGNIFICANT CHANGE UP (ref 2–14)
NEUTROPHILS # BLD AUTO: 5.42 K/UL — SIGNIFICANT CHANGE UP (ref 1.8–7.4)
NEUTROPHILS NFR BLD AUTO: 58.9 % — SIGNIFICANT CHANGE UP (ref 43–77)
NRBC # BLD: 0 /100 WBCS — SIGNIFICANT CHANGE UP (ref 0–0)
PLATELET # BLD AUTO: 257 K/UL — SIGNIFICANT CHANGE UP (ref 150–400)
POTASSIUM SERPL-MCNC: 5.2 MMOL/L — SIGNIFICANT CHANGE UP (ref 3.5–5.3)
POTASSIUM SERPL-SCNC: 5.2 MMOL/L — SIGNIFICANT CHANGE UP (ref 3.5–5.3)
PROT SERPL-MCNC: 7.7 G/DL — SIGNIFICANT CHANGE UP (ref 6–8.3)
RBC # BLD: 4.14 M/UL — SIGNIFICANT CHANGE UP (ref 3.8–5.2)
RBC # FLD: 14.3 % — SIGNIFICANT CHANGE UP (ref 10.3–14.5)
SODIUM SERPL-SCNC: 140 MMOL/L — SIGNIFICANT CHANGE UP (ref 135–145)
T4 FREE SERPL-MCNC: 1.1 NG/DL — SIGNIFICANT CHANGE UP (ref 0.9–1.8)
TSH SERPL-MCNC: 1.17 UIU/ML — SIGNIFICANT CHANGE UP (ref 0.27–4.2)
WBC # BLD: 9.2 K/UL — SIGNIFICANT CHANGE UP (ref 3.8–10.5)
WBC # FLD AUTO: 9.2 K/UL — SIGNIFICANT CHANGE UP (ref 3.8–10.5)

## 2023-05-30 PROCEDURE — 99214 OFFICE O/P EST MOD 30 MIN: CPT

## 2023-06-06 NOTE — RESULTS/DATA
Best contact number for patient:[phone]    Emergency Contact name and Discesa David Alberts       Referring provider and telephone [address] [facility]    Primary Care Provider Name and if affiliated with [facility]: [facility] provider    Reason for Appointment/Dx: Headache     Neurology Location patient would like to be seen: [facility] received? Yes                                                Records Received? No    Have you ever seen another Neurologist?       No    Insurance Information    Insurance Name: [address] [place] [address]     ID/Policy #:    Secondary Insurance:    ID/Policy#: Workman's Comp/ Accident/ School  Information      Workman's Comp/Accident/School related?    No    If yes name of Insurance company: No    Date of Injury: No    Type of Injury: No     Name and Telephone Number: No    Notes: Appointment scheduled with patient new patient pack sent                    Appointment date: 7- 10:00am with Dr Verónica Ayoub [FreeTextEntry1] : – CT chest 7/7/2022: Large mediastinal mass measuring up to 7.0 cm extending into the right hilum and RUL parenchyma.  Differential considerations include a primary lung malignancy with mediastinal invasion versus primary mediastinal mass with extension into the lung parenchyma.  Mass narrows and encases the lobar right superior pulmonary artery and segmental branches and SVC.  There is abutment of the aortic arch branches by the mass.  Multiple bilateral pulmonary nodules measuring up to 1.4 cm.  \par -CT A/P 8/16: no evidence of metastatic disease in the abdomen or pelvis\par \par -CT C/A/P 9/26/22:  Comparison is made to outside CT chest July 7, 2022:  \par Favorable response to treatment as evidenced by significant interval decrease in size of right upper lobe pulmonary nodules, and interval decrease in size of multiple mediastinal lymph nodes. Stable 11.3 cm partially-calcified left adnexal mass. Pelvic MRI can be considered to exclude left adnexal pathology if clinically warranted.\par \par – MRI brain 10/31/2022: Negative for metastatic disease.  Probable pituitary microadenoma on the right.\par \par -CT C/A/P 1/2/23:  Since 9/26/2022:  Further decrease in size of right upper lobe nodules, representing known lung cancer.  New ill-defined right lower lobe nodule and slightly larger nodules abutting the left oblique fissure, of uncertain significance. 3 month follow-up is recommended.  Unchanged left adnexal mass.\par \par -CT C/A/P 3/31/23:  Few bilateral subcentimeter pulmonary nodules are unchanged since January 2, 2023.  Unchanged left adnexal mass.\par \par

## 2023-06-06 NOTE — PHYSICAL EXAM
[Fully active, able to carry on all pre-disease performance without restriction] : Status 0 - Fully active, able to carry on all pre-disease performance without restriction [Normal] : affect appropriate [de-identified] : No icterus [de-identified] : MMM O/P Clear [de-identified] : supple No LAD [de-identified] : Clear [de-identified] : S1 S2 [de-identified] : No edema [de-identified] : No spine/CVA tenderness [de-identified] : scattered skin darkening

## 2023-06-06 NOTE — ASSESSMENT
[FreeTextEntry1] : Extensive stage small cell lung cancer.  Began first line chemo + immunotherapy with Carboplatin/Etoposide/Atezolizumab in August 2022; achieved [place].  Completed  4 cycles of chemo + immunotherapy in Oct 2022 followed by maintenance Atezolizumab as of Nov 2022.\par Restaging CT Late March 2023 with overall sustained response.  Recommend:\par –Continue with Atezolizumab maintenance for as long as it benefits the patient\par –Repeat labs today\par –F/U with Endocrine for management of hypercalcemia and hyperthyroidism.  Monitor TFT’s.  \par –MRI Brain from 10/31/22 was negative. Going for repeat MRI tomorrow. The pituitary adenoma can be addressed with Endocrine\par –She appears to have a left adnexal fibroid mass unrelated to her cancer diagnosis\par -Dermatitis:  possibly related to immunotherapy.  Utilizing topical steroid.  Can consider Derm evaluation.  \par –Can consider role of incorporating consolidative thoracic RT during the course of her treatment\par -Obtain PET/CT prior to her next cycle for restaging and to establish that disease is confined to thorax prior to consideration of consolidative thoracic RT.  Can refer to Rad-Onc if appropriate. \par –F/U prior to next cycle or sooner should problems arise.

## 2023-06-06 NOTE — HISTORY OF PRESENT ILLNESS
[Disease: _____________________] : Disease: [unfilled] [T: ___] : T[unfilled] [N: ___] : N[unfilled] [M: ___] : M[unfilled] [AJCC Stage: ____] : AJCC Stage: [unfilled] [de-identified] : 66F with extensive smoking history, who quit in July 2022, noted weight loss of about 25 pounds over the past 7-8 months.  She does report a history of hyperthyroidism and attributed the weight loss to that at first.  In late May 2022, her family noted neck swelling.  She saw her PCP and was sent for a thyroid ultrasound which was unremarkable per the patient.  Due to the weight loss, she was sent for a CXR that was abnormal.  This led to a CT chest performed in early July 2022 revealing a large right hilar/mediastinal mass measuring up to 7 cm with bilateral pulmonary nodules.  She was referred to thoracic surgery.  She underwent EBUS/bronchoscopy in late July 2022 with biopsy of the RUL and pretracheal lymph node revealing small cell carcinoma.  Initial staging CT revealed an 11cm partially calcified left adnexal mass felt to represent an exophytic fibroid.  She was felt to have extensive stage disease based on the lung nodules felt to represent metastases.  Began first line Carbo/Etoposide/Atezolizumab in August 2022; achieved [place].  Completed  4 cycles of chemo + immunotherapy in Oct 2022 followed by maintenance Atezolizumab as of Nov 2022. \par \par  [de-identified] : – Lung, RUL biopsy and pretracheal EBUS guided FNA 7/29/2022: Positive for malignant cells.  Small cell carcinoma. [de-identified] : Completed  4 cycles of chemo + immunotherapy in Oct 2022 followed by maintenance Atezolizumab as of Nov 2022.  She is seen today prior to maintenance Atezolizumab. \par \par She presents today with her son, who is a PA.  She feels overall fairly well.  \par No F/C/N/V/D.  \par She did not have MRI Brain prior to this visit; it is schedule at an outside/open facility tomorrow. \par \par Patient's case was reviewed at thoracic tumor board in April 2023.  Consideration of radiation oncology consultation for possible consolidative thoracic RT is being considered.  Recommendation was to obtain a PET/CT prior to consideration of thoracic RT.  Radiation oncology can also address possible PCI versus continued MRI surveillance of the brain.

## 2023-06-09 ENCOUNTER — APPOINTMENT (OUTPATIENT)
Dept: ENDOCRINOLOGY | Facility: CLINIC | Age: 68
End: 2023-06-09

## 2023-06-15 ENCOUNTER — APPOINTMENT (OUTPATIENT)
Dept: NUCLEAR MEDICINE | Facility: IMAGING CENTER | Age: 68
End: 2023-06-15
Payer: MEDICARE

## 2023-06-15 ENCOUNTER — OUTPATIENT (OUTPATIENT)
Dept: OUTPATIENT SERVICES | Facility: HOSPITAL | Age: 68
LOS: 1 days | End: 2023-06-15
Payer: MEDICARE

## 2023-06-15 DIAGNOSIS — Z90.49 ACQUIRED ABSENCE OF OTHER SPECIFIED PARTS OF DIGESTIVE TRACT: Chronic | ICD-10-CM

## 2023-06-15 DIAGNOSIS — C34.01 MALIGNANT NEOPLASM OF RIGHT MAIN BRONCHUS: ICD-10-CM

## 2023-06-15 PROCEDURE — 78815 PET IMAGE W/CT SKULL-THIGH: CPT

## 2023-06-15 PROCEDURE — 78815 PET IMAGE W/CT SKULL-THIGH: CPT | Mod: 26,PS,MH

## 2023-06-15 PROCEDURE — A9552: CPT

## 2023-06-16 ENCOUNTER — NON-APPOINTMENT (OUTPATIENT)
Age: 68
End: 2023-06-16

## 2023-06-19 ENCOUNTER — OUTPATIENT (OUTPATIENT)
Dept: OUTPATIENT SERVICES | Facility: HOSPITAL | Age: 68
LOS: 1 days | Discharge: HOME | End: 2023-06-19

## 2023-06-19 DIAGNOSIS — C34.90 MALIGNANT NEOPLASM OF UNSPECIFIED PART OF UNSPECIFIED BRONCHUS OR LUNG: ICD-10-CM

## 2023-06-19 DIAGNOSIS — Z90.49 ACQUIRED ABSENCE OF OTHER SPECIFIED PARTS OF DIGESTIVE TRACT: Chronic | ICD-10-CM

## 2023-06-27 ENCOUNTER — APPOINTMENT (OUTPATIENT)
Dept: HEMATOLOGY ONCOLOGY | Facility: CLINIC | Age: 68
End: 2023-06-27
Payer: MEDICARE

## 2023-06-27 ENCOUNTER — RESULT REVIEW (OUTPATIENT)
Age: 68
End: 2023-06-27

## 2023-06-27 ENCOUNTER — APPOINTMENT (OUTPATIENT)
Dept: INFUSION THERAPY | Facility: HOSPITAL | Age: 68
End: 2023-06-27

## 2023-06-27 VITALS
HEART RATE: 100 BPM | TEMPERATURE: 97 F | WEIGHT: 156.53 LBS | BODY MASS INDEX: 27.73 KG/M2 | DIASTOLIC BLOOD PRESSURE: 91 MMHG | OXYGEN SATURATION: 91 % | SYSTOLIC BLOOD PRESSURE: 148 MMHG | RESPIRATION RATE: 16 BRPM

## 2023-06-27 DIAGNOSIS — Z51.11 ENCOUNTER FOR ANTINEOPLASTIC CHEMOTHERAPY: ICD-10-CM

## 2023-06-27 LAB
ALBUMIN SERPL ELPH-MCNC: 4.7 G/DL — SIGNIFICANT CHANGE UP (ref 3.3–5)
ALP SERPL-CCNC: 44 U/L — SIGNIFICANT CHANGE UP (ref 40–120)
ALT FLD-CCNC: 9 U/L — LOW (ref 10–45)
ANION GAP SERPL CALC-SCNC: 12 MMOL/L — SIGNIFICANT CHANGE UP (ref 5–17)
AST SERPL-CCNC: 18 U/L — SIGNIFICANT CHANGE UP (ref 10–40)
BASOPHILS # BLD AUTO: 0.06 K/UL — SIGNIFICANT CHANGE UP (ref 0–0.2)
BASOPHILS NFR BLD AUTO: 0.8 % — SIGNIFICANT CHANGE UP (ref 0–2)
BILIRUB SERPL-MCNC: 0.5 MG/DL — SIGNIFICANT CHANGE UP (ref 0.2–1.2)
BUN SERPL-MCNC: 31 MG/DL — HIGH (ref 7–23)
CALCIUM SERPL-MCNC: 9.9 MG/DL — SIGNIFICANT CHANGE UP (ref 8.4–10.5)
CHLORIDE SERPL-SCNC: 100 MMOL/L — SIGNIFICANT CHANGE UP (ref 96–108)
CO2 SERPL-SCNC: 25 MMOL/L — SIGNIFICANT CHANGE UP (ref 22–31)
CREAT SERPL-MCNC: 1.4 MG/DL — HIGH (ref 0.5–1.3)
EGFR: 42 ML/MIN/1.73M2 — LOW
EOSINOPHIL # BLD AUTO: 0.09 K/UL — SIGNIFICANT CHANGE UP (ref 0–0.5)
EOSINOPHIL NFR BLD AUTO: 1.1 % — SIGNIFICANT CHANGE UP (ref 0–6)
GLUCOSE SERPL-MCNC: 82 MG/DL — SIGNIFICANT CHANGE UP (ref 70–99)
HCT VFR BLD CALC: 40.9 % — SIGNIFICANT CHANGE UP (ref 34.5–45)
HGB BLD-MCNC: 13.3 G/DL — SIGNIFICANT CHANGE UP (ref 11.5–15.5)
IMM GRANULOCYTES NFR BLD AUTO: 0.4 % — SIGNIFICANT CHANGE UP (ref 0–0.9)
LYMPHOCYTES # BLD AUTO: 2.41 K/UL — SIGNIFICANT CHANGE UP (ref 1–3.3)
LYMPHOCYTES # BLD AUTO: 30.1 % — SIGNIFICANT CHANGE UP (ref 13–44)
MCHC RBC-ENTMCNC: 30.9 PG — SIGNIFICANT CHANGE UP (ref 27–34)
MCHC RBC-ENTMCNC: 32.5 G/DL — SIGNIFICANT CHANGE UP (ref 32–36)
MCV RBC AUTO: 95.1 FL — SIGNIFICANT CHANGE UP (ref 80–100)
MONOCYTES # BLD AUTO: 1.12 K/UL — HIGH (ref 0–0.9)
MONOCYTES NFR BLD AUTO: 14 % — SIGNIFICANT CHANGE UP (ref 2–14)
NEUTROPHILS # BLD AUTO: 4.29 K/UL — SIGNIFICANT CHANGE UP (ref 1.8–7.4)
NEUTROPHILS NFR BLD AUTO: 53.6 % — SIGNIFICANT CHANGE UP (ref 43–77)
NRBC # BLD: 0 /100 WBCS — SIGNIFICANT CHANGE UP (ref 0–0)
PLATELET # BLD AUTO: 266 K/UL — SIGNIFICANT CHANGE UP (ref 150–400)
POTASSIUM SERPL-MCNC: 5.3 MMOL/L — SIGNIFICANT CHANGE UP (ref 3.5–5.3)
POTASSIUM SERPL-SCNC: 5.3 MMOL/L — SIGNIFICANT CHANGE UP (ref 3.5–5.3)
PROT SERPL-MCNC: 7.7 G/DL — SIGNIFICANT CHANGE UP (ref 6–8.3)
RBC # BLD: 4.3 M/UL — SIGNIFICANT CHANGE UP (ref 3.8–5.2)
RBC # FLD: 14.2 % — SIGNIFICANT CHANGE UP (ref 10.3–14.5)
SODIUM SERPL-SCNC: 137 MMOL/L — SIGNIFICANT CHANGE UP (ref 135–145)
T4 FREE SERPL-MCNC: 0.8 NG/DL — LOW (ref 0.9–1.8)
TSH SERPL-MCNC: 1.6 UIU/ML — SIGNIFICANT CHANGE UP (ref 0.27–4.2)
WBC # BLD: 8 K/UL — SIGNIFICANT CHANGE UP (ref 3.8–10.5)
WBC # FLD AUTO: 8 K/UL — SIGNIFICANT CHANGE UP (ref 3.8–10.5)

## 2023-06-27 PROCEDURE — 99214 OFFICE O/P EST MOD 30 MIN: CPT

## 2023-06-29 ENCOUNTER — NON-APPOINTMENT (OUTPATIENT)
Age: 68
End: 2023-06-29

## 2023-07-04 NOTE — PHYSICAL EXAM
[Fully active, able to carry on all pre-disease performance without restriction] : Status 0 - Fully active, able to carry on all pre-disease performance without restriction [Normal] : affect appropriate [de-identified] : No icterus [de-identified] : MMM O/P Clear [de-identified] : supple No LAD [de-identified] : S1 S2 [de-identified] : Clear [de-identified] : No edema [de-identified] : No spine/CVA tenderness [de-identified] : scattered skin darkening

## 2023-07-04 NOTE — RESULTS/DATA
[FreeTextEntry1] : – CT chest 7/7/2022: Large mediastinal mass measuring up to 7.0 cm extending into the right hilum and RUL parenchyma.  Differential considerations include a primary lung malignancy with mediastinal invasion versus primary mediastinal mass with extension into the lung parenchyma.  Mass narrows and encases the lobar right superior pulmonary artery and segmental branches and SVC.  There is abutment of the aortic arch branches by the mass.  Multiple bilateral pulmonary nodules measuring up to 1.4 cm.  \par -CT A/P 8/16: no evidence of metastatic disease in the abdomen or pelvis\par \par -CT C/A/P 9/26/22:  Comparison is made to outside CT chest July 7, 2022:  \par Favorable response to treatment as evidenced by significant interval decrease in size of right upper lobe pulmonary nodules, and interval decrease in size of multiple mediastinal lymph nodes. Stable 11.3 cm partially-calcified left adnexal mass. Pelvic MRI can be considered to exclude left adnexal pathology if clinically warranted.\par \par – MRI brain 10/31/2022: Negative for metastatic disease.  Probable pituitary microadenoma on the right.\par \par -CT C/A/P 1/2/23:  Since 9/26/2022:  Further decrease in size of right upper lobe nodules, representing known lung cancer.  New ill-defined right lower lobe nodule and slightly larger nodules abutting the left oblique fissure, of uncertain significance. 3 month follow-up is recommended.  Unchanged left adnexal mass.\par \par -CT C/A/P 3/31/23:  Few bilateral subcentimeter pulmonary nodules are unchanged since January 2, 2023.  Unchanged left adnexal mass.\par \par Images Reviewed/Interpreted:\par \par - PET/CT 6/15/23:  \par 1. Indeterminate FDG avid lesion in the region of the sella. The differential diagnosis includes hypophysitis, metastasis, and pituitary macroadenoma. Recommend laboratory and MRI evaluation.\par 2. No other FDG avid findings that would suggest residual/recurrent disease.\par \par

## 2023-07-04 NOTE — HISTORY OF PRESENT ILLNESS
[Disease: _____________________] : Disease: [unfilled] [T: ___] : T[unfilled] [N: ___] : N[unfilled] [M: ___] : M[unfilled] [AJCC Stage: ____] : AJCC Stage: [unfilled] [de-identified] : 66F with extensive smoking history, who quit in July 2022, noted weight loss of about 25 pounds over the past 7-8 months.  She does report a history of hyperthyroidism and attributed the weight loss to that at first.  In late May 2022, her family noted neck swelling.  She saw her PCP and was sent for a thyroid ultrasound which was unremarkable per the patient.  Due to the weight loss, she was sent for a CXR that was abnormal.  This led to a CT chest performed in early July 2022 revealing a large right hilar/mediastinal mass measuring up to 7 cm with bilateral pulmonary nodules.  She was referred to thoracic surgery.  She underwent EBUS/bronchoscopy in late July 2022 with biopsy of the RUL and pretracheal lymph node revealing small cell carcinoma.  Initial staging CT revealed an 11cm partially calcified left adnexal mass felt to represent an exophytic fibroid.  She was felt to have extensive stage disease based on the lung nodules felt to represent metastases.  Began first line Carbo/Etoposide/Atezolizumab in August 2022; achieved [place].  Completed  4 cycles of chemo + immunotherapy in Oct 2022 followed by maintenance Atezolizumab as of Nov 2022. \par \par  [de-identified] : – Lung, RUL biopsy and pretracheal EBUS guided FNA 7/29/2022: Positive for malignant cells.  Small cell carcinoma. [de-identified] : Completed  4 cycles of chemo + immunotherapy in Oct 2022 followed by maintenance Atezolizumab as of Nov 2022.  She is seen today prior to continued treatment with maintenance Atezolizumab. \par She presents today with her son, who is a PA.  \par \par No F/C/N/V/D.  No new symptoms of concern.  \par She went for her brain MRI at an outside open facility on 6/23; results unavailable for review at the time of this visit.\par \par Restaging PET/CT this month with nice sustained deep systemic response.\par There is an indeterminate FDG avid lesion in the sella which could be secondary to a pituitary adenoma.\par Discussed rescheduling her endocrinology follow-up appointment as it appears there was an appointment earlier this month listed as no–show.

## 2023-07-04 NOTE — ASSESSMENT
[FreeTextEntry1] : Extensive stage small cell lung cancer.  Began first line chemo + immunotherapy with Carboplatin/Etoposide/Atezolizumab in August 2022; achieved [place].  Completed  4 cycles of chemo + immunotherapy in Oct 2022 followed by maintenance Atezolizumab as of Nov 2022.\par Restaging PET/CT June 2023 with overall sustained systemic response.  There are findings concerning for possible pituitary adenoma that was noted on a prior outside Brain MRI.  \par Recommend:\par –Continue with Atezolizumab maintenance for as long as it benefits the patient\par -See Radiation Oncology for consideration of possible consolidative thoracic RT, however their evaluation may be unclear for what to actually radiate given the deep response.  Request for appt sent.  Rad-Onc to also evaluate role of PCI, for which the role is not clear given stage of disease.  \par –Repeat labs today\par –F/U with Endocrine for evaluation and work-up of possible pituitary adenoma.  Also follows for management of hypercalcemia and hyperthyroidism.  Monitor TFT’s.  \par –Obtain outside MRI Brain results from 6/23/23.   \par –She appears to have a left adnexal fibroid mass unrelated to her cancer diagnosis\par -Dermatitis:  possibly related to immunotherapy.  Utilizing topical steroid.  Can consider Derm evaluation.  \par –F/U prior to next cycle or sooner should problems arise.

## 2023-07-14 ENCOUNTER — APPOINTMENT (OUTPATIENT)
Dept: RADIATION ONCOLOGY | Facility: CLINIC | Age: 68
End: 2023-07-14
Payer: MEDICARE

## 2023-07-14 VITALS
RESPIRATION RATE: 18 BRPM | DIASTOLIC BLOOD PRESSURE: 81 MMHG | BODY MASS INDEX: 28.01 KG/M2 | HEART RATE: 92 BPM | OXYGEN SATURATION: 95 % | SYSTOLIC BLOOD PRESSURE: 117 MMHG | WEIGHT: 158.07 LBS | TEMPERATURE: 97.34 F | HEIGHT: 63 IN

## 2023-07-14 DIAGNOSIS — Z80.42 FAMILY HISTORY OF MALIGNANT NEOPLASM OF PROSTATE: ICD-10-CM

## 2023-07-14 PROCEDURE — 99205 OFFICE O/P NEW HI 60 MIN: CPT | Mod: GC,25

## 2023-07-14 NOTE — PHYSICAL EXAM
[Skin Color & Pigmentation] : normal skin color and pigmentation [] : no rash [Normal] : no focal deficits

## 2023-07-14 NOTE — REVIEW OF SYSTEMS
[Negative] : Allergic/Immunologic [Dyspepsia: Grade 0] : Dyspepsia: Grade 0 [Dysphagia: Grade 0] : Dysphagia: Grade 0 [Esophagitis: Grade 0] : Esophagitis: Grade 0 [Fatigue: Grade 0] : Fatigue: Grade 0 [Dizziness: Grade 0] : Dizziness: Grade 0  [Headache: Grade 0] : Headache: Grade 0 [Cough: Grade 0] : Cough: Grade 0 [Dyspnea: Grade 0] : Dyspnea: Grade 0 [Hoarseness: Grade 1 - Mild or intermittent voice change; fully understandable; self-resolves] : Hoarseness: Grade 1 - Mild or intermittent voice change; fully understandable; self-resolves [Cognitive Disturbance: Grade 0] : Cognitive Disturbance: Grade 0 [Concentration Impairment: Grade 0] : Concentration Impairment: Grade 0 [FreeTextEntry4] : baseline

## 2023-07-14 NOTE — HISTORY OF PRESENT ILLNESS
[FreeTextEntry1] : Ms. Whitmore is a 67 year old female, former smoker (extensive history; quit in July 2022) with [id number] Extensive Stage Small cell lung cancer initially diagnosed in July 2022 s/p 4 cycles of chemo + immunotherapy completed October 2022, and currently continuing on atezolizumab maintenance therapy, presenting to discuss radiation therapy recommendations. PMHx notable for hyperthyroidism.\par \par The course of her illness began with appreciation of 25 lb weight loss, which she had initially attributed to her hyperthyroidism. However, in late May 2022, her family appreciated neck swelling, prompting her to present to her PCP, who ordered thyroid ultrasound that was reportedly unremarkable. However, she was sent for a chest x-ray on account of her weight loss. This was abnormal, prompting a CT chest in early July 2022, that was demonstrative of a large right hilar/mediastinal mass measuring up to 7 cm with bilateral pulmonary nodules. She was then referred to thoracic surgery, with bronch/ebus done in July 2022, with biopsy of RUL and pretracheal lymph nod erevealing small cell carcinoma. Initially staging CT was also demonstrative of a 11 cm partially calcified left adnexal mass felt to represent an exophytic fibroid. Ultimately, she was staged as extensive stage d/t lung nodules felt to represent metastasis and hence, was initiated on first line Carbo/Etoposide/Atezolizumab in August 2022, with resulting partial response. 4 cycles were completed October 2022, followed by Atezolizumab maintenance starting Nov 2022. \par \par PET/CT 6/15/23 was negative for any findings suggestive of residual or recurrent disease. Noted was an indeterminate FDG avid lesion in the sella, for which MRI was recommended. MRI brain 6/19/2023, per outside report has a pituitary gland lesion possibly c/w adenoma. She will follow up with her existing endocrinologist  Dr. Miller 8/15/23 for further evaluation of this\par \par 07/14/23: She presents today to discuss radiation therapy recommendations. Patient reports feeling well overall without any respiratory symptoms, namely dyspnea, dyspnea on exertion, cough, chest pain. Denies any neurological symptoms e.g. headache, numbness or tingling, nausea or vomiting. Denies any other systemic symptoms.

## 2023-07-14 NOTE — VITALS
[Maximal Pain Intensity: 0/10] : 0/10 [Least Pain Intensity: 0/10] : 0/10 [80: Normal activity with effort; some signs or symptoms of disease.] : 80: Normal activity with effort; some signs or symptoms of disease.  [Date: ____________] : Patient's last distress assessment performed on [unfilled]. [0 - No Distress] : Distress Level: 0 [Patient given social work contact information and resource sheet] : Patient was given social work contact information and resource sheet

## 2023-07-14 NOTE — SOCIAL HISTORY
[Date (Year) Stopped: _____] : Date (Year) Stopped: [unfilled]  [de-identified] : appx 20 year smoking history

## 2023-07-14 NOTE — OB/GYN HISTORY
[Definite:  ___ (Date)] : the last menstrual period was [unfilled] [Currently In Menopause] : currently in menopause [Menopause Age: ____] : patient was [unfilled] years old at menopause [Experiencing Menopausal Sxs] : not experiencing menopausal symptoms

## 2023-07-25 ENCOUNTER — APPOINTMENT (OUTPATIENT)
Dept: INFUSION THERAPY | Facility: HOSPITAL | Age: 68
End: 2023-07-25

## 2023-07-25 ENCOUNTER — APPOINTMENT (OUTPATIENT)
Dept: HEMATOLOGY ONCOLOGY | Facility: CLINIC | Age: 68
End: 2023-07-25
Payer: MEDICARE

## 2023-07-25 ENCOUNTER — RESULT REVIEW (OUTPATIENT)
Age: 68
End: 2023-07-25

## 2023-07-25 ENCOUNTER — NON-APPOINTMENT (OUTPATIENT)
Age: 68
End: 2023-07-25

## 2023-07-25 VITALS
SYSTOLIC BLOOD PRESSURE: 114 MMHG | BODY MASS INDEX: 27.84 KG/M2 | OXYGEN SATURATION: 98 % | HEART RATE: 108 BPM | TEMPERATURE: 96.6 F | WEIGHT: 157.19 LBS | DIASTOLIC BLOOD PRESSURE: 76 MMHG | RESPIRATION RATE: 16 BRPM

## 2023-07-25 DIAGNOSIS — Z51.12 ENCOUNTER FOR ANTINEOPLASTIC IMMUNOTHERAPY: ICD-10-CM

## 2023-07-25 LAB
ALBUMIN SERPL ELPH-MCNC: 4.2 G/DL — SIGNIFICANT CHANGE UP (ref 3.3–5)
ALP SERPL-CCNC: 42 U/L — SIGNIFICANT CHANGE UP (ref 40–120)
ALT FLD-CCNC: 7 U/L — LOW (ref 10–45)
ANION GAP SERPL CALC-SCNC: 11 MMOL/L — SIGNIFICANT CHANGE UP (ref 5–17)
AST SERPL-CCNC: 27 U/L — SIGNIFICANT CHANGE UP (ref 10–40)
BASOPHILS # BLD AUTO: 0.08 K/UL — SIGNIFICANT CHANGE UP (ref 0–0.2)
BASOPHILS NFR BLD AUTO: 1.1 % — SIGNIFICANT CHANGE UP (ref 0–2)
BILIRUB SERPL-MCNC: 0.5 MG/DL — SIGNIFICANT CHANGE UP (ref 0.2–1.2)
BUN SERPL-MCNC: 20 MG/DL — SIGNIFICANT CHANGE UP (ref 7–23)
CALCIUM SERPL-MCNC: 9.7 MG/DL — SIGNIFICANT CHANGE UP (ref 8.4–10.5)
CHLORIDE SERPL-SCNC: 101 MMOL/L — SIGNIFICANT CHANGE UP (ref 96–108)
CO2 SERPL-SCNC: 25 MMOL/L — SIGNIFICANT CHANGE UP (ref 22–31)
CREAT SERPL-MCNC: 1.48 MG/DL — HIGH (ref 0.5–1.3)
EGFR: 39 ML/MIN/1.73M2 — LOW
EOSINOPHIL # BLD AUTO: 0.19 K/UL — SIGNIFICANT CHANGE UP (ref 0–0.5)
EOSINOPHIL NFR BLD AUTO: 2.7 % — SIGNIFICANT CHANGE UP (ref 0–6)
GLUCOSE SERPL-MCNC: 123 MG/DL — HIGH (ref 70–99)
HCT VFR BLD CALC: 39.4 % — SIGNIFICANT CHANGE UP (ref 34.5–45)
HGB BLD-MCNC: 13.2 G/DL — SIGNIFICANT CHANGE UP (ref 11.5–15.5)
IMM GRANULOCYTES NFR BLD AUTO: 0.3 % — SIGNIFICANT CHANGE UP (ref 0–0.9)
LYMPHOCYTES # BLD AUTO: 2.74 K/UL — SIGNIFICANT CHANGE UP (ref 1–3.3)
LYMPHOCYTES # BLD AUTO: 38.3 % — SIGNIFICANT CHANGE UP (ref 13–44)
MCHC RBC-ENTMCNC: 31.4 PG — SIGNIFICANT CHANGE UP (ref 27–34)
MCHC RBC-ENTMCNC: 33.5 G/DL — SIGNIFICANT CHANGE UP (ref 32–36)
MCV RBC AUTO: 93.6 FL — SIGNIFICANT CHANGE UP (ref 80–100)
MONOCYTES # BLD AUTO: 0.91 K/UL — HIGH (ref 0–0.9)
MONOCYTES NFR BLD AUTO: 12.7 % — SIGNIFICANT CHANGE UP (ref 2–14)
NEUTROPHILS # BLD AUTO: 3.22 K/UL — SIGNIFICANT CHANGE UP (ref 1.8–7.4)
NEUTROPHILS NFR BLD AUTO: 44.9 % — SIGNIFICANT CHANGE UP (ref 43–77)
NRBC # BLD: 0 /100 WBCS — SIGNIFICANT CHANGE UP (ref 0–0)
PLATELET # BLD AUTO: 261 K/UL — SIGNIFICANT CHANGE UP (ref 150–400)
POTASSIUM SERPL-MCNC: 4.4 MMOL/L — SIGNIFICANT CHANGE UP (ref 3.5–5.3)
POTASSIUM SERPL-SCNC: 4.4 MMOL/L — SIGNIFICANT CHANGE UP (ref 3.5–5.3)
PROT SERPL-MCNC: 7.7 G/DL — SIGNIFICANT CHANGE UP (ref 6–8.3)
RBC # BLD: 4.21 M/UL — SIGNIFICANT CHANGE UP (ref 3.8–5.2)
RBC # FLD: 13.2 % — SIGNIFICANT CHANGE UP (ref 10.3–14.5)
SODIUM SERPL-SCNC: 138 MMOL/L — SIGNIFICANT CHANGE UP (ref 135–145)
WBC # BLD: 7.16 K/UL — SIGNIFICANT CHANGE UP (ref 3.8–10.5)
WBC # FLD AUTO: 7.16 K/UL — SIGNIFICANT CHANGE UP (ref 3.8–10.5)

## 2023-07-25 PROCEDURE — 99214 OFFICE O/P EST MOD 30 MIN: CPT

## 2023-07-28 PROBLEM — Z51.12 ENCOUNTER FOR ANTINEOPLASTIC IMMUNOTHERAPY: Status: ACTIVE | Noted: 2023-01-03

## 2023-07-28 NOTE — RESULTS/DATA
[FreeTextEntry1] : – CT chest 7/7/2022: Large mediastinal mass measuring up to 7.0 cm extending into the right hilum and RUL parenchyma.  Differential considerations include a primary lung malignancy with mediastinal invasion versus primary mediastinal mass with extension into the lung parenchyma.  Mass narrows and encases the lobar right superior pulmonary artery and segmental branches and SVC.  There is abutment of the aortic arch branches by the mass.  Multiple bilateral pulmonary nodules measuring up to 1.4 cm.  \par -CT A/P 8/16: no evidence of metastatic disease in the abdomen or pelvis\par \par -CT C/A/P 9/26/22:  Comparison is made to outside CT chest July 7, 2022:  \par Favorable response to treatment as evidenced by significant interval decrease in size of right upper lobe pulmonary nodules, and interval decrease in size of multiple mediastinal lymph nodes. Stable 11.3 cm partially-calcified left adnexal mass. Pelvic MRI can be considered to exclude left adnexal pathology if clinically warranted.\par \par – MRI brain 10/31/2022: Negative for metastatic disease.  Probable pituitary microadenoma on the right.\par \par -CT C/A/P 1/2/23:  Since 9/26/2022:  Further decrease in size of right upper lobe nodules, representing known lung cancer.  New ill-defined right lower lobe nodule and slightly larger nodules abutting the left oblique fissure, of uncertain significance. 3 month follow-up is recommended.  Unchanged left adnexal mass.\par \par -CT C/A/P 3/31/23:  Few bilateral subcentimeter pulmonary nodules are unchanged since January 2, 2023.  Unchanged left adnexal mass.\par \par - PET/CT 6/15/23:  \par 1. Indeterminate FDG avid lesion in the region of the sella. The differential diagnosis includes hypophysitis, metastasis, and pituitary macroadenoma. Recommend laboratory and MRI evaluation.\par 2. No other FDG avid findings that would suggest residual/recurrent disease.\par \par – MRI brain 6/19/2023: No evidence of intracranial metastatic disease.  Minimal small vessel ischemic disease.  Pituitary gland lesion probably representing an adenoma.\par \par

## 2023-07-28 NOTE — HISTORY OF PRESENT ILLNESS
[Disease: _____________________] : Disease: [unfilled] [T: ___] : T[unfilled] [N: ___] : N[unfilled] [M: ___] : M[unfilled] [AJCC Stage: ____] : AJCC Stage: [unfilled] [de-identified] : 66F with extensive smoking history, who quit in July 2022, noted weight loss of about 25 pounds over the past 7-8 months.  She does report a history of hyperthyroidism and attributed the weight loss to that at first.  In late May 2022, her family noted neck swelling.  She saw her PCP and was sent for a thyroid ultrasound which was unremarkable per the patient.  Due to the weight loss, she was sent for a CXR that was abnormal.  This led to a CT chest performed in early July 2022 revealing a large right hilar/mediastinal mass measuring up to 7 cm with bilateral pulmonary nodules.  She was referred to thoracic surgery.  She underwent EBUS/bronchoscopy in late July 2022 with biopsy of the RUL and pretracheal lymph node revealing small cell carcinoma.  Initial staging CT revealed an 11cm partially calcified left adnexal mass felt to represent an exophytic fibroid.  She was felt to have extensive stage disease based on the lung nodules felt to represent metastases.  Began first line Carbo/Etoposide/Atezolizumab in August 2022; achieved [place].  Completed  4 cycles of chemo + immunotherapy in Oct 2022 followed by maintenance Atezolizumab as of Nov 2022. \par \par  [de-identified] : – Lung, RUL biopsy and pretracheal EBUS guided FNA 7/29/2022: Positive for malignant cells.  Small cell carcinoma. [de-identified] : Completed  4 cycles of chemo + immunotherapy in Oct 2022 followed by maintenance Atezolizumab as of Nov 2022.  She is seen today prior to continued treatment with maintenance Atezolizumab. \par She reports tolerating therapy.  Denies F/C/N/V/D.\par Went for open brain MRI in June 2023 that was negative for metastatic disease.  There is note of a pituitary gland lesion suspected to represent an adenoma.\par She still radiation oncology for evaluation of consolidative thoracic RT which was deferred due to her dramatic response.  Patient also preferred to undergo surveillance brain MRIs rather than receive PCI.

## 2023-07-28 NOTE — ASSESSMENT
[FreeTextEntry1] : Extensive stage small cell lung cancer.  Began first line chemo + immunotherapy with Carboplatin/Etoposide/Atezolizumab in August 2022; achieved [place].  Completed  4 cycles of chemo + immunotherapy in Oct 2022 followed by maintenance Atezolizumab as of Nov 2022.\par Restaging PET/CT June 2023 with overall sustained systemic response.  \par There are findings concerning for possible pituitary adenoma noted on surveillance outside Brain MRI.  \par Recommend:\par –Continue with Atezolizumab maintenance for as long as it benefits the patient\par -Saw Rad-Onc who deferred on Thoracic RT given dramatic response.  \par -Brain MRI surveillance as per Rad-Onc.  \par –Repeat labs today.  Monitor TFT’s given h/o Hyperthyroidism.  Patient has been on methimazole and this has been managed by her PCP.\par –F/U with Endocrine for evaluation and work-up of possible pituitary adenoma.  Discussed having endocrine evaluate her hypothyroidism and methimazole medical management as well.\par –She appears to have a left adnexal fibroid mass unrelated to her cancer diagnosis\par -Dermatitis:  possibly related to immunotherapy.  Utilizing topical steroid.  Can consider Derm evaluation.  \par –F/U prior to next cycle or sooner should problems arise.  Can plan on obtaining her next restaging body scan in OCTOBER

## 2023-07-28 NOTE — PHYSICAL EXAM
[Fully active, able to carry on all pre-disease performance without restriction] : Status 0 - Fully active, able to carry on all pre-disease performance without restriction [Normal] : affect appropriate [de-identified] : No icterus [de-identified] : MMM O/P Clear [de-identified] : supple No LAD [de-identified] : Clear [de-identified] : S1 S2 [de-identified] : No edema [de-identified] : No spine/CVA tenderness [de-identified] : scattered skin darkening

## 2023-08-05 ENCOUNTER — NON-APPOINTMENT (OUTPATIENT)
Age: 68
End: 2023-08-05

## 2023-08-06 ENCOUNTER — INPATIENT (INPATIENT)
Facility: HOSPITAL | Age: 68
LOS: 5 days | Discharge: HOME HEALTH | End: 2023-08-12
Attending: STUDENT IN AN ORGANIZED HEALTH CARE EDUCATION/TRAINING PROGRAM | Admitting: STUDENT IN AN ORGANIZED HEALTH CARE EDUCATION/TRAINING PROGRAM
Payer: MEDICARE

## 2023-08-06 VITALS
DIASTOLIC BLOOD PRESSURE: 54 MMHG | OXYGEN SATURATION: 100 % | RESPIRATION RATE: 17 BRPM | TEMPERATURE: 98 F | SYSTOLIC BLOOD PRESSURE: 81 MMHG | HEART RATE: 102 BPM

## 2023-08-06 DIAGNOSIS — Z90.49 ACQUIRED ABSENCE OF OTHER SPECIFIED PARTS OF DIGESTIVE TRACT: Chronic | ICD-10-CM

## 2023-08-06 LAB
ALBUMIN SERPL ELPH-MCNC: 3.8 G/DL — SIGNIFICANT CHANGE UP (ref 3.3–5)
ALP SERPL-CCNC: 39 U/L — LOW (ref 40–120)
ALT FLD-CCNC: 11 U/L — SIGNIFICANT CHANGE UP (ref 4–33)
ANION GAP SERPL CALC-SCNC: 13 MMOL/L — SIGNIFICANT CHANGE UP (ref 7–14)
ANION GAP SERPL CALC-SCNC: 15 MMOL/L — HIGH (ref 7–14)
APPEARANCE UR: CLEAR — SIGNIFICANT CHANGE UP
APTT BLD: 30.6 SEC — SIGNIFICANT CHANGE UP (ref 24.5–35.6)
AST SERPL-CCNC: 24 U/L — SIGNIFICANT CHANGE UP (ref 4–32)
B PERT DNA SPEC QL NAA+PROBE: SIGNIFICANT CHANGE UP
B PERT+PARAPERT DNA PNL SPEC NAA+PROBE: SIGNIFICANT CHANGE UP
BACTERIA # UR AUTO: ABNORMAL /HPF
BASE EXCESS BLDV CALC-SCNC: -6.1 MMOL/L — LOW (ref -2–3)
BASE EXCESS BLDV CALC-SCNC: -9.2 MMOL/L — LOW (ref -2–3)
BASOPHILS # BLD AUTO: 0.12 K/UL — SIGNIFICANT CHANGE UP (ref 0–0.2)
BASOPHILS NFR BLD AUTO: 1.1 % — SIGNIFICANT CHANGE UP (ref 0–2)
BILIRUB SERPL-MCNC: 0.4 MG/DL — SIGNIFICANT CHANGE UP (ref 0.2–1.2)
BILIRUB UR-MCNC: NEGATIVE — SIGNIFICANT CHANGE UP
BLOOD GAS VENOUS COMPREHENSIVE RESULT: SIGNIFICANT CHANGE UP
BLOOD GAS VENOUS COMPREHENSIVE RESULT: SIGNIFICANT CHANGE UP
BORDETELLA PARAPERTUSSIS (RAPRVP): SIGNIFICANT CHANGE UP
BUN SERPL-MCNC: 22 MG/DL — SIGNIFICANT CHANGE UP (ref 7–23)
BUN SERPL-MCNC: 24 MG/DL — HIGH (ref 7–23)
C PNEUM DNA SPEC QL NAA+PROBE: SIGNIFICANT CHANGE UP
CALCIUM SERPL-MCNC: 10.1 MG/DL — SIGNIFICANT CHANGE UP (ref 8.4–10.5)
CALCIUM SERPL-MCNC: 8.7 MG/DL — SIGNIFICANT CHANGE UP (ref 8.4–10.5)
CHLORIDE BLDV-SCNC: 100 MMOL/L — SIGNIFICANT CHANGE UP (ref 96–108)
CHLORIDE BLDV-SCNC: 99 MMOL/L — SIGNIFICANT CHANGE UP (ref 96–108)
CHLORIDE SERPL-SCNC: 101 MMOL/L — SIGNIFICANT CHANGE UP (ref 98–107)
CHLORIDE SERPL-SCNC: 96 MMOL/L — LOW (ref 98–107)
CO2 BLDV-SCNC: 20.6 MMOL/L — LOW (ref 22–26)
CO2 BLDV-SCNC: 22.1 MMOL/L — SIGNIFICANT CHANGE UP (ref 22–26)
CO2 SERPL-SCNC: 19 MMOL/L — LOW (ref 22–31)
CO2 SERPL-SCNC: 23 MMOL/L — SIGNIFICANT CHANGE UP (ref 22–31)
COLOR SPEC: YELLOW — SIGNIFICANT CHANGE UP
CREAT SERPL-MCNC: 1.76 MG/DL — HIGH (ref 0.5–1.3)
CREAT SERPL-MCNC: 2.16 MG/DL — HIGH (ref 0.5–1.3)
DIFF PNL FLD: NEGATIVE — SIGNIFICANT CHANGE UP
EGFR: 25 ML/MIN/1.73M2 — LOW
EGFR: 32 ML/MIN/1.73M2 — LOW
EOSINOPHIL # BLD AUTO: 0.28 K/UL — SIGNIFICANT CHANGE UP (ref 0–0.5)
EOSINOPHIL NFR BLD AUTO: 2.6 % — SIGNIFICANT CHANGE UP (ref 0–6)
EPI CELLS # UR: PRESENT
FLUAV SUBTYP SPEC NAA+PROBE: SIGNIFICANT CHANGE UP
FLUBV RNA SPEC QL NAA+PROBE: SIGNIFICANT CHANGE UP
GAS PNL BLDV: 125 MMOL/L — LOW (ref 136–145)
GAS PNL BLDV: 129 MMOL/L — LOW (ref 136–145)
GAS PNL BLDV: SIGNIFICANT CHANGE UP
GAS PNL BLDV: SIGNIFICANT CHANGE UP
GLUCOSE BLDV-MCNC: 109 MG/DL — HIGH (ref 70–99)
GLUCOSE BLDV-MCNC: 95 MG/DL — SIGNIFICANT CHANGE UP (ref 70–99)
GLUCOSE SERPL-MCNC: 105 MG/DL — HIGH (ref 70–99)
GLUCOSE SERPL-MCNC: 117 MG/DL — HIGH (ref 70–99)
GLUCOSE UR QL: NEGATIVE MG/DL — SIGNIFICANT CHANGE UP
HADV DNA SPEC QL NAA+PROBE: SIGNIFICANT CHANGE UP
HCO3 BLDV-SCNC: 19 MMOL/L — LOW (ref 22–29)
HCO3 BLDV-SCNC: 21 MMOL/L — LOW (ref 22–29)
HCOV 229E RNA SPEC QL NAA+PROBE: SIGNIFICANT CHANGE UP
HCOV HKU1 RNA SPEC QL NAA+PROBE: SIGNIFICANT CHANGE UP
HCOV NL63 RNA SPEC QL NAA+PROBE: SIGNIFICANT CHANGE UP
HCOV OC43 RNA SPEC QL NAA+PROBE: SIGNIFICANT CHANGE UP
HCT VFR BLD CALC: 40.1 % — SIGNIFICANT CHANGE UP (ref 34.5–45)
HCT VFR BLDA CALC: 38 % — SIGNIFICANT CHANGE UP (ref 34.5–46.5)
HCT VFR BLDA CALC: 38 % — SIGNIFICANT CHANGE UP (ref 34.5–46.5)
HGB BLD CALC-MCNC: 12.7 G/DL — SIGNIFICANT CHANGE UP (ref 11.7–16.1)
HGB BLD CALC-MCNC: 12.8 G/DL — SIGNIFICANT CHANGE UP (ref 11.7–16.1)
HGB BLD-MCNC: 13.5 G/DL — SIGNIFICANT CHANGE UP (ref 11.5–15.5)
HMPV RNA SPEC QL NAA+PROBE: SIGNIFICANT CHANGE UP
HPIV1 RNA SPEC QL NAA+PROBE: SIGNIFICANT CHANGE UP
HPIV2 RNA SPEC QL NAA+PROBE: SIGNIFICANT CHANGE UP
HPIV3 RNA SPEC QL NAA+PROBE: SIGNIFICANT CHANGE UP
HPIV4 RNA SPEC QL NAA+PROBE: SIGNIFICANT CHANGE UP
HYALINE CASTS # UR AUTO: SIGNIFICANT CHANGE UP
IANC: 4.74 K/UL — SIGNIFICANT CHANGE UP (ref 1.8–7.4)
IMM GRANULOCYTES NFR BLD AUTO: 0.4 % — SIGNIFICANT CHANGE UP (ref 0–0.9)
INR BLD: 1.12 RATIO — SIGNIFICANT CHANGE UP (ref 0.85–1.18)
KETONES UR-MCNC: NEGATIVE MG/DL — SIGNIFICANT CHANGE UP
LACTATE BLDV-MCNC: 1.4 MMOL/L — SIGNIFICANT CHANGE UP (ref 0.5–2)
LACTATE BLDV-MCNC: 2.9 MMOL/L — HIGH (ref 0.5–2)
LEUKOCYTE ESTERASE UR-ACNC: ABNORMAL
LYMPHOCYTES # BLD AUTO: 36.6 % — SIGNIFICANT CHANGE UP (ref 13–44)
LYMPHOCYTES # BLD AUTO: 4 K/UL — HIGH (ref 1–3.3)
M PNEUMO DNA SPEC QL NAA+PROBE: SIGNIFICANT CHANGE UP
MCHC RBC-ENTMCNC: 30.6 PG — SIGNIFICANT CHANGE UP (ref 27–34)
MCHC RBC-ENTMCNC: 33.7 GM/DL — SIGNIFICANT CHANGE UP (ref 32–36)
MCV RBC AUTO: 90.9 FL — SIGNIFICANT CHANGE UP (ref 80–100)
MONOCYTES # BLD AUTO: 1.74 K/UL — HIGH (ref 0–0.9)
MONOCYTES NFR BLD AUTO: 15.9 % — HIGH (ref 2–14)
NEUTROPHILS # BLD AUTO: 4.74 K/UL — SIGNIFICANT CHANGE UP (ref 1.8–7.4)
NEUTROPHILS NFR BLD AUTO: 43.4 % — SIGNIFICANT CHANGE UP (ref 43–77)
NITRITE UR-MCNC: NEGATIVE — SIGNIFICANT CHANGE UP
NRBC # BLD: 0 /100 WBCS — SIGNIFICANT CHANGE UP (ref 0–0)
NRBC # FLD: 0 K/UL — SIGNIFICANT CHANGE UP (ref 0–0)
PCO2 BLDV: 45 MMHG — SIGNIFICANT CHANGE UP (ref 39–52)
PCO2 BLDV: 50 MMHG — SIGNIFICANT CHANGE UP (ref 39–52)
PH BLDV: 7.19 — LOW (ref 7.32–7.43)
PH BLDV: 7.27 — LOW (ref 7.32–7.43)
PH UR: 5.5 — SIGNIFICANT CHANGE UP (ref 5–8)
PLATELET # BLD AUTO: 330 K/UL — SIGNIFICANT CHANGE UP (ref 150–400)
PO2 BLDV: 42 MMHG — SIGNIFICANT CHANGE UP (ref 25–45)
PO2 BLDV: 55 MMHG — HIGH (ref 25–45)
POTASSIUM BLDV-SCNC: 4.5 MMOL/L — SIGNIFICANT CHANGE UP (ref 3.5–5.1)
POTASSIUM BLDV-SCNC: 4.7 MMOL/L — SIGNIFICANT CHANGE UP (ref 3.5–5.1)
POTASSIUM SERPL-MCNC: 4.5 MMOL/L — SIGNIFICANT CHANGE UP (ref 3.5–5.3)
POTASSIUM SERPL-MCNC: 5 MMOL/L — SIGNIFICANT CHANGE UP (ref 3.5–5.3)
POTASSIUM SERPL-SCNC: 4.5 MMOL/L — SIGNIFICANT CHANGE UP (ref 3.5–5.3)
POTASSIUM SERPL-SCNC: 5 MMOL/L — SIGNIFICANT CHANGE UP (ref 3.5–5.3)
PROT SERPL-MCNC: 7.5 G/DL — SIGNIFICANT CHANGE UP (ref 6–8.3)
PROT UR-MCNC: NEGATIVE MG/DL — SIGNIFICANT CHANGE UP
PROTHROM AB SERPL-ACNC: 12.5 SEC — SIGNIFICANT CHANGE UP (ref 9.5–13)
RAPID RVP RESULT: SIGNIFICANT CHANGE UP
RBC # BLD: 4.41 M/UL — SIGNIFICANT CHANGE UP (ref 3.8–5.2)
RBC # FLD: 13.2 % — SIGNIFICANT CHANGE UP (ref 10.3–14.5)
RBC CASTS # UR COMP ASSIST: 1 /HPF — SIGNIFICANT CHANGE UP (ref 0–4)
RSV RNA SPEC QL NAA+PROBE: SIGNIFICANT CHANGE UP
RV+EV RNA SPEC QL NAA+PROBE: SIGNIFICANT CHANGE UP
SAO2 % BLDV: 68.5 % — SIGNIFICANT CHANGE UP (ref 67–88)
SAO2 % BLDV: 81.6 % — SIGNIFICANT CHANGE UP (ref 67–88)
SARS-COV-2 RNA SPEC QL NAA+PROBE: SIGNIFICANT CHANGE UP
SODIUM SERPL-SCNC: 133 MMOL/L — LOW (ref 135–145)
SODIUM SERPL-SCNC: 134 MMOL/L — LOW (ref 135–145)
SP GR SPEC: 1.01 — SIGNIFICANT CHANGE UP (ref 1–1.03)
UROBILINOGEN FLD QL: 0.2 MG/DL — SIGNIFICANT CHANGE UP (ref 0.2–1)
WBC # BLD: 10.92 K/UL — HIGH (ref 3.8–10.5)
WBC # FLD AUTO: 10.92 K/UL — HIGH (ref 3.8–10.5)
WBC UR QL: 6 /HPF — HIGH (ref 0–5)

## 2023-08-06 PROCEDURE — 71046 X-RAY EXAM CHEST 2 VIEWS: CPT | Mod: 26

## 2023-08-06 PROCEDURE — 99285 EMERGENCY DEPT VISIT HI MDM: CPT

## 2023-08-06 RX ORDER — SODIUM CHLORIDE 9 MG/ML
1000 INJECTION INTRAMUSCULAR; INTRAVENOUS; SUBCUTANEOUS ONCE
Refills: 0 | Status: DISCONTINUED | OUTPATIENT
Start: 2023-08-06 | End: 2023-08-06

## 2023-08-06 RX ORDER — CEFTRIAXONE 500 MG/1
1000 INJECTION, POWDER, FOR SOLUTION INTRAMUSCULAR; INTRAVENOUS ONCE
Refills: 0 | Status: DISCONTINUED | OUTPATIENT
Start: 2023-08-06 | End: 2023-08-06

## 2023-08-06 RX ORDER — PIPERACILLIN AND TAZOBACTAM 4; .5 G/20ML; G/20ML
3.38 INJECTION, POWDER, LYOPHILIZED, FOR SOLUTION INTRAVENOUS ONCE
Refills: 0 | Status: COMPLETED | OUTPATIENT
Start: 2023-08-06 | End: 2023-08-06

## 2023-08-06 RX ORDER — SODIUM CHLORIDE 9 MG/ML
1000 INJECTION, SOLUTION INTRAVENOUS ONCE
Refills: 0 | Status: COMPLETED | OUTPATIENT
Start: 2023-08-06 | End: 2023-08-06

## 2023-08-06 RX ORDER — ONDANSETRON 8 MG/1
4 TABLET, FILM COATED ORAL ONCE
Refills: 0 | Status: COMPLETED | OUTPATIENT
Start: 2023-08-06 | End: 2023-08-06

## 2023-08-06 RX ORDER — SODIUM CHLORIDE 9 MG/ML
2000 INJECTION INTRAMUSCULAR; INTRAVENOUS; SUBCUTANEOUS ONCE
Refills: 0 | Status: COMPLETED | OUTPATIENT
Start: 2023-08-06 | End: 2023-08-06

## 2023-08-06 RX ORDER — VANCOMYCIN HCL 1 G
1000 VIAL (EA) INTRAVENOUS ONCE
Refills: 0 | Status: COMPLETED | OUTPATIENT
Start: 2023-08-06 | End: 2023-08-06

## 2023-08-06 RX ADMIN — PIPERACILLIN AND TAZOBACTAM 200 GRAM(S): 4; .5 INJECTION, POWDER, LYOPHILIZED, FOR SOLUTION INTRAVENOUS at 22:46

## 2023-08-06 RX ADMIN — SODIUM CHLORIDE 2000 MILLILITER(S): 9 INJECTION INTRAMUSCULAR; INTRAVENOUS; SUBCUTANEOUS at 19:37

## 2023-08-06 RX ADMIN — SODIUM CHLORIDE 1000 MILLILITER(S): 9 INJECTION, SOLUTION INTRAVENOUS at 22:29

## 2023-08-06 RX ADMIN — ONDANSETRON 4 MILLIGRAM(S): 8 TABLET, FILM COATED ORAL at 20:47

## 2023-08-06 NOTE — ED ADULT TRIAGE NOTE - CHIEF COMPLAINT QUOTE
Pt sent from urgent care for hypotension and lightheadedness starting yesterday, Phx HTN, Lung CA (last treatment 7/22.

## 2023-08-06 NOTE — ED PROVIDER NOTE - OBJECTIVE STATEMENT
Patient is a 67y F PMHx HTN, Lung CA (on chemo, last 7/22/23), p/w lightheadedness. Patient with body aches, cough, chills x1 week. Also with decreased PO. Today patient felt lightheaded, went to UC and found to be hypotensive. Denies CP, SOB, nvd, recorded fevers, abdominal pain.

## 2023-08-06 NOTE — ED PROVIDER NOTE - PROGRESS NOTE DETAILS
Pt reassessed, reporting increased urinary frequency and diarrhea, otherwise asymptomatic at this time, 3rd liter IVF ordered. Given worsening lactate and persistent hypotension, broad spec IV abx given, possible UTI as source. Heme/onc consulted as pt currently in active treatment for lung ca, follows w/[facility] heme/onc to see in am; pending admission at this time, pt agreeable to staying. - Paola Mahmood, PGY-3 Pt w/worsening hypotension, d/w hospitalist, MICU consulted for worsening hypotension, pt asymptomatic at this time. - Paola Mahmood, PGY-3

## 2023-08-06 NOTE — ED PROVIDER NOTE - CLINICAL SUMMARY MEDICAL DECISION MAKING FREE TEXT BOX
Patient is a 67y F PMHx HTN, Lung CA (on chemo, last 7/22/23), p/w lightheadedness. Most likely with viral illness. Will get EKG, r/o arrythmia. Will get labs, CXR, RVP, r/o infectious etiology. No CP, SOB, or BURGESS, do not suspect ACS.

## 2023-08-06 NOTE — ED PROVIDER NOTE - ATTENDING CONTRIBUTION TO CARE
67-year-old female past medical history of hypertension presents to ED for evaluation of lightheadedness with body aches, cough and chills over the past week with decreased p.o. intake PE: Well appearing, RRR, CTA BL lungs, abd soft NTND A/P Labs, imaging, medicate, reassess

## 2023-08-06 NOTE — ED PROVIDER NOTE - PHYSICAL EXAMINATION
GENERAL: no acute distress, non-toxic appearing  HEAD: normocephalic, atraumatic  HEENT: PERRLA, EOMI, normal conjunctiva, oral mucosa moist  CARDIAC: regular rate and rhythm, no murmurs  PULM: clear to ascultation bilaterally, no crackles, rales, rhonchi, or wheezing  GI: abdomen nondistended, soft, nontender, no guarding or rebound tenderness  : no CVA tenderness, no suprapubic tenderness  NEURO: alert and oriented x 3, normal speech, no gross neurologic deficit  MSK: no visible deformities, no peripheral edema, calf tenderness/redness/swelling  SKIN: no visible rashes, dry, well-perfused  PSYCH: appropriate mood and affect

## 2023-08-07 DIAGNOSIS — A41.9 SEPSIS, UNSPECIFIED ORGANISM: ICD-10-CM

## 2023-08-07 DIAGNOSIS — E27.40 UNSPECIFIED ADRENOCORTICAL INSUFFICIENCY: ICD-10-CM

## 2023-08-07 DIAGNOSIS — E05.90 THYROTOXICOSIS, UNSPECIFIED WITHOUT THYROTOXIC CRISIS OR STORM: ICD-10-CM

## 2023-08-07 DIAGNOSIS — I95.9 HYPOTENSION, UNSPECIFIED: ICD-10-CM

## 2023-08-07 DIAGNOSIS — C34.90 MALIGNANT NEOPLASM OF UNSPECIFIED PART OF UNSPECIFIED BRONCHUS OR LUNG: ICD-10-CM

## 2023-08-07 DIAGNOSIS — Z86.39 PERSONAL HISTORY OF OTHER ENDOCRINE, NUTRITIONAL AND METABOLIC DISEASE: ICD-10-CM

## 2023-08-07 DIAGNOSIS — N17.9 ACUTE KIDNEY FAILURE, UNSPECIFIED: ICD-10-CM

## 2023-08-07 DIAGNOSIS — Z29.9 ENCOUNTER FOR PROPHYLACTIC MEASURES, UNSPECIFIED: ICD-10-CM

## 2023-08-07 DIAGNOSIS — I10 ESSENTIAL (PRIMARY) HYPERTENSION: ICD-10-CM

## 2023-08-07 LAB
ALBUMIN SERPL ELPH-MCNC: 3.1 G/DL — LOW (ref 3.3–5)
ALP SERPL-CCNC: 31 U/L — LOW (ref 40–120)
ALT FLD-CCNC: 10 U/L — SIGNIFICANT CHANGE UP (ref 4–33)
ANION GAP SERPL CALC-SCNC: 11 MMOL/L — SIGNIFICANT CHANGE UP (ref 7–14)
ANION GAP SERPL CALC-SCNC: 9 MMOL/L — SIGNIFICANT CHANGE UP (ref 7–14)
AST SERPL-CCNC: 20 U/L — SIGNIFICANT CHANGE UP (ref 4–32)
BASE EXCESS BLDV CALC-SCNC: -6.7 MMOL/L — LOW (ref -2–3)
BASOPHILS # BLD AUTO: 0.08 K/UL — SIGNIFICANT CHANGE UP (ref 0–0.2)
BASOPHILS NFR BLD AUTO: 0.9 % — SIGNIFICANT CHANGE UP (ref 0–2)
BILIRUB SERPL-MCNC: 0.3 MG/DL — SIGNIFICANT CHANGE UP (ref 0.2–1.2)
BUN SERPL-MCNC: 18 MG/DL — SIGNIFICANT CHANGE UP (ref 7–23)
BUN SERPL-MCNC: 21 MG/DL — SIGNIFICANT CHANGE UP (ref 7–23)
C DIFF BY PCR RESULT: SIGNIFICANT CHANGE UP
CA-I SERPL-SCNC: 1.18 MMOL/L — SIGNIFICANT CHANGE UP (ref 1.15–1.33)
CALCIUM SERPL-MCNC: 8.8 MG/DL — SIGNIFICANT CHANGE UP (ref 8.4–10.5)
CALCIUM SERPL-MCNC: 9 MG/DL — SIGNIFICANT CHANGE UP (ref 8.4–10.5)
CHLORIDE BLDV-SCNC: 102 MMOL/L — SIGNIFICANT CHANGE UP (ref 96–108)
CHLORIDE SERPL-SCNC: 101 MMOL/L — SIGNIFICANT CHANGE UP (ref 98–107)
CHLORIDE SERPL-SCNC: 103 MMOL/L — SIGNIFICANT CHANGE UP (ref 98–107)
CO2 BLDV-SCNC: 22.9 MMOL/L — SIGNIFICANT CHANGE UP (ref 22–26)
CO2 SERPL-SCNC: 21 MMOL/L — LOW (ref 22–31)
CO2 SERPL-SCNC: 22 MMOL/L — SIGNIFICANT CHANGE UP (ref 22–31)
CORTIS AM PEAK SERPL-MCNC: 0.5 UG/DL — LOW (ref 6–18.4)
CREAT SERPL-MCNC: 1.68 MG/DL — HIGH (ref 0.5–1.3)
CREAT SERPL-MCNC: 1.71 MG/DL — HIGH (ref 0.5–1.3)
EGFR: 33 ML/MIN/1.73M2 — LOW
EGFR: 34 ML/MIN/1.73M2 — LOW
EOSINOPHIL # BLD AUTO: 0.24 K/UL — SIGNIFICANT CHANGE UP (ref 0–0.5)
EOSINOPHIL NFR BLD AUTO: 2.6 % — SIGNIFICANT CHANGE UP (ref 0–6)
GAS PNL BLDV: 130 MMOL/L — LOW (ref 136–145)
GAS PNL BLDV: SIGNIFICANT CHANGE UP
GAS PNL BLDV: SIGNIFICANT CHANGE UP
GLUCOSE BLDV-MCNC: 120 MG/DL — HIGH (ref 70–99)
GLUCOSE SERPL-MCNC: 123 MG/DL — HIGH (ref 70–99)
GLUCOSE SERPL-MCNC: 99 MG/DL — SIGNIFICANT CHANGE UP (ref 70–99)
HCO3 BLDV-SCNC: 21 MMOL/L — LOW (ref 22–29)
HCT VFR BLD CALC: 34.9 % — SIGNIFICANT CHANGE UP (ref 34.5–45)
HCT VFR BLDA CALC: 38 % — SIGNIFICANT CHANGE UP (ref 34.5–46.5)
HGB BLD CALC-MCNC: 12.6 G/DL — SIGNIFICANT CHANGE UP (ref 11.7–16.1)
HGB BLD-MCNC: 11.8 G/DL — SIGNIFICANT CHANGE UP (ref 11.5–15.5)
IANC: 3.91 K/UL — SIGNIFICANT CHANGE UP (ref 1.8–7.4)
LACTATE BLDV-MCNC: 1.5 MMOL/L — SIGNIFICANT CHANGE UP (ref 0.5–2)
LYMPHOCYTES # BLD AUTO: 0.94 K/UL — LOW (ref 1–3.3)
LYMPHOCYTES # BLD AUTO: 10.4 % — LOW (ref 13–44)
MACROCYTES BLD QL: SIGNIFICANT CHANGE UP
MAGNESIUM SERPL-MCNC: 1.3 MG/DL — LOW (ref 1.6–2.6)
MANUAL SMEAR VERIFICATION: SIGNIFICANT CHANGE UP
MCHC RBC-ENTMCNC: 31.2 PG — SIGNIFICANT CHANGE UP (ref 27–34)
MCHC RBC-ENTMCNC: 33.8 GM/DL — SIGNIFICANT CHANGE UP (ref 32–36)
MCV RBC AUTO: 92.3 FL — SIGNIFICANT CHANGE UP (ref 80–100)
MONOCYTES # BLD AUTO: 1.26 K/UL — HIGH (ref 0–0.9)
MONOCYTES NFR BLD AUTO: 13.9 % — SIGNIFICANT CHANGE UP (ref 2–14)
NEUTROPHILS # BLD AUTO: 5.12 K/UL — SIGNIFICANT CHANGE UP (ref 1.8–7.4)
NEUTROPHILS NFR BLD AUTO: 53.9 % — SIGNIFICANT CHANGE UP (ref 43–77)
NEUTS BAND # BLD: 2.6 % — SIGNIFICANT CHANGE UP (ref 0–6)
OVALOCYTES BLD QL SMEAR: SLIGHT — SIGNIFICANT CHANGE UP
PCO2 BLDV: 52 MMHG — SIGNIFICANT CHANGE UP (ref 39–52)
PH BLDV: 7.22 — LOW (ref 7.32–7.43)
PHOSPHATE SERPL-MCNC: 4.5 MG/DL — SIGNIFICANT CHANGE UP (ref 2.5–4.5)
PLAT MORPH BLD: NORMAL — SIGNIFICANT CHANGE UP
PLATELET # BLD AUTO: 265 K/UL — SIGNIFICANT CHANGE UP (ref 150–400)
PLATELET COUNT - ESTIMATE: NORMAL — SIGNIFICANT CHANGE UP
PO2 BLDV: 38 MMHG — SIGNIFICANT CHANGE UP (ref 25–45)
POTASSIUM BLDV-SCNC: 4.4 MMOL/L — SIGNIFICANT CHANGE UP (ref 3.5–5.1)
POTASSIUM SERPL-MCNC: 4.4 MMOL/L — SIGNIFICANT CHANGE UP (ref 3.5–5.3)
POTASSIUM SERPL-MCNC: 4.8 MMOL/L — SIGNIFICANT CHANGE UP (ref 3.5–5.3)
POTASSIUM SERPL-SCNC: 4.4 MMOL/L — SIGNIFICANT CHANGE UP (ref 3.5–5.3)
POTASSIUM SERPL-SCNC: 4.8 MMOL/L — SIGNIFICANT CHANGE UP (ref 3.5–5.3)
PROCALCITONIN SERPL-MCNC: 0.08 NG/ML — SIGNIFICANT CHANGE UP (ref 0.02–0.1)
PROT SERPL-MCNC: 6.3 G/DL — SIGNIFICANT CHANGE UP (ref 6–8.3)
RBC # BLD: 3.78 M/UL — LOW (ref 3.8–5.2)
RBC # FLD: 13.2 % — SIGNIFICANT CHANGE UP (ref 10.3–14.5)
RBC BLD AUTO: NORMAL — SIGNIFICANT CHANGE UP
ROULEAUX BLD QL SMEAR: PRESENT
SAO2 % BLDV: 62.1 % — LOW (ref 67–88)
SMUDGE CELLS # BLD: PRESENT — SIGNIFICANT CHANGE UP
SODIUM SERPL-SCNC: 133 MMOL/L — LOW (ref 135–145)
SODIUM SERPL-SCNC: 134 MMOL/L — LOW (ref 135–145)
T4 FREE SERPL-MCNC: 0.7 NG/DL — LOW (ref 0.9–1.8)
TSH SERPL-MCNC: 3.07 UIU/ML — SIGNIFICANT CHANGE UP (ref 0.27–4.2)
VARIANT LYMPHS # BLD: 15.7 % — HIGH (ref 0–6)
WBC # BLD: 9.07 K/UL — SIGNIFICANT CHANGE UP (ref 3.8–10.5)
WBC # FLD AUTO: 9.07 K/UL — SIGNIFICANT CHANGE UP (ref 3.8–10.5)

## 2023-08-07 PROCEDURE — 99223 1ST HOSP IP/OBS HIGH 75: CPT | Mod: GC

## 2023-08-07 PROCEDURE — 99223 1ST HOSP IP/OBS HIGH 75: CPT

## 2023-08-07 PROCEDURE — 12345: CPT | Mod: NC

## 2023-08-07 RX ORDER — HYDROCORTISONE 20 MG
100 TABLET ORAL ONCE
Refills: 0 | Status: COMPLETED | OUTPATIENT
Start: 2023-08-07 | End: 2023-08-07

## 2023-08-07 RX ORDER — ENOXAPARIN SODIUM 100 MG/ML
40 INJECTION SUBCUTANEOUS EVERY 24 HOURS
Refills: 0 | Status: DISCONTINUED | OUTPATIENT
Start: 2023-08-07 | End: 2023-08-12

## 2023-08-07 RX ORDER — PIPERACILLIN AND TAZOBACTAM 4; .5 G/20ML; G/20ML
3.38 INJECTION, POWDER, LYOPHILIZED, FOR SOLUTION INTRAVENOUS EVERY 8 HOURS
Refills: 0 | Status: DISCONTINUED | OUTPATIENT
Start: 2023-08-07 | End: 2023-08-08

## 2023-08-07 RX ORDER — HYDROCORTISONE 20 MG
50 TABLET ORAL EVERY 6 HOURS
Refills: 0 | Status: DISCONTINUED | OUTPATIENT
Start: 2023-08-07 | End: 2023-08-08

## 2023-08-07 RX ORDER — ASPIRIN/CALCIUM CARB/MAGNESIUM 324 MG
81 TABLET ORAL DAILY
Refills: 0 | Status: DISCONTINUED | OUTPATIENT
Start: 2023-08-07 | End: 2023-08-12

## 2023-08-07 RX ORDER — SODIUM CHLORIDE 9 MG/ML
1000 INJECTION, SOLUTION INTRAVENOUS ONCE
Refills: 0 | Status: COMPLETED | OUTPATIENT
Start: 2023-08-07 | End: 2023-08-07

## 2023-08-07 RX ORDER — SODIUM CHLORIDE 9 MG/ML
1000 INJECTION, SOLUTION INTRAVENOUS
Refills: 0 | Status: DISCONTINUED | OUTPATIENT
Start: 2023-08-07 | End: 2023-08-12

## 2023-08-07 RX ORDER — PIPERACILLIN AND TAZOBACTAM 4; .5 G/20ML; G/20ML
3.38 INJECTION, POWDER, LYOPHILIZED, FOR SOLUTION INTRAVENOUS EVERY 8 HOURS
Refills: 0 | Status: DISCONTINUED | OUTPATIENT
Start: 2023-08-07 | End: 2023-08-07

## 2023-08-07 RX ORDER — SODIUM CHLORIDE 9 MG/ML
1000 INJECTION, SOLUTION INTRAVENOUS ONCE
Refills: 0 | Status: DISCONTINUED | OUTPATIENT
Start: 2023-08-07 | End: 2023-08-07

## 2023-08-07 RX ORDER — MAGNESIUM SULFATE 500 MG/ML
4 VIAL (ML) INJECTION ONCE
Refills: 0 | Status: COMPLETED | OUTPATIENT
Start: 2023-08-07 | End: 2023-08-07

## 2023-08-07 RX ORDER — TOBRAMYCIN 0.3 %
1 DROPS OPHTHALMIC (EYE) EVERY 6 HOURS
Refills: 0 | Status: DISCONTINUED | OUTPATIENT
Start: 2023-08-07 | End: 2023-08-12

## 2023-08-07 RX ORDER — ASPIRIN/CALCIUM CARB/MAGNESIUM 324 MG
1 TABLET ORAL
Qty: 0 | Refills: 0 | DISCHARGE

## 2023-08-07 RX ADMIN — SODIUM CHLORIDE 1000 MILLILITER(S): 9 INJECTION, SOLUTION INTRAVENOUS at 12:15

## 2023-08-07 RX ADMIN — Medication 100 MILLIGRAM(S): at 13:51

## 2023-08-07 RX ADMIN — Medication 25 GRAM(S): at 10:44

## 2023-08-07 RX ADMIN — Medication 1 APPLICATION(S): at 22:51

## 2023-08-07 RX ADMIN — SODIUM CHLORIDE 75 MILLILITER(S): 9 INJECTION, SOLUTION INTRAVENOUS at 13:41

## 2023-08-07 RX ADMIN — PIPERACILLIN AND TAZOBACTAM 25 GRAM(S): 4; .5 INJECTION, POWDER, LYOPHILIZED, FOR SOLUTION INTRAVENOUS at 08:35

## 2023-08-07 RX ADMIN — SODIUM CHLORIDE 1000 MILLILITER(S): 9 INJECTION, SOLUTION INTRAVENOUS at 01:50

## 2023-08-07 RX ADMIN — PIPERACILLIN AND TAZOBACTAM 25 GRAM(S): 4; .5 INJECTION, POWDER, LYOPHILIZED, FOR SOLUTION INTRAVENOUS at 17:36

## 2023-08-07 RX ADMIN — ENOXAPARIN SODIUM 40 MILLIGRAM(S): 100 INJECTION SUBCUTANEOUS at 12:15

## 2023-08-07 RX ADMIN — Medication 250 MILLIGRAM(S): at 00:45

## 2023-08-07 RX ADMIN — Medication 81 MILLIGRAM(S): at 12:15

## 2023-08-07 RX ADMIN — Medication 50 MILLIGRAM(S): at 22:51

## 2023-08-07 RX ADMIN — PIPERACILLIN AND TAZOBACTAM 25 GRAM(S): 4; .5 INJECTION, POWDER, LYOPHILIZED, FOR SOLUTION INTRAVENOUS at 22:30

## 2023-08-07 NOTE — CONSULT NOTE ADULT - NS ATTEND AMEND GEN_ALL_CORE FT
ES SCLC on atezolizumab maintenance  admitted for sepsis  follow up infection work up  appreciate medicine care   no active management from Oncology

## 2023-08-07 NOTE — CONSULT NOTE ADULT - SUBJECTIVE AND OBJECTIVE BOX
NOTE INCOMPLETE/ IN PROGRESS  *Please wait for attending attestation for official recommendations.     HPI:  Patient is a 67y F PMHx HTN, Lung CA (on chemo, last 7/22/23), p/w lightheadedness for 2 days. Patient reports that she has been having intermittent episodes of diarrhea since 8/3. Her son, whom she lives with, initially experienced diarrhea for approximately a week prior. Pt denies recent travel hx or other events. Pt has also had decreased PO intake since then as well. She presented to urgent care because she felt lightheadeded and was noted to be hypotensive, was instructed to come to hospital. Pt endorses a chronic dry cough. She denies fevers, chest pain, dyspnea, nausea, vomiting, melena/hematochezia, abdominal pain and dysuria. (07 Aug 2023 02:21)    Reason for consult: Adrenal insufficiency, Hx of hyperthyroidism    Patient was diagnosed with small cell lung cancer in July 2022. She has been treated with maintenance Atezolizumab since 11/2022 and denies experiencing adverse effects until now. Pt endorses throwing up once yesterday evening 8/6 as well as multiple days of intermittent diarrhea and fatigue. She denies Hx of adrenal issues and known pituitary tumor in the past as well as family history of these conditions. Her BP is generally around 110/70 at home and she denies previous episodes of hypotension.     Regarding her hyperthyroidism, the patient states she has been following with a [facility] endocrinologist and takes methimazole 5mg. She endorses weight loss over the last few years which her and daughter acknowledge may also be due to her cancer. She denies other symptoms of hyperthyroidism such as feeling hot/cold and palpitations.       PAST MEDICAL & SURGICAL HISTORY:  HTN (hypertension)    Hyperthyroidism    Lung cancer    History of cholecystectomy  1989    FAMILY HISTORY:  FH: HTN (hypertension) (Mother)    Outpatient Medications:    MEDICATIONS  (STANDING):  aspirin enteric coated 81 milliGRAM(s) Oral daily  enoxaparin Injectable 40 milliGRAM(s) SubCutaneous every 24 hours  hydrocortisone sodium succinate Injectable 50 milliGRAM(s) IV Push every 6 hours  lactated ringers. 1000 milliLiter(s) (75 mL/Hr) IV Continuous <Continuous>  piperacillin/tazobactam IVPB.. 3.375 Gram(s) IV Intermittent every 8 hours  tobramycin 0.3% Ointment 1 Application(s) Left EYE every 6 hours    MEDICATIONS  (PRN):    Allergies    Tylenol (Stomach Upset)    Intolerances    Review of Systems:  Constitutional: No fever  Eyes: No blurry vision  Neuro: No tremors  HEENT: No pain  Cardiovascular: No chest pain, palpitations  Respiratory: No SOB, no cough  GI: +Vomiting, diarrhea.   : No dysuria  Skin: no rash  Psych: no depression  Endocrine: no polyuria, polydipsia  Hem/lymph: +Weight loss. no swelling  Osteoporosis: no fractures    ALL OTHER SYSTEMS REVIEWED AND NEGATIVE    PHYSICAL EXAM:  VITALS: T(C): 36.9 (08-07-23 @ 14:51)  T(F): 98.5 (08-07-23 @ 14:51), Max: 98.7 (08-06-23 @ 18:23)  HR: 89 (08-07-23 @ 14:51) (83 - 112)  BP: 118/79 (08-07-23 @ 14:51) (81/54 - 122/68)  RR:  (17 - 20)  SpO2:  (96% - 100%)    GENERAL: NAD, well-groomed, well-developed  EYES: No proptosis, no lid lag, anicteric  HEENT:  Atraumatic, Normocephalic, moist mucous membranes  THYROID: Enlarged, no palpable nodules  RESPIRATORY: Clear to auscultation bilaterally; No rales, rhonchi, wheezing  CARDIOVASCULAR: Regular rate and rhythm; No murmurs; no peripheral edema  GI: Soft, nontender, non distended, normal bowel sounds  SKIN: Dry, intact, No rashes or lesions  MUSCULOSKELETAL: Full range of motion, normal strength  NEURO: sensation intact, extraocular movements intact, no tremor  PSYCH: Alert and oriented x 3, normal affect, normal mood  CUSHING'S SIGNS: no striae      LABS:                  11.8   9.07  )-----------( 265      ( 07 Aug 2023 06:24 )             34.9       08-07    134<L>  |  103  |  18  ----------------------------<  99  4.8   |  22  |  1.71<H>    eGFR: 33<L>    Ca    9.0      08-07  Mg     1.30     08-07  Phos  4.5     08-07    TPro  6.3  /  Alb  3.1<L>  /  TBili  0.3  /  DBili  x   /  AST  20  /  ALT  10  /  AlkPhos  31<L>  08-07      Thyroid Function Tests:  08-07 @ 06:24 TSH 3.07 FreeT4 0.7 T3 -- Anti TPO -- Anti Thyroglobulin Ab -- TSI --      Cortisol AM, Serum: 0.5 (08-07)               HPI:  Patient is a 67y F PMHx HTN, Lung CA (on chemo, last 7/22/23), p/w lightheadedness for 2 days. Patient reports that she has been having intermittent episodes of diarrhea since 8/3. Her son, whom she lives with, initially experienced diarrhea for approximately a week prior. Pt denies recent travel hx or other events. Pt has also had decreased PO intake since then as well. She presented to urgent care because she felt lightheadeded and was noted to be hypotensive, was instructed to come to hospital. Pt endorses a chronic dry cough. She denies fevers, chest pain, dyspnea, nausea, vomiting, melena/hematochezia, abdominal pain and dysuria. (07 Aug 2023 02:21)    Reason for consult: Adrenal insufficiency, Hx of hyperthyroidism    Patient was diagnosed with small cell lung cancer in July 2022. She has been treated with maintenance Atezolizumab since 11/2022 (last dose per pt was in 6/2023) and denies experiencing adverse effects until now. Pt endorses throwing up once yesterday evening 8/6 as well as multiple days of intermittent diarrhea and fatigue. She denies Hx of adrenal issues or known pituitary tumor in the past as well as family history of these conditions. Her BP is generally around 110/70 at home and she denies previous episodes of hypotension. She does not usually take steroids. She does not have headaches.     Regarding her hyperthyroidism, the patient states she has been following with a [facility] endocrinologist and takes methimazole 5mg, which was started several years ago. She does not recall having a history of Graves or nodules. She is not sure about thyroid antibodies. She denies other autoimmune conditions in her self. Notes that daughter has hypothyroidism. She endorses weight loss over the last few years which her and daughter acknowledge may also be due to her cancer. She denies other symptoms of hyperthyroidism such as feeling hot/cold and palpitations.     Of note, pt reports she can only get standing/open MRI due to claustrophobia.      PAST MEDICAL & SURGICAL HISTORY:  HTN (hypertension)    Hyperthyroidism    Lung cancer    History of cholecystectomy  1989    FAMILY HISTORY:  FH: HTN (hypertension) (Mother)    Outpatient Medications:    MEDICATIONS  (STANDING):  aspirin enteric coated 81 milliGRAM(s) Oral daily  enoxaparin Injectable 40 milliGRAM(s) SubCutaneous every 24 hours  hydrocortisone sodium succinate Injectable 50 milliGRAM(s) IV Push every 6 hours  lactated ringers. 1000 milliLiter(s) (75 mL/Hr) IV Continuous <Continuous>  piperacillin/tazobactam IVPB.. 3.375 Gram(s) IV Intermittent every 8 hours  tobramycin 0.3% Ointment 1 Application(s) Left EYE every 6 hours    MEDICATIONS  (PRN):    Allergies    Tylenol (Stomach Upset)    Intolerances    Review of Systems:  Constitutional: No fever  Eyes: No blurry vision  Neuro: No tremors  HEENT: No pain  Cardiovascular: No chest pain, palpitations  Respiratory: No SOB, no cough  GI: +Vomiting, diarrhea.   : No dysuria  Skin: no rash  Psych: no depression  Endocrine: no polyuria, polydipsia  Hem/lymph: +Weight loss. no swelling  Osteoporosis: no fractures    ALL OTHER SYSTEMS REVIEWED AND NEGATIVE    PHYSICAL EXAM:  VITALS: T(C): 36.9 (08-07-23 @ 14:51)  T(F): 98.5 (08-07-23 @ 14:51), Max: 98.7 (08-06-23 @ 18:23)  HR: 89 (08-07-23 @ 14:51) (83 - 112)  BP: 118/79 (08-07-23 @ 14:51) (81/54 - 122/68)  RR:  (17 - 20)  SpO2:  (96% - 100%)    GENERAL: NAD, well-groomed, well-developed  EYES: No proptosis, no lid lag, anicteric  HEENT:  Atraumatic, Normocephalic, moist mucous membranes  THYROID: Enlarged, no palpable nodules  RESPIRATORY: Clear to auscultation bilaterally; No rales, rhonchi, wheezing  CARDIOVASCULAR: Regular rate and rhythm; No murmurs; no peripheral edema  GI: Soft, nontender, non distended, normal bowel sounds  SKIN: Dry, intact, No rashes or lesions  MUSCULOSKELETAL: Full range of motion, normal strength  NEURO: sensation intact, extraocular movements intact, no tremor  PSYCH: Alert and oriented x 3, normal affect, normal mood  CUSHING'S SIGNS: no striae      LABS:                  11.8   9.07  )-----------( 265      ( 07 Aug 2023 06:24 )             34.9       08-07    134<L>  |  103  |  18  ----------------------------<  99  4.8   |  22  |  1.71<H>    eGFR: 33<L>    Ca    9.0      08-07  Mg     1.30     08-07  Phos  4.5     08-07    TPro  6.3  /  Alb  3.1<L>  /  TBili  0.3  /  DBili  x   /  AST  20  /  ALT  10  /  AlkPhos  31<L>  08-07      Thyroid Function Tests:  08-07 @ 06:24 TSH 3.07 FreeT4 0.7 T3 -- Anti TPO -- Anti Thyroglobulin Ab -- TSI --      Cortisol AM, Serum: 0.5 (08-07)

## 2023-08-07 NOTE — CONSULT NOTE ADULT - ASSESSMENT
70 year old woman with history of small cell lung cancer on active chemo (last July 2023), HTN, hyperthyroidism presenting to the hospital from urgent care for hypotension. History significant for diarrheal illness this past week along with increased urinary frequency with signs of possible UTI on UA, and instance of orthostasis, concerning for hypovolemia in the setting of volume loss and decreased PO intake. Now s/p 3L IVF and IV antibiotics. MICU consulted for hypotension.    #Hypotension  - Diarrheal illness, increased urinary frequency with decreased PO intake endorsed  - One instance of endorsed lightheadedness upon standing from bent over position, concerning for orthostasis  - Positive appearing UA  - S/P 3L in ED with 4th liter ordered  - Last BP while in ED during examination noted to be   - Hypotension likely in setting of hypovolemia given decreased PO intake/diarrheal losses/increased urinary frequency with component of ?UTI  - Patient to receive 4th liter of IVF, recommend also continuing on maintenance fluids, can consider 75cc/hr LR. No signs of volume overload on exam  - Encourage PO intake as tolerated  - Once adequately volume resuscitated can consider addition of low dose midodrine TID if clinically warranted  - Patient at this time not a MICU candidate. Please reconsult as needed.    **Recommendations tentative until attested to by attending** 70 year old woman with history of small cell lung cancer on active chemo (last July 2023), HTN, hyperthyroidism presenting to the hospital from urgent care for hypotension. History significant for diarrheal illness this past week along with increased urinary frequency with signs of possible UTI on UA, and instance of orthostasis, concerning for hypovolemia in the setting of volume loss and decreased PO intake. Now s/p 3L IVF and IV antibiotics. MICU consulted for hypotension.    #Hypotension  - Diarrheal illness, increased urinary frequency with decreased PO intake endorsed  - One instance of endorsed lightheadedness upon standing from bent over position, concerning for orthostasis  - Positive appearing UA  - S/P 3L in ED with 4th liter ordered  - Last BP while in ED during examination noted to be   - Hypotension likely in setting of hypovolemia given decreased PO intake/diarrheal losses/increased urinary frequency with component of ?UTI  - Patient to receive 4th liter of IVF, recommend also continuing on maintenance fluids, can consider 75cc/hr LR. No signs of volume overload on exam  - Encourage PO intake as tolerated  - Recommend holding home BP medications at this time  - Once adequately volume resuscitated can consider addition of low dose midodrine TID if clinically warranted  - Patient at this time not a MICU candidate. Please reconsult as needed.    **Recommendations tentative until attested to by attending**

## 2023-08-07 NOTE — PROGRESS NOTE ADULT - PROBLEM SELECTOR PLAN 1
Pt meets sepsis criteria with tachycardia and tachypnea with likely GI source vs UTI   Per outpt review, pt bp has ranged from 114/76 -156/89  - r/o adrenal insufficiency /colitis from immunotherapy, consult endocrine (emailed), will give iv hydrocortisone 100mg x1  -CXR negative  -RVP negative   -UCx results pending  -BCx results pending  -check stool for c diff and GI PCR  -s/p vanc and Zosyn  -c/w zosyn,  - ID consult appreciated Pt meets sepsis criteria with tachycardia and tachypnea with likely GI source vs UTI   Per outpt review, pt bp has ranged from 114/76 -156/89  - r/o adrenal insufficiency /colitis from immunotherapy, consult endocrine (emailed), will give iv hydrocortisone 100mg x1  - give 1 more liter LR bolus  - check TTE  -CXR negative, RVP negative   -UCx results pending, UA not impressive  -BCx results pending  -check stool for c diff and GI PCR  -s/p vanc and Zosyn  -c/w zosyn,  - ID consult appreciated Pt meets sepsis criteria with tachycardia and tachypnea with likely GI source vs UTI   Per outpt review, pt bp has ranged from 114/76 -156/89  - r/o adrenal insufficiency /colitis from immunotherapy, consult endocrine (emailed), will give iv hydrocortisone 100mg x1  - give 1 more liter LR bolus  - check TTE  -CXR negative, RVP negative   -UCx results pending, UA not impressive  -BCx results pending  -check stool for c diff and GI PCR  -s/p vanc and Zosyn  -c/w zosyn,  -pt also has left eye conjunctivitis, c/w tobramycin eye drop  - ID consult appreciated

## 2023-08-07 NOTE — CONSULT NOTE ADULT - ASSESSMENT
Patient is a 67y F PMHx HTN, small cell lung cancer (s/p chemo and immunotherapy, now on maintenance Atezolizumab as of Nov 2022) , presenting with lightheadedness for 2 days and intermittent diarrhea. Pt hypotensive at urgent care with BP 81/54 in ED, minimal improvement following multiple fluid boluses, with serum cortisol 0.5, consistent with adrenal insufficiency.    #Adrenal Insufficiency/Hypotension  - Pt with serum cortisol 0.5, diagnostic of adrenal insufficiency, which is a known complication of checkpoint inhibitor immunotherapy.   - Hypotensive to 81/54 in ED with minimal improvement following fluid bolus x4 and maintenance fluids  - 100 mg Hydrocortisone IVP given w/ improvement BP to 118/79  - Continue hydrocortisone 50mg q8, monitor BP and symptoms for improvement  - Pt will need to be discharged on po steroids    #Hyperthyroidism   - Patient unsure as to cause of hyperthyroidism, denies any active symptoms. Enlarged thyroid on exam.   - TSH 3.07, free thyroxine 0.7 on 8/7  - On methimazole 5mg at home; dose held today Patient is a 67y F PMHx HTN, small cell lung cancer (s/p chemo and immunotherapy, now on maintenance Atezolizumab as of Nov 2022) , presenting with lightheadedness for 2 days and intermittent diarrhea. Pt hypotensive at urgent care with BP 81/54 in ED, minimal improvement following multiple fluid boluses, with serum cortisol 0.5, consistent with adrenal insufficiency.    #Adrenal Insufficiency/Hypotension  - Pt with serum cortisol 0.5, diagnostic of adrenal insufficiency, which is a known complication of checkpoint inhibitor immunotherapy.   - Hypotensive to 81/54 in ED with minimal improvement following fluid bolus x4 and maintenance fluids  - 100 mg Hydrocortisone IVP given w/ improvement BP to 118/79  - Continue hydrocortisone 50mg q8, monitor BP and symptoms for improvement; can decrease hydrocortisone to 50 mg BID with 24 hours clinical and symptomatic improvement   - Must consider hypophysitis; please order LH, FSH, IGF-1, ACTH (with AM labs), estradiol. Will need pituitary MRI   - Pt will need to be discharged on po steroids    #Hyperthyroidism   - Patient unsure as to cause of hyperthyroidism, denies any active symptoms. Enlarged thyroid on exam.   - TSH 3.07, free thyroxine 0.7 on 8/7  - On methimazole 5mg at home; dose held today. Continue to hold.  - Please order TSH receptor Ab, TSI Ab  - Repeat free T4 in 2 days  Patient is a 67y F PMHx HTN, small cell lung cancer (s/p chemo and immunotherapy, now on maintenance Atezolizumab as of Nov 2022) , presenting with lightheadedness for 2 days and intermittent diarrhea. Pt hypotensive at urgent care with BP 81/54 in ED, minimal improvement following multiple fluid boluses, with serum cortisol 0.5, consistent with adrenal insufficiency.    #Adrenal Insufficiency  #Hypotension  - Pt with serum cortisol 0.5, diagnostic of adrenal insufficiency, which is a known complication of checkpoint inhibitor immunotherapy.   - Hypotensive to 81/54 in ED with minimal improvement following fluid bolus x4 and maintenance fluids  - 100 mg Hydrocortisone IVP given w/ improvement BP to 118/79  - Continue hydrocortisone 50mg IV q8 for now, monitor BP and symptoms for improvement; can decrease hydrocortisone to 50 mg BID with 24 hours clinical and symptomatic improvement   - Must consider hypophysitis; please order LH, FSH, IGF-1, ACTH (with AM labs), estradiol, and prolactin  - Will consider pituitary MRI, however patient has issues w/ claustrophobia as per primary team    To help with discharge planning, Please print and give the pt info section for patients under adrenal insufficiency (this will educate the patient regarding the warning signs of adrenal crisis )   -  patient that they should take 2-3x her home dose in cases of illness, fever, accidents, and surgery  - pt should receive stress dosing (hydrocortisone 50mg q8) with any major illness or surgical procedure  - Should pt be unable to tolerate PO and is unable to take hydrocortisone, they will need an emergency injection. Please discharge with a prescription for 100 mg Solu-Cortef Act-O-Vial and the following instructions:  [email or web address]-[email or web address]-[email or web address]-vial-[email or web address]-[email or web address]  - pt should obtain a medical alert bracelet or necklace to inform emergency providers that they have adrenal insufficiency  [email or web address]  - Patient should also monitor BP closely at home    #Hyperthyroidism   - Patient unsure as to cause of hyperthyroidism, denies any active symptoms. Enlarged thyroid on exam.   - TSH 3.07, free thyroxine 0.7 on 8/7  - abnormal TFTs could be due to overtreatment of hyperthyroidism vs hypophysitis i/s/o checkpoint inhibitor therapy  - On methimazole 5mg at home; dose held today.   - hold methimazole for now  - Recheck FT4 on Wednesday, 8/9  - Please order TSH receptor Ab, TSI Ab Patient is a 67y F PMHx HTN, small cell lung cancer (s/p chemo and immunotherapy, now on maintenance Atezolizumab as of Nov 2022) , presenting with lightheadedness for 2 days and intermittent diarrhea. Pt hypotensive at urgent care with BP 81/54 in ED, minimal improvement following multiple fluid boluses, with serum cortisol 0.5, consistent with adrenal insufficiency.    #Adrenal Insufficiency  #Hypotension  - Pt with serum cortisol 0.5, diagnostic of adrenal insufficiency, which is a known complication of checkpoint inhibitor immunotherapy.   - Hypotensive to 81/54 in ED with minimal improvement following fluid bolus x4 and maintenance fluids  - 100 mg Hydrocortisone IVP given w/ improvement BP to 118/79  - Continue hydrocortisone 50mg IV q8 for now, monitor BP and symptoms for improvement; can decrease hydrocortisone to 50 mg BID with 24 hours clinical and symptomatic improvement   - Must consider hypophysitis; check LH, FSH, IGF-1, ACTH (with AM labs), estradiol, and prolactin  - Will consider pituitary MRI, however patient has issues w/ claustrophobia as per primary team    To help with discharge planning, Please print and give the pt info section for patients under adrenal insufficiency (this will educate the patient regarding the warning signs of adrenal crisis )   -  patient that they should take 2-3x her home dose in cases of illness, fever, accidents, and surgery  - pt should receive stress dosing (hydrocortisone 50mg q8) with any major illness or surgical procedure  - Should pt be unable to tolerate PO and is unable to take hydrocortisone, they will need an emergency injection. Please discharge with a prescription for 100 mg Solu-Cortef Act-O-Vial and the following instructions:  [email or web address]/[email or web address]-[email or web address]-[email or web address]-[email or web address]-[email or web address]-act-[email or web address]-vial-[email or web address]-[email or web address]  - pt should obtain a medical alert bracelet or necklace to inform emergency providers that they have adrenal insufficiency  [email or web address]  - Patient should also monitor BP closely at home    #Hyperthyroidism   - Patient unsure as to cause of hyperthyroidism, denies any active symptoms. Enlarged thyroid on exam.   - TSH 3.07, free thyroxine 0.7 on 8/7  - abnormal TFTs could be due to overtreatment of hyperthyroidism vs hypophysitis i/s/o checkpoint inhibitor therapy  - On methimazole 5mg at home; dose held today.   - hold methimazole for now  - Recheck FT4 on Wednesday, 8/9  - Please order TSH receptor Ab, TSI Ab Patient is a 67y F PMHx HTN, small cell lung cancer (s/p chemo and immunotherapy, now on maintenance Atezolizumab as of Nov 2022) , presenting with lightheadedness for 2 days and intermittent diarrhea. Pt hypotensive at urgent care with BP 81/54 in ED, minimal improvement following multiple fluid boluses, with serum cortisol 0.5, consistent with adrenal insufficiency. Patient is high risk and high level decision making.    #Adrenal Insufficiency - suspect secondary AI vs. hypophysitis (however pt does not have headaches which is typical  #Hypotension - adrenal crisis, now improved with steroids  - Pt with serum cortisol 0.5, diagnostic of adrenal insufficiency, which is a known complication of checkpoint inhibitor immunotherapy. There is not need for cosyntropin testing at this point.  - Hypotensive to 81/54 in ED with minimal improvement following fluid bolus x4 and maintenance fluids  - 100 mg Hydrocortisone IVP given w/ improvement BP to 118/79  - Continue hydrocortisone 50mg IV q6 for now, monitor BP and symptoms for improvement; can decrease hydrocortisone to 50 mg q8h with 24 hours clinical and symptomatic improvement   - Must consider hypophysitis; check LH, FSH, IGF-1, ACTH (with AM labs), estradiol, and prolactin. If LH/FSH are low fits a central picture since pt is menopausal  - Will consider pituitary MRI, however patient has issues w/ claustrophobia as per primary team -> can plan to address as outpatient and treat hormonally as above     To help with discharge planning, Please print and give the pt info section for patients under adrenal insufficiency (this will educate the patient regarding the warning signs of adrenal crisis )   -  patient that they should take 2-3x her home dose in cases of illness, fever, accidents, and surgery  - pt should receive stress dosing (hydrocortisone 50mg q8) with any major illness or surgical procedure  - Should pt be unable to tolerate PO and is unable to take hydrocortisone, they will need an emergency injection. Please discharge with a prescription for 100 mg Solu-Cortef Act-O-Vial and the following instructions:  [email or web address]/[email or web address]-[email or web address]-injection-[email or web address]-[email or web address]-cortef-act-[email or web address]-vial-[email or web address]-chamber-[email or web address]  - pt should obtain a medical alert bracelet or necklace to inform emergency providers that they have adrenal insufficiency  [email or web address]  - Patient should also monitor BP closely at home    #Hyperthyroidism   - Patient unsure as to cause of hyperthyroidism, denies any active symptoms. Enlarged thyroid on exam.   - TSH NORMAL 3.07, free thyroxine LOW at 0.7 on 8/7  - abnormal TFTs could be due to overtreatment of hyperthyroidism (although would suspect TSH to be high), nonthyroidal illness,  vs hypophysitis i/s/o checkpoint inhibitor therapy with low FT4 from central process  - On methimazole 5mg at home; dose held today.   - hold methimazole for now  - Recheck FT4 on Wednesday, 8/9  - Please order TSH receptor Ab, TSI Ab

## 2023-08-07 NOTE — PATIENT PROFILE ADULT - FALL HARM RISK - CONCLUSION
no chest pain/no orthopnea/no dyspnea on exertion/no paroxysmal nocturnal dyspnea/no peripheral edema/no palpitations/no claudication Universal Safety Interventions

## 2023-08-07 NOTE — H&P ADULT - NSHPPHYSICALEXAM_GEN_ALL_CORE
Vital Signs Last 24 Hrs  T(C): 36.9 (07 Aug 2023 00:35), Max: 37.1 (06 Aug 2023 18:23)  T(F): 98.5 (07 Aug 2023 00:35), Max: 98.7 (06 Aug 2023 18:23)  HR: 93 (07 Aug 2023 01:00) (88 - 112)  BP: 91/60 (07 Aug 2023 01:00) (81/54 - 122/68)  BP(mean): 70 (07 Aug 2023 01:00) (69 - 77)  RR: 20 (07 Aug 2023 01:00) (17 - 20)  SpO2: 100% (07 Aug 2023 01:00) (96% - 100%)    Parameters below as of 07 Aug 2023 01:00  Patient On (Oxygen Delivery Method): nasal cannula  O2 Flow (L/min): 2      CONSTITUTIONAL: Well-groomed, in no apparent distress  EYES: No conjunctival or scleral injection, non-icteric;   ENMT: No external nasal lesions; MMM  NECK: Trachea midline without palpable neck mass; thyroid not enlarged and non-tender  RESPIRATORY: Breathing comfortably; no dullness to percussion; lungs CTA without wheeze/rhonchi/rales  CARDIOVASCULAR: +S1S2, RRR, no M/G/R; pedal pulses full and symmetric; no lower extremity edema  GASTROINTESTINAL: No palpable masses or tenderness, +BS throughout, no rebound/guarding; no hepatosplenomegaly; no hernia palpated  LYMPHATIC: No cervical LAD or tenderness  SKIN: No rashes or ulcers noted  NEUROLOGIC: CN II-XII intact; sensation intact in LEs b/l to light touch  PSYCHIATRIC: A+O x 3; mood and affect appropriate; appropriate insight and judgment Vital Signs Last 24 Hrs  T(C): 36.9 (07 Aug 2023 00:35), Max: 37.1 (06 Aug 2023 18:23)  T(F): 98.5 (07 Aug 2023 00:35), Max: 98.7 (06 Aug 2023 18:23)  HR: 93 (07 Aug 2023 01:00) (88 - 112)  BP: 91/60 (07 Aug 2023 01:00) (81/54 - 122/68)  BP(mean): 70 (07 Aug 2023 01:00) (69 - 77)  RR: 20 (07 Aug 2023 01:00) (17 - 20)  SpO2: 100% (07 Aug 2023 01:00) (96% - 100%)    Parameters below as of 07 Aug 2023 01:00  Patient On (Oxygen Delivery Method): nasal cannula  O2 Flow (L/min): 2      CONSTITUTIONAL: Well-groomed, in no apparent distress  EYES: No conjunctival or scleral injection, non-icteric   ENMT: No external nasal lesions; MMM  RESPIRATORY: on NC, tachypneic but breathing comfortably; lungs CTA without wheeze/rhonchi/rales  CARDIOVASCULAR: tachycardic, +S1S2, RRR, no M/G/R; no lower extremity edema  GASTROINTESTINAL: No palpable masses or tenderness, +BS throughout, no rebound/guarding  LYMPHATIC: No cervical LAD or tenderness  SKIN: No rashes or ulcers noted  NEUROLOGIC: CN II-XII intact; sensation intact in LEs b/l to light touch  PSYCHIATRIC: A+O x 3; mood and affect appropriate; appropriate insight and judgment

## 2023-08-07 NOTE — CONSULT NOTE ADULT - ASSESSMENT
67y Female  with PMH of extensive stage small cell lung cancer on maintenance immunotherapy ( w/ atezolizumab , cycle 14 on 7/25)  presenting with lightheadedness , body aches, cough and chills x1, found to be septic.  Admitted for sepsis  Oncology consulted for further evaluation     Brief onc history: Extensive stage small cell lung cancer. Began first line chemo + immunotherapy with Carboplatin/Etoposide/Atezolizumab in August 2022; achieved [place]. Completed 4 cycles of chemo + immunotherapy in Oct 2022 followed by maintenance Atezolizumab as of Nov 2022.  ?    -Infectious workup ongoing   -Thyroid function tests  -MR brain with and without contrast  -IVF hydration   -No plans for inpatient chemotherapy  -C/w Supportive care, pain control, Nutrition, PT, DVT ppx  -Rest of care as per primary team  -Patient to followup with  ([facility]) upon discharge  -Oncology will continue to follow with you    Case d/w oncology attending      Edita SIFUENTES  Oncology Physician Assistant  [facility]    Pager [phone] also available on TEAMS as Edita SIFUENTES    If before 8am/after 5pm or on weekends please page On-call Oncology Fellow   67y Female  with PMH of extensive stage small cell lung cancer on maintenance immunotherapy ( w/ atezolizumab , cycle 14 on 7/25)  presenting with lightheadedness , body aches, cough and chills x1, found to be septic.  Admitted for sepsis  Oncology consulted for further evaluation     Brief onc history: Extensive stage small cell lung cancer. Began first line chemo + immunotherapy with Carboplatin/Etoposide/Atezolizumab in August 2022; achieved [place]. Completed 4 cycles of chemo + immunotherapy in Oct 2022 followed by maintenance Atezolizumab as of Nov 2022.  ?    -Infectious workup ongoing  Patient on C diff precautions  -Thyroid function tests  -IVF hydration   -No plans for inpatient chemotherapy  -C/w Supportive care, pain control, Nutrition, PT, DVT ppx  -Rest of care as per primary team  -Patient to followup with Dr. Chou ([facility]) upon discharge  -Oncology will continue to follow with you    Case d/w oncology attending      Edita SIFUENTES  Oncology Physician Assistant  [facility]    Pager [phone] also available on TEAMS as Edita SIFUENTES    If before 8am/after 5pm or on weekends please page On-call Oncology Fellow   67y Female  with PMH of extensive stage small cell lung cancer on maintenance immunotherapy ( w/ atezolizumab , cycle 14 on 7/25)  presenting with lightheadedness , body aches, cough and chills x1, found to be septic.  Admitted for sepsis and hypotensions   Oncology consulted for further evaluation     Brief onc history: Extensive stage small cell lung cancer. Began first line chemo + immunotherapy with Carboplatin/Etoposide/Atezolizumab in August 2022; achieved [place]. Completed 4 cycles of chemo + immunotherapy in Oct 2022 followed by maintenance Atezolizumab as of Nov 2022.  ?    -Infectious workup ongoing  Patient on C diff precautions, GI PCR and C diff panel pending  -Thyroid function tests  -+ conjunctivitis, consider starting abx eye gtt  -IVF hydration   -No need for inpatient MR brain recently has MRI (stand up mri as outpatient) in June 2023  -No plans for inpatient chemotherapy  -C/w Supportive care, pain control, Nutrition, PT, DVT ppx  -Rest of care as per primary team  -Patient to followup with Dr. Chou ([facility]) upon discharge  -Oncology will continue to follow with you    Case d/w oncology attending      Edita SIFUENTES  Oncology Physician Assistant  [facility]TA [facility]    Pager [phone] also available on TEAMS as Edita SIFUENTES    If before 8am/after 5pm or on weekends please page On-call Oncology Fellow

## 2023-08-07 NOTE — PROGRESS NOTE ADULT - ASSESSMENT
Patient is a 67y F PMHx HTN, Lung CA (on chemo, last 7/22/23), presents with complaints of being lightheaded, body aches, cough and chills x1, found to be septic.

## 2023-08-07 NOTE — CHART NOTE - NSCHARTNOTEFT_GEN_A_CORE
cortisol level 0.5 c/w adrenal insufficiency  pt got a dose of hydrocortisone 100mg iv x1, cont iv hydrocortisone 50mg q6h  endocrine consult emailed, f/u recs cortisol level 0.5 c/w adrenal insufficiency  pt got a dose of hydrocortisone 100mg iv x1, cont iv hydrocortisone 50mg q6h  FT4 low 0.7, TSH 3. will hold methimazole  endocrine consult emailed, f/u recs

## 2023-08-07 NOTE — H&P ADULT - PROBLEM SELECTOR PLAN 3
methimazole 5 Stage IV small cell lung cancer  - being treated by Dr. Sánchez Chou   - Began first line chemo + immunotherapy with Carboplatin/Etoposide/Atezolizumab in August 2022; achieved [place]. - Completed 4 cycles of chemo + immunotherapy in Oct 2022 followed by maintenance Atezolizumab as of Nov 2022.  - Restaging PET/CT June 2023 with overall sustained systemic response.   - Oncology consult in the AM

## 2023-08-07 NOTE — H&P ADULT - PROBLEM SELECTOR PLAN 1
Per outpt review, pt bp has ranged from 114/76 -156/89  -could be in the setting of systemic infection or orthostatic  -UCx results pending  -BCx results pending  -s/p vanc and zosyn  -c/w zosyn for empiric coverage Pt meets sepsis criteria with tachycardia and tachypnea with likely GI source vs UTI   Per outpt review, pt bp has ranged from 114/76 -156/89  -CXR negative  -RVP negative   -UCx results pending  -BCx results pending  -stool culture and GI PCR ordered  -s/p vanc and zosyn  -c/w zosyn for empiric coverage Pt meets sepsis criteria with tachycardia and tachypnea with likely GI source vs UTI   Per outpt review, pt bp has ranged from 114/76 -156/89  -CXR negative  -RVP negative   -UCx results pending  -BCx results pending  -stool culture and GI PCR ordered  -s/p vanc and zosyn  monitor off abx  ID consult in the AM Pt meets sepsis criteria with tachycardia and tachypnea with likely GI source vs UTI   Per outpt review, pt bp has ranged from 114/76 -156/89  -CXR negative  -RVP negative   -UCx results pending  -BCx results pending  -stool culture and GI PCR ordered  -s/p vanc and zosyn  -c/w zosyn,

## 2023-08-07 NOTE — ED ADULT NURSE REASSESSMENT NOTE - NS ED NURSE REASSESS COMMENT FT1
Report given to [facility] RN Effia, pt respirations even and unlabored, appears in NAD. stable upon exit
pt started to become nauseous and vomited undigested food, MD at bedside and evaluated pt, zofran ordered
Break coverage RN. Report received from NAYE Jacobo. Patient A&Ox4, respirations even and unlabored. Patient found to be hypotensive. Pt asymptomatic. MD Mahmood made aware. Repeat labs collected and sent to lab. Vancomycin administered per orders. Stretcher in lowest position, wheels locked, appropriate side rails in place, call bell in reach.

## 2023-08-07 NOTE — H&P ADULT - PROBLEM SELECTOR PLAN 2
Stage IV small cell lung cancer  - being treated by Dr. Sánchez Chou   - Began first line chemo + immunotherapy with Carboplatin/Etoposide/Atezolizumab in August 2022; achieved [place]. - Completed 4 cycles of chemo + immunotherapy in Oct 2022 followed by maintenance Atezolizumab as of Nov 2022.  - Restaging PET/CT June 2023 with overall sustained systemic response.   - Oncology consult in the AM Baseline creatinine: 1.4  BUN/Cr upon admission 24/2.16  likely in the setting of sepsis as well as dehydration   -creatinine downtrending  after fluid boluses  -ctm on bmp

## 2023-08-07 NOTE — H&P ADULT - HISTORY OF PRESENT ILLNESS
Patient is a 67y F PMHx HTN, Lung CA (on chemo, last 7/22/23), p/w lightheadedness. Patient with body aches, cough, chills x1 week. Also with decreased PO. Today patient felt lightheaded, went to UC and found to be hypotensive. Denies CP, SOB, nvd, recorded fevers, abdominal pain. Patient is a 67y F PMHx HTN, Lung CA (on chemo, last 7/22/23), p/w lightheadedness for 2 days. Patient reports that she has been having intermittent episodes of diarrhea since 8/3. Her son, whom she lives with, initially experienced diarrhea for approximately a week prior. Pt denies recent travel hx or other events. Pt has also had decreased PO intake since then as well. She presented to urgent care because she felt lightheadeded and was noted to be hypotensive, was instructed to come to hospital. Pt endorses a chronic dry cough. She denies fevers, chest pain, dyspnea, nausea, vomiting, melena/hematochezia, abdominal pain and dysuria.

## 2023-08-07 NOTE — H&P ADULT - PROBLEM SELECTOR PLAN 4
Pt with hx of hypertension  -meds: losartan 100  -hold in the setting of symptomatic hypotension c/w methimazole 5

## 2023-08-07 NOTE — CONSULT NOTE ADULT - SUBJECTIVE AND OBJECTIVE BOX
HPI:  Patient is a 67y F PMHx of hypertensions and small cell Lung CA (tx with  maintenance Atezolizumab as of Nov 2022- last 7/23/2023), she presented on 8/6 lightheadedness for 2 days. Patient reports that she has been having intermittent episodes of diarrhea since 8/3. Her son, whom she lives with, initially experienced diarrhea for approximately a week prior. Pt denies recent travel hx or other events. Pt has also had decreased PO intake since then as well. She presented to urgent care because she felt lightheadeded and was noted to be hypotensive, was instructed to come to hospital. Pt endorses a chronic dry cough. She denies fevers, chest pain, dyspnea, nausea, vomiting, melena/hematochezia, abdominal pain and dysuria.     Her SBP has been 80s -90s.  She denies other symptoms.         (07 Aug 2023 02:21)      PAST MEDICAL & SURGICAL HISTORY:  HTN (hypertension)      Hyperthyroidism      Lung cancer- small cell  Completed 4 cycles of chemo + immunotherapy in Oct 2022 followed by maintenance Atezolizumab as of Nov 2022- last 7/23/2023      History of cholecystectomy  1989          Allergies    Tylenol (Stomach Upset)    Intolerances        ANTIMICROBIALS:  piperacillin/tazobactam IVPB.. 3.375 every 8 hours      OTHER MEDS:  aspirin enteric coated 81 milliGRAM(s) Oral daily  enoxaparin Injectable 40 milliGRAM(s) SubCutaneous every 24 hours  lactated ringers Bolus 1000 milliLiter(s) IV Bolus once  lactated ringers. 1000 milliLiter(s) IV Continuous <Continuous>  methimazole 5 milliGRAM(s) Oral daily      SOCIAL HISTORY:  former smoker  not working   no recent travel  + sick contact    FAMILY HISTORY:  FH: HTN (hypertension) (Mother)        REVIEW OF SYSTEMS  [  ] ROS unobtainable because:    [x  ] All other systems negative except as noted below:	    Constitutional:  [ ] fever [ ] chills  [ ] weight loss  [x ] weakness  Skin:  [ ] rash [ ] phlebitis	  Eyes: [ ] icterus [ ] pain  [ ] discharge	  ENMT: [ ] sore throat  [ ] thrush [ ] ulcers [ ] exudates  Respiratory: [ ] dyspnea [ ] hemoptysis [ ] cough [ ] sputum	  Cardiovascular:  [ ] chest pain [ ] palpitations [ ] edema	  Gastrointestinal:  [ ] nausea [ ] vomiting [ x] diarrhea [ ] constipation [ ] pain	  Genitourinary:  [ ] dysuria [x ] frequency [ ] hematuria [ ] discharge [ ] flank pain  [ ] incontinence  Musculoskeletal:  [ ] myalgias [ ] arthralgias [ ] arthritis  [ ] back pain  Neurological:  [ ] headache [ ] seizures  [ ] confusion/altered mental status  Psychiatric:  [ ] anxiety [ ] depression	  Hematology/Lymphatics:  [ ] lymphadenopathy  Endocrine:  [ ] adrenal [ ] thyroid  Allergic/Immunologic:	 [ ] transplant [ ] seasonal    PHYSICAL EXAM:  General: [x ] non-toxic  HEAD/EYES: [ ] PERRL [ x] white sclera [ ] icterus  ENT:  [ ] normal [ x] supple [ ] thrush [ ] pharyngeal exudate  Cardiovascular:   [ ] murmur [x ] normal [ ] PPM/AICD  Respiratory:  [x ] clear to ausculation bilaterally  GI:  [x ] soft, non-tender, normal bowel sounds  :  [ ] sethi [x ] no CVA tenderness   Musculoskeletal:  [ ] no synovitis  Neurologic:  [ ] non-focal exam   Skin:  [x ] no rash  Lymph: [ x] no lymphadenopathy  Psychiatric:  [ x] appropriate affect [ ] alert & oriented  Lines:  [x ] no phlebitis [ ] central line          Drug Dosing Weight  Height (cm): 160.02 (02 May 2023 11:01)  Weight (kg): 71.3002 (25 Jul 2023 10:01)  BMI (kg/m2): 27.8 (25 Jul 2023 10:01)  BSA (m2): 1.75 (25 Jul 2023 10:01)    Vital Signs Last 24 Hrs  T(F): 98.2 (08-07-23 @ 06:53), Max: 98.7 (08-06-23 @ 18:23)    Vital Signs Last 24 Hrs  HR: 89 (08-07-23 @ 06:53) (83 - 112)  BP: 96/67 (08-07-23 @ 06:53) (81/54 - 122/68)  RR: 20 (08-07-23 @ 06:53)  SpO2: 100% (08-07-23 @ 06:53) (96% - 100%)  Wt(kg): --                          11.8   9.07  )-----------( 265      ( 07 Aug 2023 06:24 )             34.9       08-07    134<L>  |  103  |  18  ----------------------------<  99  4.8   |  22  |  1.71<H>    Ca    9.0      07 Aug 2023 06:24  Phos  4.5     08-07  Mg     1.30     08-07    TPro  6.3  /  Alb  3.1<L>  /  TBili  0.3  /  DBili  x   /  AST  20  /  ALT  10  /  AlkPhos  31<L>  08-07      Urinalysis Basic - ( 07 Aug 2023 06:24 )    Color: x / Appearance: x / SG: x / pH: x  Gluc: 99 mg/dL / Ketone: x  / Bili: x / Urobili: x   Blood: x / Protein: x / Nitrite: x   Leuk Esterase: x / RBC: x / WBC x   Sq Epi: x / Non Sq Epi: x / Bacteria: x        MICROBIOLOGY:  blood and urine culture testing    RADIOLOGY:  < from: Xray Chest 2 Views PA/Lat (08.06.23 @ 18:36) >    ACC: [id number] EXAM:  XR CHEST PA LAT 2V   ORDERED BY: JUSTYNA HERNANDEZ     PROCEDURE DATE:  08/06/2023          INTERPRETATION:  EXAMINATION: XR CHEST PA AND LATERAL    CLINICAL INDICATION: Sepsis    TECHNIQUE: 2 views; Frontal and lateral views of thechest were obtained.    COMPARISON: CT chest 7/29/2022    FINDINGS:    The heart is normal in size.  The lungs are clear. Flattened diaphragms.  There is no pneumothorax or pleural effusion.  No acute bony abnormality.    IMPRESSION:  Hyperinflated lungs. No focal infiltrate.    < end of copied text >     HPI:  Patient is a 67y F PMHx of hypertensions and small cell Lung CA (tx with  maintenance Atezolizumab as of Nov 2022- last 7/23/2023), she presented on 8/6 lightheadedness for 2 days. Patient reports that she has been having intermittent episodes of diarrhea since 8/3. Her son, whom she lives with, initially experienced diarrhea for approximately a week prior. Pt denies recent travel hx or other events. Pt has also had decreased PO intake since then as well. She presented to urgent care because she felt lightheadeded and was noted to be hypotensive, was instructed to come to hospital. Pt endorses a chronic dry cough. She denies fevers, chest pain, dyspnea, nausea, vomiting, melena/hematochezia, abdominal pain and dysuria.     Her SBP has been 80s -90s.  She denies other symptoms.         (07 Aug 2023 02:21)      PAST MEDICAL & SURGICAL HISTORY:  HTN (hypertension)      Hyperthyroidism      Lung cancer- small cell  Completed 4 cycles of chemo + immunotherapy in Oct 2022 followed by maintenance Atezolizumab as of Nov 2022- last 7/23/2023      History of cholecystectomy  1989          Allergies    Tylenol (Stomach Upset)    Intolerances        ANTIMICROBIALS:  piperacillin/tazobactam IVPB.. 3.375 every 8 hours      OTHER MEDS:  aspirin enteric coated 81 milliGRAM(s) Oral daily  enoxaparin Injectable 40 milliGRAM(s) SubCutaneous every 24 hours  lactated ringers Bolus 1000 milliLiter(s) IV Bolus once  lactated ringers. 1000 milliLiter(s) IV Continuous <Continuous>  methimazole 5 milliGRAM(s) Oral daily      SOCIAL HISTORY:  former smoker  not working   no recent travel  + sick contact    FAMILY HISTORY:  FH: HTN (hypertension) (Mother)        REVIEW OF SYSTEMS  [  ] ROS unobtainable because:    [x  ] All other systems negative except as noted below:	    Constitutional:  [ ] fever [ ] chills  [ ] weight loss  [x ] weakness  Skin:  [ ] rash [ ] phlebitis	  Eyes: [ ] icterus [ ] pain  [ ] discharge	  ENMT: [ ] sore throat  [ ] thrush [ ] ulcers [ ] exudates  Respiratory: [ ] dyspnea [ ] hemoptysis [ ] cough [ ] sputum	  Cardiovascular:  [ ] chest pain [ ] palpitations [ ] edema	  Gastrointestinal:  [ ] nausea [ ] vomiting [ x] diarrhea [ ] constipation [ ] pain	  Genitourinary:  [ ] dysuria [x ] frequency [ ] hematuria [ ] discharge [ ] flank pain  [ ] incontinence  Musculoskeletal:  [ ] myalgias [ ] arthralgias [ ] arthritis  [ ] back pain  Neurological:  [ ] headache [ ] seizures  [ ] confusion/altered mental status  Psychiatric:  [ ] anxiety [ ] depression	  Hematology/Lymphatics:  [ ] lymphadenopathy  Endocrine:  [ ] adrenal [ ] thyroid  Allergic/Immunologic:	 [ ] transplant [ ] seasonal    PHYSICAL EXAM:  General: [x ] non-toxic  HEAD/EYES: [ ] PERRL [ x] white sclera [ ] icterus  ENT:  [ ] normal [ x] supple [ ] thrush [ ] pharyngeal exudate  Cardiovascular:   [ ] murmur [x ] normal [ ] PPM/AICD  Respiratory:  [x ] clear to ausculation bilaterally  GI:  [x ] soft, non-tender, normal bowel sounds  :  [ ] sethi [x ] no CVA tenderness   Musculoskeletal:  [ ] no synovitis  Neurologic:  [ ] non-focal exam   Skin:  [x ] no rash  Lymph: [ x] no lymphadenopathy  Psychiatric:  [ x] appropriate affect [ ] alert & oriented  Lines:  [x ] no phlebitis [ ] central line          Drug Dosing Weight  Height (cm): 160.02 (02 May 2023 11:01)  Weight (kg): 71.3002 (25 Jul 2023 10:01)  BMI (kg/m2): 27.8 (25 Jul 2023 10:01)  BSA (m2): 1.75 (25 Jul 2023 10:01)    Vital Signs Last 24 Hrs  T(F): 98.2 (08-07-23 @ 06:53), Max: 98.7 (08-06-23 @ 18:23)    Vital Signs Last 24 Hrs  HR: 89 (08-07-23 @ 06:53) (83 - 112)  BP: 96/67 (08-07-23 @ 06:53) (81/54 - 122/68)  RR: 20 (08-07-23 @ 06:53)  SpO2: 100% (08-07-23 @ 06:53) (96% - 100%)  Wt(kg): --                          11.8   9.07  )-----------( 265      ( 07 Aug 2023 06:24 )             34.9       08-07    134<L>  |  103  |  18  ----------------------------<  99  4.8   |  22  |  1.71<H>    Ca    9.0      07 Aug 2023 06:24  Phos  4.5     08-07  Mg     1.30     08-07    TPro  6.3  /  Alb  3.1<L>  /  TBili  0.3  /  DBili  x   /  AST  20  /  ALT  10  /  AlkPhos  31<L>  08-07      Urinalysis Basic - ( 07 Aug 2023 06:24 )    Color: x / Appearance: x / SG: x / pH: x  Gluc: 99 mg/dL / Ketone: x  / Bili: x / Urobili: x   Blood: x / Protein: x / Nitrite: x   Leuk Esterase: x / RBC: x / WBC x   Sq Epi: x / Non Sq Epi: x / Bacteria: x        MICROBIOLOGY:  blood and urine culture testing    RADIOLOGY:  < from: Xray Chest 2 Views PA/Lat (08.06.23 @ 18:36) >    ACC: [id number] EXAM:  XR CHEST PA LAT 2V   ORDERED BY: JUSTYNA HERNANDEZ     PROCEDURE DATE:  08/06/2023        < from: NM PET/CT Onc FDG Skull to Thigh, Subsq (06.15.23 @ 16:47) >    IMPRESSION:    1. Indeterminate FDG avid lesion in the region of the sella. The   differential diagnosis includes hypophysitis, metastasis, and pituitary   macroadenoma. Recommend laboratory and MRI evaluation.    2. No other FDG avid findings that would suggest residual/recurrent   disease.    < end of copied text >    INTERPRETATION:  EXAMINATION: XR CHEST PA AND LATERAL    CLINICAL INDICATION: Sepsis    TECHNIQUE: 2 views; Frontal and lateral views of thechest were obtained.    COMPARISON: CT chest 7/29/2022    FINDINGS:    The heart is normal in size.  The lungs are clear. Flattened diaphragms.  There is no pneumothorax or pleural effusion.  No acute bony abnormality.    IMPRESSION:  Hyperinflated lungs. No focal infiltrate.    < end of copied text >

## 2023-08-07 NOTE — PROGRESS NOTE ADULT - SUBJECTIVE AND OBJECTIVE BOX
Dr. Katya Novak  Pager [phone]    PROGRESS NOTE:     Patient is a 67y old  Female who presents with a chief complaint of sepsis (07 Aug 2023 10:01)      SUBJECTIVE / OVERNIGHT EVENTS: pt reports loose bm this am, slight cough, no sputum, went to urgent care for conjunctivitis  ADDITIONAL REVIEW OF SYSTEMS: denies chest pain/sob/dizziness, bp soft, getting IVF     MEDICATIONS  (STANDING):  aspirin enteric coated 81 milliGRAM(s) Oral daily  enoxaparin Injectable 40 milliGRAM(s) SubCutaneous every 24 hours  lactated ringers. 1000 milliLiter(s) (75 mL/Hr) IV Continuous <Continuous>  methimazole 5 milliGRAM(s) Oral daily  piperacillin/tazobactam IVPB.. 3.375 Gram(s) IV Intermittent every 8 hours  tobramycin 0.3% Ointment 1 Application(s) Left EYE every 6 hours    MEDICATIONS  (PRN):      CAPILLARY BLOOD GLUCOSE        I&O's Summary      PHYSICAL EXAM:  Vital Signs Last 24 Hrs  T(C): 36.7 (07 Aug 2023 12:22), Max: 37.1 (06 Aug 2023 18:23)  T(F): 98 (07 Aug 2023 12:22), Max: 98.7 (06 Aug 2023 18:23)  HR: 92 (07 Aug 2023 12:22) (83 - 112)  BP: 92/56 (07 Aug 2023 12:22) (81/54 - 122/68)  BP(mean): 72 (07 Aug 2023 02:30) (69 - 77)  RR: 18 (07 Aug 2023 12:22) (17 - 20)  SpO2: 100% (07 Aug 2023 12:22) (96% - 100%)    Parameters below as of 07 Aug 2023 12:22  Patient On (Oxygen Delivery Method): nasal cannula  O2 Flow (L/min): 2    CONSTITUTIONAL: NAD, well-developed  Eye: conjunctivitis left eye  RESPIRATORY: Normal respiratory effort; lungs are clear to auscultation bilaterally  CARDIOVASCULAR: Regular rate and rhythm, normal S1 and S2, no murmur/rub/gallop; No lower extremity edema; Peripheral pulses are 2+ bilaterally  ABDOMEN: Nontender to palpation, normoactive bowel sounds, no rebound/guarding; No hepatosplenomegaly  MUSCULOSKELETAL: no clubbing or cyanosis of digits; no joint swelling or tenderness to palpation  PSYCH: A+O to person, place, and time; affect appropriate    LABS:                        11.8   9.07  )-----------( 265      ( 07 Aug 2023 06:24 )             34.9     08-07    134<L>  |  103  |  18  ----------------------------<  99  4.8   |  22  |  1.71<H>    Ca    9.0      07 Aug 2023 06:24  Phos  4.5     08-07  Mg     1.30     08-07    TPro  6.3  /  Alb  3.1<L>  /  TBili  0.3  /  DBili  x   /  AST  20  /  ALT  10  /  AlkPhos  31<L>  08-07    PT/INR - ( 06 Aug 2023 17:30 )   PT: 12.5 sec;   INR: 1.12 ratio         PTT - ( 06 Aug 2023 17:30 )  PTT:30.6 sec      Urinalysis Basic - ( 07 Aug 2023 06:24 )    Color: x / Appearance: x / SG: x / pH: x  Gluc: 99 mg/dL / Ketone: x  / Bili: x / Urobili: x   Blood: x / Protein: x / Nitrite: x   Leuk Esterase: x / RBC: x / WBC x   Sq Epi: x / Non Sq Epi: x / Bacteria: x    RADIOLOGY & ADDITIONAL TESTS:  Results Reviewed:   Imaging Personally Reviewed:  < from: Xray Chest 2 Views PA/Lat (08.06.23 @ 18:36) >  IMPRESSION:  Hyperinflated lungs. No focal infiltrate.      Electrocardiogram Personally Reviewed:    COORDINATION OF CARE:  Care Discussed with Consultants/Other Providers [Y/N]:  Prior or Outpatient Records Reviewed [Y/N]:

## 2023-08-07 NOTE — CONSULT NOTE ADULT - SUBJECTIVE AND OBJECTIVE BOX
Patient is a 67y old  Female who presents with a chief complaint of     HPI:  Patient is a 67y F PMHx HTN, extensive stage small cell Lung CA (on chemo, last 7/22/23), p/w lightheadedness for 2 days. Patient reports that she has been having intermittent episodes of diarrhea since 8/3. Her son, whom she lives with, initially experienced diarrhea for approximately a week prior. Pt denies recent travel hx or other events. Pt has also had decreased PO intake since then as well. She presented to urgent care because she felt lightheadeded and was noted to be hypotensive, was instructed to come to hospital. Pt endorses a chronic dry cough. She denies fevers, chest pain, dyspnea, nausea, vomiting, melena/hematochezia, abdominal pain and dysuria.      (07 Aug 2023 02:21)       Oncologic History:  67F with extensive smoking history, who quit in July 2022, noted weight loss of about 25 pounds over the past 7-8 months. She does report a history of hyperthyroidism and attributed the weight loss to that at first. In late May 2022, her family noted neck swelling. She saw her PCP and was sent for a thyroid ultrasound which was unremarkable per the patient. Due to the weight loss, she was sent for a CXR that was abnormal. This led to a CT chest performed in early July 2022 revealing a large right hilar/mediastinal mass measuring up to 7 cm with bilateral pulmonary nodules. She was referred to thoracic surgery. She underwent EBUS/bronchoscopy in late July 2022 with biopsy of the RUL and pretracheal lymph node revealing small cell carcinoma.     Initial staging CT revealed an 11cm partially calcified left adnexal mass felt to represent an exophytic fibroid. She was felt to have extensive stage disease based on the lung nodules felt to represent metastases. Began first line Carbo/Etoposide/Atezolizumab in August 2022; achieved NM. Completed 4 cycles of chemo + immunotherapy in Oct 2022 followed by maintenance Atezolizumab as of Nov 2022.  ?  ?        Disease: Small Cell Carcinoma    Pathology: - Lung, RUL biopsy and pretracheal EBUS guided FNA 7/29/2022: Positive for malignant cells. Small cell carcinoma.    TNM stage: T4, N2, M1a  AJCC Stage: CORY        Interval History: Completed 4 cycles of chemo + immunotherapy in Oct 2022 followed by maintenance Atezolizumab as of Nov 2022. She is seen today prior to continued treatment with maintenance Atezolizumab.  ?  She reports tolerating therapy. Denies F/C/N/V/D.  ?  Went for open brain MRI in June 2023 that was negative for metastatic disease. There is note of a pituitary gland lesion suspected to represent an adenoma.  ?  She still radiation oncology for evaluation of consolidative thoracic RT which was deferred due to her dramatic response. Patient also preferred to undergo surveillance brain MRIs rather than receive PCI.       ROS: as above     PAST MEDICAL & SURGICAL HISTORY:  HTN (hypertension)      Hyperthyroidism      Lung cancer      History of cholecystectomy  1989          SOCIAL HISTORY:    FAMILY HISTORY:  FH: HTN (hypertension) (Mother)        MEDICATIONS  (STANDING):  aspirin enteric coated 81 milliGRAM(s) Oral daily  enoxaparin Injectable 40 milliGRAM(s) SubCutaneous every 24 hours  lactated ringers. 1000 milliLiter(s) (75 mL/Hr) IV Continuous <Continuous>  magnesium sulfate  IVPB 4 Gram(s) IV Intermittent once  methimazole 5 milliGRAM(s) Oral daily  piperacillin/tazobactam IVPB.. 3.375 Gram(s) IV Intermittent every 8 hours    MEDICATIONS  (PRN):      Allergies    Tylenol (Stomach Upset)    Intolerances        Vital Signs Last 24 Hrs  T(C): 36.8 (07 Aug 2023 06:53), Max: 37.1 (06 Aug 2023 18:23)  T(F): 98.2 (07 Aug 2023 06:53), Max: 98.7 (06 Aug 2023 18:23)  HR: 89 (07 Aug 2023 06:53) (83 - 112)  BP: 96/67 (07 Aug 2023 06:53) (81/54 - 122/68)  BP(mean): 72 (07 Aug 2023 02:30) (69 - 77)  RR: 20 (07 Aug 2023 06:53) (17 - 20)  SpO2: 100% (07 Aug 2023 06:53) (96% - 100%)    Parameters below as of 07 Aug 2023 06:53  Patient On (Oxygen Delivery Method): nasal cannula  O2 Flow (L/min): 2      PHYSICAL EXAM  General: adult in NAD  HEENT: clear oropharynx, anicteric sclera, pink conjunctiva  Neck: supple  CV: normal S1/S2 with no murmur rubs or gallops  Lungs: positive air movement b/l ant lungs, clear to auscultation, no wheezes, no rales  Abdomen: soft non-tender non-distended, no hepatosplenomegaly  Ext: no clubbing cyanosis or edema  Skin: no rashes and no petechiae  Neuro: alert and oriented X 3, none focal    LABS:                          11.8   9.07  )-----------( 265      ( 07 Aug 2023 06:24 )             34.9         Mean Cell Volume : 92.3 fL  Mean Cell Hemoglobin : 31.2 pg  Mean Cell Hemoglobin Concentration : 33.8 gm/dL  Auto Neutrophil # : 5.12 K/uL  Auto Lymphocyte # : 0.94 K/uL  Auto Monocyte # : 1.26 K/uL  Auto Eosinophil # : 0.24 K/uL  Auto Basophil # : 0.08 K/uL  Auto Neutrophil % : 53.9 %  Auto Lymphocyte % : 10.4 %  Auto Monocyte % : 13.9 %  Auto Eosinophil % : 2.6 %  Auto Basophil % : 0.9 %      Serial CBC's  08-07 @ 06:24  Hct-34.9 / Hgb-11.8 / Plat-265 / RBC-3.78 / WBC-9.07  Serial CBC's  08-06 @ 17:30  Hct-40.1 / Hgb-13.5 / Plat-330 / RBC-4.41 / WBC-10.92      08-07    134<L>  |  103  |  18  ----------------------------<  99  4.8   |  22  |  1.71<H>    Ca    9.0      07 Aug 2023 06:24  Phos  4.5     08-07  Mg     1.30     08-07    TPro  6.3  /  Alb  3.1<L>  /  TBili  0.3  /  DBili  x   /  AST  20  /  ALT  10  /  AlkPhos  31<L>  08-07      PT/INR - ( 06 Aug 2023 17:30 )   PT: 12.5 sec;   INR: 1.12 ratio         PTT - ( 06 Aug 2023 17:30 )  PTT:30.6 sec                RADIOLOGY & ADDITIONAL STUDIES:     Patient is a 67y old  Female who presents with a chief complaint of     HPI:  Patient is a 67y F PMHx HTN, extensive stage small cell Lung CA (on chemo, last 7/22/23), p/w lightheadedness for 2 days. Patient reports that she has been having intermittent episodes of diarrhea since 8/3. Her son, whom she lives with, initially experienced diarrhea for approximately a week prior. Pt denies recent travel hx or other events. Pt has also had decreased PO intake since then as well. She presented to urgent care because she felt lightheadeded and was noted to be hypotensive, was instructed to come to hospital. Pt endorses a chronic dry cough. She denies fevers, chest pain, dyspnea, nausea, vomiting, melena/hematochezia, abdominal pain and dysuria.     Patient seen by bedside on 8/7  Patient denying any light-headedness  Feels like she might just have a viral stomach bug  Has sick contacts, son recently had gastroenteritis  Had fever chills and diarrhea x 1 day. One episode of diarrhea  Denies abdominal pain  Has conjunctivitis, red eye, teary and discharge.  seen at urgent care yesterday- was unable  to get her scripr  Feeling otherwise ok  Aware that her BP was low on admission       (07 Aug 2023 02:21)       Oncologic History:  67F with extensive smoking history, who quit in July 2022, noted weight loss of about 25 pounds over the past 7-8 months. She does report a history of hyperthyroidism and attributed the weight loss to that at first. In late May 2022, her family noted neck swelling. She saw her PCP and was sent for a thyroid ultrasound which was unremarkable per the patient. Due to the weight loss, she was sent for a CXR that was abnormal. This led to a CT chest performed in early July 2022 revealing a large right hilar/mediastinal mass measuring up to 7 cm with bilateral pulmonary nodules. She was referred to thoracic surgery. She underwent EBUS/bronchoscopy in late July 2022 with biopsy of the RUL and pretracheal lymph node revealing small cell carcinoma.     Initial staging CT revealed an 11cm partially calcified left adnexal mass felt to represent an exophytic fibroid. She was felt to have extensive stage disease based on the lung nodules felt to represent metastases. Began first line Carbo/Etoposide/Atezolizumab in August 2022; achieved RI. Completed 4 cycles of chemo + immunotherapy in Oct 2022 followed by maintenance Atezolizumab as of Nov 2022.  ?  ?        Disease: Small Cell Carcinoma    Pathology: - Lung, RUL biopsy and pretracheal EBUS guided FNA 7/29/2022: Positive for malignant cells. Small cell carcinoma.    TNM stage: T4, N2, M1a  AJCC Stage: CORY        Interval History: Completed 4 cycles of chemo + immunotherapy in Oct 2022 followed by maintenance Atezolizumab as of Nov 2022. She is seen today prior to continued treatment with maintenance Atezolizumab.  ?  She reports tolerating therapy. Denies F/C/N/V/D.  ?  Went for open brain MRI in June 2023 that was negative for metastatic disease. There is note of a pituitary gland lesion suspected to represent an adenoma.  ?  She still radiation oncology for evaluation of consolidative thoracic RT which was deferred due to her dramatic response. Patient also preferred to undergo surveillance brain MRIs rather than receive PCI.       ROS: as above     PAST MEDICAL & SURGICAL HISTORY:  HTN (hypertension)      Hyperthyroidism      Lung cancer      History of cholecystectomy  1989          SOCIAL HISTORY:    FAMILY HISTORY:  FH: HTN (hypertension) (Mother)        MEDICATIONS  (STANDING):  aspirin enteric coated 81 milliGRAM(s) Oral daily  enoxaparin Injectable 40 milliGRAM(s) SubCutaneous every 24 hours  lactated ringers. 1000 milliLiter(s) (75 mL/Hr) IV Continuous <Continuous>  magnesium sulfate  IVPB 4 Gram(s) IV Intermittent once  methimazole 5 milliGRAM(s) Oral daily  piperacillin/tazobactam IVPB.. 3.375 Gram(s) IV Intermittent every 8 hours    MEDICATIONS  (PRN):      Allergies    Tylenol (Stomach Upset)    Intolerances        Vital Signs Last 24 Hrs  T(C): 36.8 (07 Aug 2023 06:53), Max: 37.1 (06 Aug 2023 18:23)  T(F): 98.2 (07 Aug 2023 06:53), Max: 98.7 (06 Aug 2023 18:23)  HR: 89 (07 Aug 2023 06:53) (83 - 112)  BP: 96/67 (07 Aug 2023 06:53) (81/54 - 122/68)  BP(mean): 72 (07 Aug 2023 02:30) (69 - 77)  RR: 20 (07 Aug 2023 06:53) (17 - 20)  SpO2: 100% (07 Aug 2023 06:53) (96% - 100%)    Parameters below as of 07 Aug 2023 06:53  Patient On (Oxygen Delivery Method): nasal cannula  O2 Flow (L/min): 2      PHYSICAL EXAM  General: adult in NAD  HEENT: clear oropharynx, anicteric sclera, pink conjunctiva  Neck: supple  CV: normal S1/S2 with no murmur rubs or gallops  Lungs: positive air movement b/l ant lungs, clear to auscultation, no wheezes, no rales  Abdomen: soft non-tender non-distended, no hepatosplenomegaly  Ext: no clubbing cyanosis or edema  Skin: no rashes and no petechiae  Neuro: alert and oriented X 3, none focal    LABS:                          11.8   9.07  )-----------( 265      ( 07 Aug 2023 06:24 )             34.9         Mean Cell Volume : 92.3 fL  Mean Cell Hemoglobin : 31.2 pg  Mean Cell Hemoglobin Concentration : 33.8 gm/dL  Auto Neutrophil # : 5.12 K/uL  Auto Lymphocyte # : 0.94 K/uL  Auto Monocyte # : 1.26 K/uL  Auto Eosinophil # : 0.24 K/uL  Auto Basophil # : 0.08 K/uL  Auto Neutrophil % : 53.9 %  Auto Lymphocyte % : 10.4 %  Auto Monocyte % : 13.9 %  Auto Eosinophil % : 2.6 %  Auto Basophil % : 0.9 %      Serial CBC's  08-07 @ 06:24  Hct-34.9 / Hgb-11.8 / Plat-265 / RBC-3.78 / WBC-9.07  Serial CBC's  08-06 @ 17:30  Hct-40.1 / Hgb-13.5 / Plat-330 / RBC-4.41 / WBC-10.92      08-07    134<L>  |  103  |  18  ----------------------------<  99  4.8   |  22  |  1.71<H>    Ca    9.0      07 Aug 2023 06:24  Phos  4.5     08-07  Mg     1.30     08-07    TPro  6.3  /  Alb  3.1<L>  /  TBili  0.3  /  DBili  x   /  AST  20  /  ALT  10  /  AlkPhos  31<L>  08-07      PT/INR - ( 06 Aug 2023 17:30 )   PT: 12.5 sec;   INR: 1.12 ratio         PTT - ( 06 Aug 2023 17:30 )  PTT:30.6 sec                RADIOLOGY & ADDITIONAL STUDIES:

## 2023-08-07 NOTE — CONSULT NOTE ADULT - SUBJECTIVE AND OBJECTIVE BOX
MICU Consult    HPI: 70 year old woman with history of small cell lung cancer on active chemo (last July 2023), HTN, hyperthyroidism presenting to the hospital from urgent care for hypotension. Per patient for the past week she has been having intermittent episodes of diarrhea. Endorsed to have not been everyday and when she had it was only 2 episodes on those days. Also endorsed that during this time despite being able to still drink fluids her solid food intake had been reduced. No endorsed nausea or vomiting, and did endorse that she has found she has been urinating somewhat more frequently. No endorsed burning or pain with urination. No bleed in her urine or in her stools. Endorsed that her son had also had similar diarrheal illness prior to her. Mild cough sometimes with sputum endorsed.  No recorded fevers, but did endorse occasional chills, body aches. She took a home COVID test, which was negative. One episode where she was bending over to tie her shoe and upon standing up did have brief lightheadedness. This prompted her to go to urgent care for further evaluation where she was found to have a low blood pressure.    In the ED, found to be 80s systolic and tachycardic to 100s, afebrile. CXR appeared clear, no anemia, SIRI to 2.16 (prior records indicate range between 1.0-1.2 on prior admissions), hyponatremic to low 130s with acidosis present on VBG and initially elevated lactate and moderate leuk esterase and 6 WBCs on her UA. At time of writing had received doses of vanc/zosyn, zofran and 3L IVF with 4th ordered and systolics still running in the 90s. MICU consulted for hypotension.    PAST MEDICAL & SURGICAL HISTORY:  HTN (hypertension)      Hyperthyroidism      Lung mass      History of cholecystectomy  1989          FAMILY HISTORY:  FH: HTN (hypertension) (Mother)        SOCIAL HISTORY:  Smoking: Former, 1/2 pack per day for 41 years  EtOH Use: Social      Allergies    Tylenol (Stomach Upset)    Intolerances        HOME MEDICATIONS: Losartan 100mg qd, methimazole 5mg qd, ASA 81mg qd    REVIEW OF SYSTEMS:  Constitutional/General: ( ) Acute distress   Eyes: ( ) Changes in vision, ( ) double vision, ( ) blurry vision  ENT: (X) Nasal congestion or rhinorrhea, ( ) changes in hearing, ( ) odynophagia or dysphagia   Skin: ( ) Rashes  Cardiovascular: ( ) Chest pain, ( ) heart palpitations, ( ) orthopnea/PND  Pulmonary: ( ) Shortness of breath, (X) cough, ( ) pleuritic chest pain, ( ) hemoptysis   Gastrointestinal: ( ) Nausea, ( ) vomiting, ( ) diarrhea, ( ) bloating, ( ) constipation, ( ) abdominal pain  Genitourinary: ( ) Dysuria, ( ) frequency, ( ) change in urine odor/appearance   Musculoskeletal: ( ) Changes in strength, ( ) joint tenderness or swelling  Neurologic: ( ) Changes in memory, ( ) headache, ( ) weakness, ( ) paresthesias, ( ) imbalance   Endocrine: ( ) Heat/cold intolerance, ( ) weight change, ( ) excessive sweating, ( ) polydipsia/polyuria  Psychology: ( ) Changes in mood, ( ) anxiety, ( ) depression.  Heme/Lymph: ( ) Easy bruising  [X] All other systems negative  [ ] Unable to assess ROS because ________    OBJECTIVE:  ICU Vital Signs Last 24 Hrs  T(C): 36.9 (07 Aug 2023 00:35), Max: 37.1 (06 Aug 2023 18:23)  T(F): 98.5 (07 Aug 2023 00:35), Max: 98.7 (06 Aug 2023 18:23)  HR: 93 (07 Aug 2023 01:00) (88 - 112)  BP: 91/60 (07 Aug 2023 01:00) (81/54 - 122/68)  BP(mean): 70 (07 Aug 2023 01:00) (69 - 77)  ABP: --  ABP(mean): --  RR: 20 (07 Aug 2023 01:00) (17 - 20)  SpO2: 100% (07 Aug 2023 01:00) (96% - 100%)    O2 Parameters below as of 07 Aug 2023 01:00  Patient On (Oxygen Delivery Method): nasal cannula  O2 Flow (L/min): 2            CAPILLARY BLOOD GLUCOSE          PHYSICAL EXAM:  CONSTITUTIONAL: NAD  HEENT: Mildly moist oral mucosa, nasal cannula in place  RESPIRATORY: Normal respiratory effort, lungs are clear to auscultation bilaterally  CARDIOVASCULAR: Regular rate and rhythm, normal S1 and S2, no murmur/rub/gallop, no lower extremity edema, peripheral pulses are 2+ bilaterally  ABDOMEN: Soft, nondistended, nontender to palpation, normoactive bowel sounds, no rebound/guarding  PSYCH: A+O to person, place, and time  NEUROLOGY: Facial expression symmetric, no gross sensory deficits appreciated, moves all extremities spontaneously  SKIN: No rashes, no palpable lesions     HOSPITAL MEDICATIONS:  MEDICATIONS  (STANDING):    MEDICATIONS  (PRN):      LABS:                        13.5   10.92 )-----------( 330      ( 06 Aug 2023 17:30 )             40.1     08-07    133<L>  |  101  |  21  ----------------------------<  123<H>  4.4   |  21<L>  |  1.68<H>    Ca    8.8      07 Aug 2023 00:45    TPro  7.5  /  Alb  3.8  /  TBili  0.4  /  DBili  x   /  AST  24  /  ALT  11  /  AlkPhos  39<L>  08-06    PT/INR - ( 06 Aug 2023 17:30 )   PT: 12.5 sec;   INR: 1.12 ratio         PTT - ( 06 Aug 2023 17:30 )  PTT:30.6 sec  Urinalysis Basic - ( 07 Aug 2023 00:45 )    Color: x / Appearance: x / SG: x / pH: x  Gluc: 123 mg/dL / Ketone: x  / Bili: x / Urobili: x   Blood: x / Protein: x / Nitrite: x   Leuk Esterase: x / RBC: x / WBC x   Sq Epi: x / Non Sq Epi: x / Bacteria: x        Venous Blood Gas:  08-07 @ 00:45  7.22/52/38/21/62.1  VBG Lactate: 1.5  Venous Blood Gas:  08-06 @ 21:40  7.19/50/55/19/81.6  VBG Lactate: 2.9  Venous Blood Gas:  08-06 @ 18:25  7.27/45/42/21/68.5  VBG Lactate: 1.4      MICROBIOLOGY:     RADIOLOGY:  < from: Xray Chest 2 Views PA/Lat (08.06.23 @ 18:36) >  ACC: [id number] EXAM:  XR CHEST PA LAT 2V   ORDERED BY: JUSTYNA HERNANDEZ     PROCEDURE DATE:  08/06/2023    ******PRELIMINARY REPORT******      ******PRELIMINARY REPORT******           INTERPRETATION:  Clear lungs.        ******PRELIMINARY REPORT******      ******PRELIMINARY REPORT******       < end of copied text >

## 2023-08-07 NOTE — H&P ADULT - NSHPLABSRESULTS_GEN_ALL_CORE
LABS:                          13.5   10.92 )-----------( 330      ( 06 Aug 2023 17:30 )             40.1     08-07    133<L>  |  101  |  21  ----------------------------<  123<H>  4.4   |  21<L>  |  1.68<H>    Ca    8.8      07 Aug 2023 00:45    TPro  7.5  /  Alb  3.8  /  TBili  0.4  /  DBili  x   /  AST  24  /  ALT  11  /  AlkPhos  39<L>  08-06    LIVER FUNCTIONS - ( 06 Aug 2023 17:30 )  Alb: 3.8 g/dL / Pro: 7.5 g/dL / ALK PHOS: 39 U/L / ALT: 11 U/L / AST: 24 U/L / GGT: x           PT/INR - ( 06 Aug 2023 17:30 )   PT: 12.5 sec;   INR: 1.12 ratio         PTT - ( 06 Aug 2023 17:30 )  PTT:30.6 sec  Urinalysis Basic - ( 07 Aug 2023 00:45 )    Color: x / Appearance: x / SG: x / pH: x  Gluc: 123 mg/dL / Ketone: x  / Bili: x / Urobili: x   Blood: x / Protein: x / Nitrite: x   Leuk Esterase: x / RBC: x / WBC x   Sq Epi: x / Non Sq Epi: x / Bacteria: x

## 2023-08-07 NOTE — CONSULT NOTE ADULT - ASSESSMENT
67 year old with history of small cell lung cancer on immunotherpay presents with a few days of feeling lightheaded and associated diarrhea.  Recent sick contact.   Hypotensive in ED.    1) Hypotension  UA with minimal pyuria not suggestive of a UTI  RVP negative  CXR without focal consolidation    Can continue zosyn pending blood cultures and GI pcr    Continue to consider noninfectious etiology  Check ECHO    2) Diarrhea  Check GI pcr  Check stool for C diff  On zosyn pending stool PCR and blood cultures    Atezolizumab has been associated with colitis and diarrhea    3) Pituitary adenoma  ? rule out adrenal insufficiency  Atezolizumab has been associated with adrenal insufficiency

## 2023-08-07 NOTE — H&P ADULT - NSHPREVIEWOFSYSTEMS_GEN_ALL_CORE
REVIEW OF SYSTEMS:    CONSTITUTIONAL:  +malaise, body aches  EYES/ENT: No visual changes;  No vertigo or throat pain   NECK: No pain or stiffness  RESPIRATORY: + cough, no wheezing, hemoptysis; No shortness of breath  CARDIOVASCULAR: No chest pain or palpitations  GASTROINTESTINAL: No abdominal or epigastric pain. No nausea, vomiting, or hematemesis; No diarrhea or constipation. No melena or hematochezia.  GENITOURINARY: No dysuria, frequency or hematuria  NEUROLOGICAL: +lightheadedness  SKIN: No itching, rashes

## 2023-08-07 NOTE — H&P ADULT - ASSESSMENT
Patient is a 67y F PMHx HTN, Lung CA (on chemo, last 7/22/23), presents with complaints of being lightheaded, body aches, cough and chills x1, found to be hypotensive.  Patient is a 67y F PMHx HTN, Lung CA (on chemo, last 7/22/23), presents with complaints of being lightheaded, body aches, cough and chills x1, found to be septic.

## 2023-08-07 NOTE — PROGRESS NOTE ADULT - PROBLEM SELECTOR PLAN 2
Baseline creatinine: 1.4  BUN/Cr upon admission 24/2.16  likely in the setting of sepsis , hypotension, as well as dehydration   -creatinine downtrending  after fluid boluses  -ctm on bmp Baseline creatinine: 1.4  BUN/Cr upon admission 24/2.16  immunotherapy induced interstitial nephritis is also possible  leola can also be in the setting of sepsis , hypotension, as well as dehydration   -creatinine downtrending  after fluid boluses  - replete Mg  -ctm on bmp

## 2023-08-08 LAB
ACTH SER-ACNC: 1.8 PG/ML — LOW (ref 7.2–63.3)
ALBUMIN SERPL ELPH-MCNC: 3.5 G/DL — SIGNIFICANT CHANGE UP (ref 3.3–5)
ALP SERPL-CCNC: 33 U/L — LOW (ref 40–120)
ALT FLD-CCNC: 10 U/L — SIGNIFICANT CHANGE UP (ref 4–33)
ANION GAP SERPL CALC-SCNC: 10 MMOL/L — SIGNIFICANT CHANGE UP (ref 7–14)
AST SERPL-CCNC: 18 U/L — SIGNIFICANT CHANGE UP (ref 4–32)
BILIRUB SERPL-MCNC: 0.3 MG/DL — SIGNIFICANT CHANGE UP (ref 0.2–1.2)
BUN SERPL-MCNC: 14 MG/DL — SIGNIFICANT CHANGE UP (ref 7–23)
CALCIUM SERPL-MCNC: 9.5 MG/DL — SIGNIFICANT CHANGE UP (ref 8.4–10.5)
CHLORIDE SERPL-SCNC: 102 MMOL/L — SIGNIFICANT CHANGE UP (ref 98–107)
CO2 SERPL-SCNC: 24 MMOL/L — SIGNIFICANT CHANGE UP (ref 22–31)
CORTIS AM PEAK SERPL-MCNC: 162.9 UG/DL — HIGH (ref 6–18.4)
CREAT SERPL-MCNC: 1.43 MG/DL — HIGH (ref 0.5–1.3)
CULTURE RESULTS: SIGNIFICANT CHANGE UP
EGFR: 41 ML/MIN/1.73M2 — LOW
ESTRADIOL FREE SERPL-MCNC: 33 PG/ML — SIGNIFICANT CHANGE UP
FSH SERPL-MCNC: 20.3 IU/L — SIGNIFICANT CHANGE UP
GLUCOSE SERPL-MCNC: 182 MG/DL — HIGH (ref 70–99)
HCT VFR BLD CALC: 34.9 % — SIGNIFICANT CHANGE UP (ref 34.5–45)
HGB BLD-MCNC: 11.5 G/DL — SIGNIFICANT CHANGE UP (ref 11.5–15.5)
INSULIN-LIKE GROWTH FACTOR 1 INTERPRETATION: SIGNIFICANT CHANGE UP
INSULIN-LIKE GROWTH FACTOR 1: 58 NG/ML — SIGNIFICANT CHANGE UP (ref 34–241)
LH SERPL-ACNC: 23.7 IU/L — SIGNIFICANT CHANGE UP
MAGNESIUM SERPL-MCNC: 1.9 MG/DL — SIGNIFICANT CHANGE UP (ref 1.6–2.6)
MCHC RBC-ENTMCNC: 30.3 PG — SIGNIFICANT CHANGE UP (ref 27–34)
MCHC RBC-ENTMCNC: 33 GM/DL — SIGNIFICANT CHANGE UP (ref 32–36)
MCV RBC AUTO: 92.1 FL — SIGNIFICANT CHANGE UP (ref 80–100)
NRBC # BLD: 0 /100 WBCS — SIGNIFICANT CHANGE UP (ref 0–0)
NRBC # FLD: 0 K/UL — SIGNIFICANT CHANGE UP (ref 0–0)
PHOSPHATE SERPL-MCNC: 3.7 MG/DL — SIGNIFICANT CHANGE UP (ref 2.5–4.5)
PLATELET # BLD AUTO: 285 K/UL — SIGNIFICANT CHANGE UP (ref 150–400)
POTASSIUM SERPL-MCNC: 4.8 MMOL/L — SIGNIFICANT CHANGE UP (ref 3.5–5.3)
POTASSIUM SERPL-SCNC: 4.8 MMOL/L — SIGNIFICANT CHANGE UP (ref 3.5–5.3)
PROLACTIN SERPL-MCNC: 25.7 NG/ML — HIGH (ref 3.4–24.1)
PROT SERPL-MCNC: 6.8 G/DL — SIGNIFICANT CHANGE UP (ref 6–8.3)
RBC # BLD: 3.79 M/UL — LOW (ref 3.8–5.2)
RBC # FLD: 13.1 % — SIGNIFICANT CHANGE UP (ref 10.3–14.5)
SODIUM SERPL-SCNC: 136 MMOL/L — SIGNIFICANT CHANGE UP (ref 135–145)
SPECIMEN SOURCE: SIGNIFICANT CHANGE UP
T4 FREE SERPL-MCNC: 0.7 NG/DL — LOW (ref 0.9–1.8)
TSH SERPL-MCNC: 0.86 UIU/ML — SIGNIFICANT CHANGE UP (ref 0.27–4.2)
WBC # BLD: 12.54 K/UL — HIGH (ref 3.8–10.5)
WBC # FLD AUTO: 12.54 K/UL — HIGH (ref 3.8–10.5)

## 2023-08-08 PROCEDURE — 99232 SBSQ HOSP IP/OBS MODERATE 35: CPT

## 2023-08-08 PROCEDURE — 93306 TTE W/DOPPLER COMPLETE: CPT | Mod: 26

## 2023-08-08 PROCEDURE — 99233 SBSQ HOSP IP/OBS HIGH 50: CPT

## 2023-08-08 RX ORDER — HYDROCORTISONE 20 MG
50 TABLET ORAL EVERY 8 HOURS
Refills: 0 | Status: DISCONTINUED | OUTPATIENT
Start: 2023-08-08 | End: 2023-08-09

## 2023-08-08 RX ADMIN — ENOXAPARIN SODIUM 40 MILLIGRAM(S): 100 INJECTION SUBCUTANEOUS at 15:18

## 2023-08-08 RX ADMIN — PIPERACILLIN AND TAZOBACTAM 25 GRAM(S): 4; .5 INJECTION, POWDER, LYOPHILIZED, FOR SOLUTION INTRAVENOUS at 06:56

## 2023-08-08 RX ADMIN — Medication 50 MILLIGRAM(S): at 06:57

## 2023-08-08 RX ADMIN — Medication 1 APPLICATION(S): at 22:54

## 2023-08-08 RX ADMIN — Medication 81 MILLIGRAM(S): at 15:17

## 2023-08-08 RX ADMIN — PIPERACILLIN AND TAZOBACTAM 25 GRAM(S): 4; .5 INJECTION, POWDER, LYOPHILIZED, FOR SOLUTION INTRAVENOUS at 15:18

## 2023-08-08 RX ADMIN — Medication 50 MILLIGRAM(S): at 15:18

## 2023-08-08 RX ADMIN — Medication 50 MILLIGRAM(S): at 22:00

## 2023-08-08 RX ADMIN — Medication 1 APPLICATION(S): at 06:57

## 2023-08-08 NOTE — PROGRESS NOTE ADULT - PROBLEM SELECTOR PLAN 1
Pt meets sepsis criteria on admission with tachycardia and tachypnea , however, I believe pt's shock is d/t adrenal insufficiency, doesn't appear septic clinically  Per outpt review, pt bp has ranged from 114/76 -156/89  - pt confirmed to have shock d/t adrenal insufficiency, cortisol low 0.5 , also may have nephritis /colitis from immunotherapy as well, given iv hydrocortisone 100mg x1 and now on hydrocortisone 50mg iv q6h  taper steroid per endo, likely transition to po hydrocortisone 20mg am and 10mg pm on DC  - need to r/o hypophysitis, check LH, FSH, IGF-1, ACTH (with AM labs), estradiol, and prolactin. If LH/FSH are low fits a central picture since pt is menopausal  - s/p fluid resuscitation  - check TTE  - daily labs BMP, Mg, phos  -Hold methimazole   -CXR negative, RVP negative   -UCx normal bianca, UA not impressive  -f/u Bcx  -stool neg for C. diff, check GI PCR  -s/p vanc and Zosyn, on empiric zosyn per ID  -pt also has left eye conjunctivitis, c/w tobramycin eye drop  - ID following Pt meets sepsis criteria on admission with tachycardia and tachypnea , however, I believe pt's shock is d/t adrenal insufficiency, doesn't appear septic clinically  Per outpt review, pt bp has ranged from 114/76 -156/89  - pt confirmed to have shock d/t adrenal insufficiency, cortisol low 0.5 , also may have nephritis /colitis from immunotherapy as well, given iv hydrocortisone 100mg x1 and now on hydrocortisone 50mg iv q6h  taper steroid per endo, likely transition to po hydrocortisone 20mg am and 10mg pm on DC  - need to r/o hypophysitis, check LH, FSH, IGF-1, ACTH (with AM labs), estradiol, and prolactin. If LH/FSH are low fits a central picture since pt is menopausal  - s/p fluid resuscitation  - check TTE  - daily labs BMP, Mg, phos  -Hold methimazole   -CXR negative, RVP negative   -UCx normal bianca, UA not impressive; Bcx ngtd  -stool neg for C. diff, check GI PCR  -s/p vanc and Zosyn, on empiric zosyn per ID  -pt also has left eye conjunctivitis, c/w tobramycin eye drop  - ID following

## 2023-08-08 NOTE — PROGRESS NOTE ADULT - ASSESSMENT
67y Female  with PMH of extensive stage small cell lung cancer on maintenance immunotherapy ( w/ atezolizumab , cycle 14 on 7/25)  presenting with lightheadedness , body aches, cough and chills x1, found to be septic.  Admitted for sepsis and hypotensions   Oncology consulted for further evaluation     Brief onc history: Extensive stage small cell lung cancer. Began first line chemo + immunotherapy with Carboplatin/Etoposide/Atezolizumab in August 2022; achieved [place]. Completed 4 cycles of chemo + immunotherapy in Oct 2022 followed by maintenance Atezolizumab as of Nov 2022.  ?    -Infectious workup ongoing, no growth to date  - C diff panel negative  -Patient with considerable hypotension on admission, AM cortisol low, concern for adrenal insufficiency from immunotherapy  Appreciate Endocrine consult for workup of hypophysitis , patient s.p stress hydrocortisone with improvement of BP  - MRI (stand up mri as outpatient) in June 2023 impression can be found in [employer]- was consistent with a   probable microadenoma within the right side of the pituitary gland. An endocrinological evaluation is recommended.   ECHO pending  -SIRI management as per primary team  -+ conjunctivitis, on tobramycin eye gtt  -s/w IVF hydration   -No plans for inpatient chemotherapy  -C/w Supportive care, pain control, Nutrition, PT, DVT ppx  -Rest of care as per primary team  -Patient to followup with Dr. Chou ([facility]) upon discharge  -Oncology will continue to follow with you    Case d/w oncology attending      Edita SIFUENTES  Oncology Physician Assistant  Just[facility] PARKINSON/[facility]    Pager [phone] also available on TEAMS as Edita SIFUENTES    If before 8am/after 5pm or on weekends please page On-call Oncology Fellow   67y Female  with PMH of extensive stage small cell lung cancer on maintenance immunotherapy ( w/ atezolizumab , cycle 14 on 7/25)  presenting with lightheadedness , body aches, cough and chills x1, found to be septic.  Admitted for sepsis and hypotension  Oncology consulted for further evaluation     Brief onc history: Extensive stage small cell lung cancer. Began first line chemo + immunotherapy with Carboplatin/Etoposide/Atezolizumab in August 2022; achieved [place]. Completed 4 cycles of chemo + immunotherapy in Oct 2022 followed by maintenance Atezolizumab as of Nov 2022.  ?    -Infectious workup ongoing, no growth to date  - C diff panel negative  -Patient with considerable hypotension on admission, AM cortisol low, concern for adrenal insufficiency from immunotherapy  Appreciate Endocrine consult for workup of hypophysitis , patient s.p stress hydrocortisone with improvement of BP  - MRI (stand up mri as outpatient) in June 2023 impression can be found in [employer]- was consistent with a   probable microadenoma within the right side of the pituitary gland. An endocrinological evaluation is recommended.   ECHO pending  -SIRI management as per primary team  -+ conjunctivitis, on tobramycin eye gtt  -s/w IVF hydration   -No plans for inpatient chemotherapy  -C/w Supportive care, pain control, Nutrition, PT, DVT ppx  -Rest of care as per primary team  -Patient to followup with Dr. Chou ([facility]) upon discharge  -Oncology will continue to follow with you    Case d/w oncology attending      Edita SIFUENTES  Oncology Physician Assistant  [facility]ARKINSON[facility]    Pager [phone] also available on TEAMS as Edita SIFUENTES    If before 8am/after 5pm or on weekends please page On-call Oncology Fellow

## 2023-08-08 NOTE — PROGRESS NOTE ADULT - SUBJECTIVE AND OBJECTIVE BOX
INTERVAL HPI/OVERNIGHT EVENTS:  Patient seen at bedside.  Patient without acute events  Denies fever or chills or any further episodes of diarrhea  Alert and oriented x 4, full insight on current admission  Has been monitoring her vitals signs- clover BP in hospital      VITAL SIGNS:  T(F): 97 (08-08-23 @ 06:00)  HR: 93 (08-08-23 @ 06:00)  BP: 121/73 (08-08-23 @ 06:00)  RR: 17 (08-08-23 @ 06:00)  SpO2: 100% (08-07-23 @ 18:30)  Wt(kg): --    PHYSICAL EXAM:  CONSTITUTIONAL: NAD, well-developed  Eye: conjunctivitis left eye  RESPIRATORY: Normal respiratory effort; lungs are clear to auscultation bilaterally  CARDIOVASCULAR: Regular rate and rhythm, normal S1 and S2, no murmur/rub/gallop; No lower extremity edema; Peripheral pulses are 2+ bilaterally  ABDOMEN: Nontender to palpation, normoactive bowel sounds, no rebound/guarding; No hepatosplenomegaly  MUSCULOSKELETAL: no clubbing or cyanosis of digits; no joint swelling or tenderness to palpation  PSYCH: A+O to person, place, and time; affect appropriate    MEDICATIONS  (STANDING):  aspirin enteric coated 81 milliGRAM(s) Oral daily  enoxaparin Injectable 40 milliGRAM(s) SubCutaneous every 24 hours  hydrocortisone sodium succinate Injectable 50 milliGRAM(s) IV Push every 6 hours  lactated ringers. 1000 milliLiter(s) (75 mL/Hr) IV Continuous <Continuous>  piperacillin/tazobactam IVPB.. 3.375 Gram(s) IV Intermittent every 8 hours  tobramycin 0.3% Ointment 1 Application(s) Left EYE every 6 hours    MEDICATIONS  (PRN):      Allergies    Tylenol (Stomach Upset)    Intolerances        LABS:                        11.8   9.07  )-----------( 265      ( 07 Aug 2023 06:24 )             34.9     08-07    134<L>  |  103  |  18  ----------------------------<  99  4.8   |  22  |  1.71<H>    Ca    9.0      07 Aug 2023 06:24  Phos  4.5     08-07  Mg     1.30     08-07    TPro  6.3  /  Alb  3.1<L>  /  TBili  0.3  /  DBili  x   /  AST  20  /  ALT  10  /  AlkPhos  31<L>  08-07    PT/INR - ( 06 Aug 2023 17:30 )   PT: 12.5 sec;   INR: 1.12 ratio         PTT - ( 06 Aug 2023 17:30 )  PTT:30.6 sec  Urinalysis Basic - ( 07 Aug 2023 06:24 )    Color: x / Appearance: x / SG: x / pH: x  Gluc: 99 mg/dL / Ketone: x  / Bili: x / Urobili: x   Blood: x / Protein: x / Nitrite: x   Leuk Esterase: x / RBC: x / WBC x   Sq Epi: x / Non Sq Epi: x / Bacteria: x        RADIOLOGY & ADDITIONAL TESTS:  Studies reviewed.

## 2023-08-08 NOTE — PROGRESS NOTE ADULT - ASSESSMENT
Patient is a 67y F PMHx HTN, Lung CA (on chemo, last 7/22/23), presents with complaints of being lightheaded, body aches, cough and chills x1, found to have shock likely d/t adrenal insufficiency, likely related to immunotherapy

## 2023-08-08 NOTE — PROGRESS NOTE ADULT - ASSESSMENT
67 year old with history of small cell lung cancer on immunotherpay presents with a few days of feeling lightheaded and associated diarrhea.  Recent sick contact.   Hypotensive in ED.    1) Hypotension  UA with minimal pyuria not suggestive of a UTI  RVP negative  CXR without focal consolidation  C diff is negative  GI pcr not sent. Can resend if diarrhea recurs  Stop zosyn    Hypotension though to be related to adrenal insufficiency  On steroids    2) Diarrhea  check GI pcr if it recurs    3) leukocytosis   S/p steroid    4) ? conjunctivitis tobramycin gtts for 7 days    ID service will sign off  Call ID service with questions       67 year old with history of small cell lung cancer on immunotherpay presents with a few days of feeling lightheaded and associated diarrhea.  Recent sick contact.   Hypotensive in ED.    1) Hypotension  UA with minimal pyuria not suggestive of a UTI  RVP negative  CXR without focal consolidation  C diff is negative  GI pcr not sent. Can resend if diarrhea recurs but today is day 3 of abx.  Stop zosyn    Hypotension though to be related to adrenal insufficiency  On steroids    2) Diarrhea  check GI pcr if it recurs    3) leukocytosis   S/p steroid    4) ? conjunctivitis tobramycin gtts for 7 days    ID service will sign off  Call ID service with questions

## 2023-08-08 NOTE — PROGRESS NOTE ADULT - SUBJECTIVE AND OBJECTIVE BOX
Dr. Katya Novak  Pager [phone]    PROGRESS NOTE:     Patient is a 67y old  Female who presents with a chief complaint of sepsis (07 Aug 2023 13:21)      SUBJECTIVE / OVERNIGHT EVENTS: pt is now hemodynamically stable, afebrile   ADDITIONAL REVIEW OF SYSTEMS: no chest pain/sob    MEDICATIONS  (STANDING):  aspirin enteric coated 81 milliGRAM(s) Oral daily  enoxaparin Injectable 40 milliGRAM(s) SubCutaneous every 24 hours  hydrocortisone sodium succinate Injectable 50 milliGRAM(s) IV Push every 6 hours  lactated ringers. 1000 milliLiter(s) (75 mL/Hr) IV Continuous <Continuous>  piperacillin/tazobactam IVPB.. 3.375 Gram(s) IV Intermittent every 8 hours  tobramycin 0.3% Ointment 1 Application(s) Left EYE every 6 hours    MEDICATIONS  (PRN):      CAPILLARY BLOOD GLUCOSE        I&O's Summary    07 Aug 2023 07:01  -  08 Aug 2023 07:00  --------------------------------------------------------  IN: 200 mL / OUT: 0 mL / NET: 200 mL        PHYSICAL EXAM:  Vital Signs Last 24 Hrs  T(C): 36.1 (08 Aug 2023 06:00), Max: 36.9 (07 Aug 2023 14:51)  T(F): 97 (08 Aug 2023 06:00), Max: 98.5 (07 Aug 2023 14:51)  HR: 93 (08 Aug 2023 06:00) (89 - 97)  BP: 121/73 (08 Aug 2023 06:00) (92/56 - 125/72)  BP(mean): --  RR: 17 (08 Aug 2023 06:00) (17 - 18)  SpO2: 100% (07 Aug 2023 18:30) (100% - 100%)    Parameters below as of 08 Aug 2023 06:00  Patient On (Oxygen Delivery Method): nasal cannula  O2 Flow (L/min): 2    CONSTITUTIONAL: NAD, well-developed  Eye: conjunctivitis left eye improved  RESPIRATORY: Normal respiratory effort; lungs are clear to auscultation bilaterally  CARDIOVASCULAR: Regular rate and rhythm, normal S1 and S2, no murmur/rub/gallop; No lower extremity edema; Peripheral pulses are 2+ bilaterally  ABDOMEN: Nontender to palpation, normoactive bowel sounds, no rebound/guarding; No hepatosplenomegaly  MUSCULOSKELETAL: no clubbing or cyanosis of digits; no joint swelling or tenderness to palpation  PSYCH: A+O to person, place, and time; affect appropriate    LABS:                        11.8   9.07  )-----------( 265      ( 07 Aug 2023 06:24 )             34.9     08-07    134<L>  |  103  |  18  ----------------------------<  99  4.8   |  22  |  1.71<H>    Ca    9.0      07 Aug 2023 06:24  Phos  4.5     08-07  Mg     1.30     08-07    TPro  6.3  /  Alb  3.1<L>  /  TBili  0.3  /  DBili  x   /  AST  20  /  ALT  10  /  AlkPhos  31<L>  08-07    PT/INR - ( 06 Aug 2023 17:30 )   PT: 12.5 sec;   INR: 1.12 ratio         PTT - ( 06 Aug 2023 17:30 )  PTT:30.6 sec      Urinalysis Basic - ( 07 Aug 2023 06:24 )    Color: x / Appearance: x / SG: x / pH: x  Gluc: 99 mg/dL / Ketone: x  / Bili: x / Urobili: x   Blood: x / Protein: x / Nitrite: x   Leuk Esterase: x / RBC: x / WBC x   Sq Epi: x / Non Sq Epi: x / Bacteria: x        Culture - Urine (collected 06 Aug 2023 20:31)  Source: Clean Catch Clean Catch (Midstream)  Final Report (08 Aug 2023 09:14):    <10,000 CFU/mL Normal Urogenital Roxy    Culture - Blood (collected 06 Aug 2023 18:13)  Source: .Blood Blood-Peripheral  Preliminary Report (07 Aug 2023 21:01):    No growth at 24 hours    Culture - Blood (collected 06 Aug 2023 17:30)  Source: .Blood Blood-Peripheral  Preliminary Report (07 Aug 2023 21:01):    No growth at 24 hours        RADIOLOGY & ADDITIONAL TESTS:  Results Reviewed:   Imaging Personally Reviewed:    Electrocardiogram Personally Reviewed:    COORDINATION OF CARE:  Care Discussed with Consultants/Other Providers [Y/N]: endocrine fellow, stress steroid, check labs  Prior or Outpatient Records Reviewed [Y/N]:

## 2023-08-08 NOTE — PROGRESS NOTE ADULT - ASSESSMENT
Patient is a 67y F PMHx HTN, small cell lung cancer (s/p chemo and immunotherapy, now on maintenance Atezolizumab as of Nov 2022) , presenting with lightheadedness for 2 days and intermittent diarrhea. Pt hypotensive at urgent care with BP 81/54 in ED, minimal improvement following multiple fluid boluses, with serum cortisol 0.5, consistent with adrenal insufficiency. Patient is high risk and high level decision making.    #Adrenal Insufficiency - suspect secondary AI vs. hypophysitis (however pt does not have headaches which is typical  #Hypotension - adrenal crisis, now improved with steroids  - Pt with serum cortisol 0.5, diagnostic of adrenal insufficiency, which is a known complication of checkpoint inhibitor immunotherapy. There is not need for cosyntropin testing at this point.  - Hypotensive to 81/54 in ED with minimal improvement following fluid bolus x4 and maintenance fluids  - 100 mg Hydrocortisone IVP given w/ improvement BP to 118/79  - Continue hydrocortisone 50mg IV q6 for now, monitor BP and symptoms for improvement; can decrease hydrocortisone to 50 mg q8h with 24 hours clinical and symptomatic improvement   - Must consider hypophysitis; check LH, FSH, IGF-1, ACTH (with AM labs), estradiol, and prolactin. If LH/FSH are low fits a central picture since pt is menopausal  - Will consider pituitary MRI, however patient has issues w/ claustrophobia as per primary team -> can plan to address as outpatient and treat hormonally as above     To help with discharge planning, Please print and give the pt info section for patients under adrenal insufficiency (this will educate the patient regarding the warning signs of adrenal crisis )   -  patient that they should take 2-3x her home dose in cases of illness, fever, accidents, and surgery  - pt should receive stress dosing (hydrocortisone 50mg q8) with any major illness or surgical procedure  - Should pt be unable to tolerate PO and is unable to take hydrocortisone, they will need an emergency injection. Please discharge with a prescription for 100 mg Solu-Cortef Act-O-Vial and the following instructions:  [email or web address]  - pt should obtain a medical alert bracelet or necklace to inform emergency providers that they have adrenal insufficiency  [email or web address]  - Patient should also monitor BP closely at home    #Hyperthyroidism   - Patient unsure as to cause of hyperthyroidism, denies any active symptoms. Enlarged thyroid on exam.   - TSH NORMAL 3.07, free thyroxine LOW at 0.7 on 8/7  - abnormal TFTs could be due to overtreatment of hyperthyroidism (although would suspect TSH to be high), nonthyroidal illness,  vs hypophysitis i/s/o checkpoint inhibitor therapy with low FT4 from central process  - On methimazole 5mg at home; dose held today.   - hold methimazole for now  - Recheck FT4 on Wednesday, 8/9  - Please order TSH receptor Ab, TSI Ab Patient is a 67y F PMHx HTN, small cell lung cancer (s/p chemo and immunotherapy, now on maintenance Atezolizumab as of Nov 2022) , presenting with lightheadedness for 2 days and intermittent diarrhea. Pt hypotensive at urgent care with BP 81/54 in ED, minimal improvement following multiple fluid boluses, with serum cortisol 0.5, consistent with adrenal insufficiency. Patient now on hydrocortisone 50 mg with positive response, BP 120s systolic, as well as symptomatic improvement. Patient is high risk and high level decision making.    #Adrenal Insufficiency - suspect secondary AI vs. hypophysitis (however pt does not have headaches which is typical  #Hypotension - adrenal crisis, now improved with steroids  - Pt with serum cortisol 0.5, diagnostic of adrenal insufficiency, which is a known complication of checkpoint inhibitor immunotherapy. There is not need for cosyntropin testing at this point.  - Hypotensive to 81/54 in ED with minimal improvement following fluid bolus x4 and maintenance fluids  - 100 mg Hydrocortisone IVP given w/ improvement BP to 118/79  - Considering positive response to hydrocortisone today 8/8, taper to hydrocortisone 50mg q8 po, assuming patient can tolerate po. Monitor BP and symptoms for improvement. - Must consider hypophysitis; check LH, FSH, IGF-1, ACTH, estradiol, and prolactin. If LH/FSH are low fits a central picture since pt is menopausal  - Will consider pituitary MRI, however patient has issues w/ claustrophobia as per primary team -> can plan to address as outpatient and treat hormonally as above     To help with discharge planning, Please print and give the pt info section for patients under adrenal insufficiency (this will educate the patient regarding the warning signs of adrenal crisis )   -  patient that they should take 2-3x her home dose in cases of illness, fever, accidents, and surgery  - pt should receive stress dosing (hydrocortisone 50mg q8) with any major illness or surgical procedure  - Should pt be unable to tolerate PO and is unable to take hydrocortisone, they will need an emergency injection. Please discharge with a prescription for 100 mg Solu-Cortef Act-O-Vial and the following instructions:  [email or web address]-[email or web address]-[email or web address]-[email or web address]  - pt should obtain a medical alert bracelet or necklace to inform emergency providers that they have adrenal insufficiency  [email or web address]  - Patient should also monitor BP closely at home    #Hyperthyroidism   - Patient unsure as to cause of hyperthyroidism, denies any active symptoms. Enlarged thyroid on exam.   - TSH NORMAL 3.07, free thyroxine LOW at 0.7 on 8/7  - abnormal TFTs could be due to overtreatment of hyperthyroidism (although would suspect TSH to be high), nonthyroidal illness,  vs hypophysitis i/s/o checkpoint inhibitor therapy with low FT4 from central process  - On methimazole 5mg at home; dose held today.   - hold methimazole for now  - Recheck FT4 on Wednesday, 8/9  - Please order TSH receptor Ab, TSI Ab Patient is a 67y F PMHx HTN, small cell lung cancer (s/p chemo and immunotherapy, now on maintenance Atezolizumab as of Nov 2022) , presenting with lightheadedness for 2 days and intermittent diarrhea. Pt hypotensive at urgent care with BP 81/54 in ED, minimal improvement following multiple fluid boluses, with serum cortisol 0.5, consistent with adrenal insufficiency. Patient now on hydrocortisone 50 mg with positive response, BP 120s systolic, as well as symptomatic improvement.    #Adrenal Insufficiency - suspect secondary AI vs. hypophysitis (however pt does not have headaches which is typical of the latter  #Hypotension - adrenal crisis, now improved with steroids  - Pt with serum cortisol 0.5, diagnostic of adrenal insufficiency, which is a known complication of checkpoint inhibitor immunotherapy. There is not need for cosyntropin testing at this point.  - ACTH <2 on 8/8 (however on steroids), will treat as presumably secondary adrenal insufficiency (central issue).   - Considering positive response to hydrocortisone today 8/8, taper to hydrocortisone 50mg q8 PO. Monitor BP and symptoms for improvement. - check LH, FSH, Free T4, IGF-1, ACTH, estradiol, and prolactin with morning labs. If LH/FSH are low fits a central picture since pt is menopausal  - can consider pituitary MRI, however patient has issues w/ claustrophobia as per primary team -> can plan to address as outpatient and treat hormonally as above especially since she does not have typical headache presentation with hypophysitis   - will plan to d/c pt on PO hydrocortisone 20 mg at 8 am and 10 mg at 2 pm     To help with discharge planning, Please print and give the pt info section for patients under adrenal insufficiency (this will educate the patient regarding the warning signs of adrenal crisis )   -  patient that they should take 2-3x her home dose in cases of illness, fever, accidents, and surgery  - pt should receive stress dosing (hydrocortisone 50mg q8) with any major illness or surgical procedure  - Should pt be unable to tolerate PO and is unable to take hydrocortisone, they will need an emergency injection. Please discharge with a prescription for 100 mg Solu-Cortef Act-O-Vial and the following instructions:  [email or web address]-[email or web address]-[email or web address]-[email or web address]  - pt should obtain a medical alert bracelet or necklace to inform emergency providers that they have adrenal insufficiency  [email or web address]  - Patient should also monitor BP closely at home    #Hyperthyroidism   - Patient unsure as to cause of hyperthyroidism, denies any active symptoms. Enlarged thyroid on exam.   - TSH NORMAL 3.07, free thyroxine LOW at 0.7 on 8/7  - abnormal TFTs could be due to overtreatment of hyperthyroidism (although would suspect TSH to be high), nonthyroidal illness,  vs hypophysitis i/s/o checkpoint inhibitor therapy with low FT4 from central process  - On methimazole 5mg at home; dose held today.   - hold methimazole for now  - Recheck FT4 on Wednesday, 8/9  - Please order TSH receptor Ab, Thyroid stimulating immunoglobulin Ab

## 2023-08-08 NOTE — PROGRESS NOTE ADULT - SUBJECTIVE AND OBJECTIVE BOX
HPI:  Patient is a 67y F PMHx HTN, Lung CA (on chemo, last 7/22/23), p/w lightheadedness for 2 days. Patient reports that she has been having intermittent episodes of diarrhea since 8/3. Her son, whom she lives with, initially experienced diarrhea for approximately a week prior. Pt denies recent travel hx or other events. Pt has also had decreased PO intake since then as well. She presented to urgent care because she felt lightheadeded and was noted to be hypotensive, was instructed to come to hospital. Pt endorses a chronic dry cough. She denies fevers, chest pain, dyspnea, nausea, vomiting, melena/hematochezia, abdominal pain and dysuria. (07 Aug 2023 02:21)    Reason for consult: Adrenal insufficiency, Hx of hyperthyroidism    Patient was diagnosed with small cell lung cancer in July 2022. She has been treated with maintenance Atezolizumab since 11/2022 (last dose per pt was in 6/2023) and denies experiencing adverse effects until now. Pt endorses throwing up once yesterday evening 8/6 as well as multiple days of intermittent diarrhea and fatigue. She denies Hx of adrenal issues or known pituitary tumor in the past as well as family history of these conditions. Her BP is generally around 110/70 at home and she denies previous episodes of hypotension. She does not usually take steroids. She does not have headaches.     Regarding her hyperthyroidism, the patient states she has been following with a [facility] endocrinologist and takes methimazole 5mg, which was started several years ago. She does not recall having a history of Graves or nodules. She is not sure about thyroid antibodies. She denies other autoimmune conditions in her self. Notes that daughter has hypothyroidism. She endorses weight loss over the last few years which her and daughter acknowledge may also be due to her cancer. She denies other symptoms of hyperthyroidism such as feeling hot/cold and palpitations.     Of note, pt reports she can only get standing/open MRI due to claustrophobia.    Overnight events/intermittent history:       PAST MEDICAL & SURGICAL HISTORY:  HTN (hypertension)    Hyperthyroidism    Lung cancer    History of cholecystectomy  1989    FAMILY HISTORY:  FH: HTN (hypertension) (Mother)    Outpatient Medications:    MEDICATIONS  (STANDING):  aspirin enteric coated 81 milliGRAM(s) Oral daily  enoxaparin Injectable 40 milliGRAM(s) SubCutaneous every 24 hours  hydrocortisone sodium succinate Injectable 50 milliGRAM(s) IV Push every 6 hours  lactated ringers. 1000 milliLiter(s) (75 mL/Hr) IV Continuous <Continuous>  piperacillin/tazobactam IVPB.. 3.375 Gram(s) IV Intermittent every 8 hours  tobramycin 0.3% Ointment 1 Application(s) Left EYE every 6 hours    MEDICATIONS  (PRN):    Allergies    Tylenol (Stomach Upset)    Intolerances    Review of Systems:  Constitutional: No fever  Eyes: No blurry vision  Neuro: No tremors  HEENT: No pain  Cardiovascular: No chest pain, palpitations  Respiratory: No SOB, no cough  GI: +Vomiting, diarrhea.   : No dysuria  Skin: no rash  Psych: no depression  Endocrine: no polyuria, polydipsia  Hem/lymph: +Weight loss. no swelling  Osteoporosis: no fractures    ALL OTHER SYSTEMS REVIEWED AND NEGATIVE    PHYSICAL EXAM:    Vital Signs Last 24 Hrs  T(C): 36.1 (08 Aug 2023 06:00), Max: 36.9 (07 Aug 2023 14:51)  T(F): 97 (08 Aug 2023 06:00), Max: 98.5 (07 Aug 2023 14:51)  HR: 93 (08 Aug 2023 06:00) (89 - 97)  BP: 121/73 (08 Aug 2023 06:00) (92/56 - 125/72)  RR: 17 (08 Aug 2023 06:00) (17 - 18)  SpO2: 100% (07 Aug 2023 18:30) (100% - 100%)    Parameters below as of 08 Aug 2023 06:00  Patient On (Oxygen Delivery Method): nasal cannula  O2 Flow (L/min): 2    GENERAL: NAD, well-groomed, well-developed  EYES: No proptosis, no lid lag, anicteric  HEENT:  Atraumatic, Normocephalic, moist mucous membranes  THYROID: Enlarged, no palpable nodules  RESPIRATORY: Clear to auscultation bilaterally; No rales, rhonchi, wheezing  CARDIOVASCULAR: Regular rate and rhythm; No murmurs; no peripheral edema  GI: Soft, nontender, non distended, normal bowel sounds  SKIN: Dry, intact, No rashes or lesions  MUSCULOSKELETAL: Full range of motion, normal strength  NEURO: sensation intact, extraocular movements intact, no tremor  PSYCH: Alert and oriented x 3, normal affect, normal mood  CUSHING'S SIGNS: no striae      LABS:                  11.8   9.07  )-----------( 265      ( 07 Aug 2023 06:24 )             34.9       08-07    134<L>  |  103  |  18  ----------------------------<  99  4.8   |  22  |  1.71<H>    eGFR: 33<L>    Ca    9.0      08-07  Mg     1.30     08-07  Phos  4.5     08-07    TPro  6.3  /  Alb  3.1<L>  /  TBili  0.3  /  DBili  x   /  AST  20  /  ALT  10  /  AlkPhos  31<L>  08-07      Thyroid Function Tests:  08-07 @ 06:24 TSH 3.07 FreeT4 0.7 T3 -- Anti TPO -- Anti Thyroglobulin Ab -- TSI --      Cortisol AM, Serum: 0.5 (08-07)               HPI:  Patient is a 67y F PMHx HTN, Lung CA (on chemo, last 7/22/23), p/w lightheadedness for 2 days. Patient reports that she has been having intermittent episodes of diarrhea since 8/3. Her son, whom she lives with, initially experienced diarrhea for approximately a week prior. Pt denies recent travel hx or other events. Pt has also had decreased PO intake since then as well. She presented to urgent care because she felt lightheadeded and was noted to be hypotensive, was instructed to come to hospital. Pt endorses a chronic dry cough. She denies fevers, chest pain, dyspnea, nausea, vomiting, melena/hematochezia, abdominal pain and dysuria. (07 Aug 2023 02:21)    Reason for consult: Adrenal insufficiency, Hx of hyperthyroidism    Patient was diagnosed with small cell lung cancer in July 2022. She has been treated with maintenance Atezolizumab since 11/2022 (last dose per pt was in 6/2023) and denies experiencing adverse effects until now. Pt endorses throwing up once yesterday evening 8/6 as well as multiple days of intermittent diarrhea and fatigue. She denies Hx of adrenal issues or known pituitary tumor in the past as well as family history of these conditions. Her BP is generally around 110/70 at home and she denies previous episodes of hypotension. She does not usually take steroids. She does not have headaches.     Regarding her hyperthyroidism, the patient states she has been following with a [facility] endocrinologist and takes methimazole 5mg, which was started several years ago. She does not recall having a history of Graves or nodules. She is not sure about thyroid antibodies. She denies other autoimmune conditions in her self. Notes that daughter has hypothyroidism. She endorses weight loss over the last few years which her and daughter acknowledge may also be due to her cancer. She denies other symptoms of hyperthyroidism such as feeling hot/cold and palpitations.     Of note, pt reports she can only get standing/open MRI due to claustrophobia.    Overnight events/interval history:       PAST MEDICAL & SURGICAL HISTORY:  HTN (hypertension)    Hyperthyroidism    Lung cancer    History of cholecystectomy  1989    FAMILY HISTORY:  FH: HTN (hypertension) (Mother)    Outpatient Medications:    MEDICATIONS  (STANDING):  aspirin enteric coated 81 milliGRAM(s) Oral daily  enoxaparin Injectable 40 milliGRAM(s) SubCutaneous every 24 hours  hydrocortisone sodium succinate Injectable 50 milliGRAM(s) IV Push every 6 hours  lactated ringers. 1000 milliLiter(s) (75 mL/Hr) IV Continuous <Continuous>  piperacillin/tazobactam IVPB.. 3.375 Gram(s) IV Intermittent every 8 hours  tobramycin 0.3% Ointment 1 Application(s) Left EYE every 6 hours    MEDICATIONS  (PRN):    Allergies    Tylenol (Stomach Upset)    Intolerances    Review of Systems:  Constitutional: No fever  Eyes: No blurry vision  Neuro: No tremors  HEENT: No pain  Cardiovascular: No chest pain, palpitations  Respiratory: No SOB, no cough  GI: +Vomiting, diarrhea.   : No dysuria  Skin: no rash  Psych: no depression  Endocrine: no polyuria, polydipsia  Hem/lymph: +Weight loss. no swelling  Osteoporosis: no fractures    ALL OTHER SYSTEMS REVIEWED AND NEGATIVE    PHYSICAL EXAM:    Vital Signs Last 24 Hrs  T(C): 36.1 (08 Aug 2023 06:00), Max: 36.9 (07 Aug 2023 14:51)  T(F): 97 (08 Aug 2023 06:00), Max: 98.5 (07 Aug 2023 14:51)  HR: 93 (08 Aug 2023 06:00) (89 - 97)  BP: 121/73 (08 Aug 2023 06:00) (92/56 - 125/72)  RR: 17 (08 Aug 2023 06:00) (17 - 18)  SpO2: 100% (07 Aug 2023 18:30) (100% - 100%)    Parameters below as of 08 Aug 2023 06:00  Patient On (Oxygen Delivery Method): nasal cannula  O2 Flow (L/min): 2    GENERAL: NAD, well-groomed, well-developed  EYES: No proptosis, no lid lag, anicteric  HEENT:  Atraumatic, Normocephalic, moist mucous membranes  THYROID: Enlarged, no palpable nodules  RESPIRATORY: Clear to auscultation bilaterally; No rales, rhonchi, wheezing  CARDIOVASCULAR: Regular rate and rhythm; No murmurs; no peripheral edema  GI: Soft, nontender, non distended, normal bowel sounds  SKIN: Dry, intact, No rashes or lesions  MUSCULOSKELETAL: Full range of motion, normal strength  NEURO: sensation intact, extraocular movements intact, no tremor  PSYCH: Alert and oriented x 3, normal affect, normal mood  CUSHING'S SIGNS: no striae      LABS:                  11.8   9.07  )-----------( 265      ( 07 Aug 2023 06:24 )             34.9       08-07    134<L>  |  103  |  18  ----------------------------<  99  4.8   |  22  |  1.71<H>    eGFR: 33<L>    Ca    9.0      08-07  Mg     1.30     08-07  Phos  4.5     08-07    TPro  6.3  /  Alb  3.1<L>  /  TBili  0.3  /  DBili  x   /  AST  20  /  ALT  10  /  AlkPhos  31<L>  08-07      Thyroid Function Tests:  08-07 @ 06:24 TSH 3.07 FreeT4 0.7 T3 -- Anti TPO -- Anti Thyroglobulin Ab -- TSI --      Cortisol AM, Serum: 0.5 (08-07)               HPI:  Patient is a 67y F PMHx HTN, Lung CA (on chemo, last 7/22/23), p/w lightheadedness for 2 days. Patient reports that she has been having intermittent episodes of diarrhea since 8/3. Her son, whom she lives with, initially experienced diarrhea for approximately a week prior. Pt denies recent travel hx or other events. Pt has also had decreased PO intake since then as well. She presented to urgent care because she felt lightheadeded and was noted to be hypotensive, was instructed to come to hospital. Pt endorses a chronic dry cough. She denies fevers, chest pain, dyspnea, nausea, vomiting, melena/hematochezia, abdominal pain and dysuria. (07 Aug 2023 02:21)    Reason for consult: Adrenal insufficiency, Hx of hyperthyroidism    Patient was diagnosed with small cell lung cancer in July 2022. She has been treated with maintenance Atezolizumab since 11/2022 (last dose per pt was in 6/2023) and denies experiencing adverse effects until now. Pt endorses throwing up once yesterday evening 8/6 as well as multiple days of intermittent diarrhea and fatigue. She denies Hx of adrenal issues or known pituitary tumor in the past as well as family history of these conditions. Her BP is generally around 110/70 at home and she denies previous episodes of hypotension. She does not usually take steroids. She does not have headaches.     Regarding her hyperthyroidism, the patient states she has been following with a [facility] endocrinologist and takes methimazole 5mg, which was started several years ago. She does not recall having a history of Graves or nodules. She is not sure about thyroid antibodies. She denies other autoimmune conditions in her self. Notes that daughter has hypothyroidism. She endorses weight loss over the last few years which her and daughter acknowledge may also be due to her cancer. She denies other symptoms of hyperthyroidism such as feeling hot/cold and palpitations.     Of note, pt reports she can only get standing/open MRI due to claustrophobia.    Overnight events/interval history: Patient continues to feel well and reports increased energy, no acute complaints.       PAST MEDICAL & SURGICAL HISTORY:  HTN (hypertension)    Hyperthyroidism    Lung cancer    History of cholecystectomy  1989    FAMILY HISTORY:  FH: HTN (hypertension) (Mother)    Outpatient Medications:    MEDICATIONS  (STANDING):  aspirin enteric coated 81 milliGRAM(s) Oral daily  enoxaparin Injectable 40 milliGRAM(s) SubCutaneous every 24 hours  hydrocortisone sodium succinate Injectable 50 milliGRAM(s) IV Push every 6 hours  lactated ringers. 1000 milliLiter(s) (75 mL/Hr) IV Continuous <Continuous>  piperacillin/tazobactam IVPB.. 3.375 Gram(s) IV Intermittent every 8 hours  tobramycin 0.3% Ointment 1 Application(s) Left EYE every 6 hours    MEDICATIONS  (PRN):    Allergies    Tylenol (Stomach Upset)    Intolerances    Review of Systems:  Constitutional: No fever  Eyes: No blurry vision  Neuro: No tremors  HEENT: No pain  Cardiovascular: No chest pain, palpitations  Respiratory: No SOB, no cough  GI: +Vomiting, diarrhea.   : No dysuria  Skin: no rash  Psych: no depression  Endocrine: no polyuria, polydipsia  Hem/lymph: +Weight loss. no swelling  Osteoporosis: no fractures    ALL OTHER SYSTEMS REVIEWED AND NEGATIVE    PHYSICAL EXAM:    Vital Signs Last 24 Hrs  T(C): 36.1 (08 Aug 2023 06:00), Max: 36.9 (07 Aug 2023 14:51)  T(F): 97 (08 Aug 2023 06:00), Max: 98.5 (07 Aug 2023 14:51)  HR: 93 (08 Aug 2023 06:00) (89 - 97)  BP: 121/73 (08 Aug 2023 06:00) (92/56 - 125/72)  RR: 17 (08 Aug 2023 06:00) (17 - 18)  SpO2: 100% (07 Aug 2023 18:30) (100% - 100%)    Parameters below as of 08 Aug 2023 06:00  Patient On (Oxygen Delivery Method): nasal cannula  O2 Flow (L/min): 2    GENERAL: NAD, well-groomed, well-developed  EYES: No proptosis, no lid lag, anicteric  HEENT:  Atraumatic, Normocephalic, moist mucous membranes  THYROID: Enlarged, no palpable nodules  RESPIRATORY: Clear to auscultation bilaterally; No rales, rhonchi, wheezing  CARDIOVASCULAR: Regular rate and rhythm; No murmurs; no peripheral edema  GI: Soft, nontender, non distended, normal bowel sounds  SKIN: Dry, intact, No rashes or lesions  MUSCULOSKELETAL: Full range of motion, normal strength  NEURO: sensation intact, extraocular movements intact, no tremor  PSYCH: Alert and oriented x 3, normal affect, normal mood  CUSHING'S SIGNS: no striae      LABS:                             11.5   12.54 )-----------( 285      ( 08 Aug 2023 11:59 )             34.9       08-08    136  |  102  |  14  ----------------------------<  182<H>  4.8   |  24  |  1.43<H>    Ca    9.5      08 Aug 2023 11:59  Phos  3.7     08-08  Mg     1.90     08-08    TPro  6.8  /  Alb  3.5  /  TBili  0.3  /  DBili  x   /  AST  18  /  ALT  10  /  AlkPhos  33<L>  08-08        Thyroid Function Tests:  08-08 @ 11:59  TSH 0.86 FreeT4 0.7     Cortisol AM, Serum: 162.9 (08-08)               HPI:  Patient is a 67y F PMHx HTN, Lung CA (on chemo, last 7/22/23), p/w lightheadedness for 2 days. Patient reports that she has been having intermittent episodes of diarrhea since 8/3. Her son, whom she lives with, initially experienced diarrhea for approximately a week prior. Pt denies recent travel hx or other events. Pt has also had decreased PO intake since then as well. She presented to urgent care because she felt lightheadeded and was noted to be hypotensive, was instructed to come to hospital. Pt endorses a chronic dry cough. She denies fevers, chest pain, dyspnea, nausea, vomiting, melena/hematochezia, abdominal pain and dysuria. (07 Aug 2023 02:21)    Reason for consult: Adrenal insufficiency, Hx of hyperthyroidism    Patient was diagnosed with small cell lung cancer in July 2022. She has been treated with maintenance Atezolizumab since 11/2022 (last dose per pt was in 6/2023) and denies experiencing adverse effects until now. Pt endorses throwing up once yesterday evening 8/6 as well as multiple days of intermittent diarrhea and fatigue. She denies Hx of adrenal issues or known pituitary tumor in the past as well as family history of these conditions. Her BP is generally around 110/70 at home and she denies previous episodes of hypotension. She does not usually take steroids. She does not have headaches.     Regarding her hyperthyroidism, the patient states she has been following with a [facility] endocrinologist and takes methimazole 5mg, which was started several years ago. She does not recall having a history of Graves or nodules. She is not sure about thyroid antibodies. She denies other autoimmune conditions in her self. Notes that daughter has hypothyroidism. She endorses weight loss over the last few years which her and daughter acknowledge may also be due to her cancer. She denies other symptoms of hyperthyroidism such as feeling hot/cold and palpitations.     Of note, pt reports she can only get standing/open MRI due to claustrophobia.  ---  Overnight events/interval history: Patient continues to feel well and reports increased energy, no acute complaints. No vomiting.      PAST MEDICAL & SURGICAL HISTORY:  HTN (hypertension)    Hyperthyroidism    Lung cancer    History of cholecystectomy  1989    FAMILY HISTORY:  FH: HTN (hypertension) (Mother)    Outpatient Medications:    MEDICATIONS  (STANDING):  aspirin enteric coated 81 milliGRAM(s) Oral daily  enoxaparin Injectable 40 milliGRAM(s) SubCutaneous every 24 hours  hydrocortisone sodium succinate Injectable 50 milliGRAM(s) IV Push every 6 hours  lactated ringers. 1000 milliLiter(s) (75 mL/Hr) IV Continuous <Continuous>  piperacillin/tazobactam IVPB.. 3.375 Gram(s) IV Intermittent every 8 hours  tobramycin 0.3% Ointment 1 Application(s) Left EYE every 6 hours    MEDICATIONS  (PRN):    Allergies    Tylenol (Stomach Upset)    Intolerances    Review of Systems:  Constitutional: No fever  Eyes: No blurry vision  Neuro: No tremors  HEENT: No pain  Cardiovascular: No chest pain, palpitations  Respiratory: No SOB, no cough  GI: +Vomiting, diarrhea.   : No dysuria  Skin: no rash  Psych: no depression  Endocrine: no polyuria, polydipsia  Hem/lymph: +Weight loss. no swelling  Osteoporosis: no fractures    ALL OTHER SYSTEMS REVIEWED AND NEGATIVE    PHYSICAL EXAM:    Vital Signs Last 24 Hrs  T(C): 36.1 (08 Aug 2023 06:00), Max: 36.9 (07 Aug 2023 14:51)  T(F): 97 (08 Aug 2023 06:00), Max: 98.5 (07 Aug 2023 14:51)  HR: 93 (08 Aug 2023 06:00) (89 - 97)  BP: 121/73 (08 Aug 2023 06:00) (92/56 - 125/72)  RR: 17 (08 Aug 2023 06:00) (17 - 18)  SpO2: 100% (07 Aug 2023 18:30) (100% - 100%)    Parameters below as of 08 Aug 2023 06:00  Patient On (Oxygen Delivery Method): nasal cannula  O2 Flow (L/min): 2    GENERAL: NAD, well-groomed, well-developed  EYES: No proptosis, no lid lag, anicteric  HEENT:  Atraumatic, Normocephalic, moist mucous membranes  THYROID: Enlarged, no palpable nodules  RESPIRATORY: Clear to auscultation bilaterally; No rales, rhonchi, wheezing  CARDIOVASCULAR: Regular rate and rhythm; No murmurs; no peripheral edema  GI: Soft, nontender, non distended, normal bowel sounds  SKIN: Dry, intact, No rashes or lesions  MUSCULOSKELETAL: Full range of motion, normal strength  NEURO: sensation intact, extraocular movements intact, no tremor  PSYCH: Alert and oriented x 3, normal affect, normal mood  CUSHING'S SIGNS: no striae      LABS:                             11.5   12.54 )-----------( 285      ( 08 Aug 2023 11:59 )             34.9       08-08    136  |  102  |  14  ----------------------------<  182<H>  4.8   |  24  |  1.43<H>    Ca    9.5      08 Aug 2023 11:59  Phos  3.7     08-08  Mg     1.90     08-08    TPro  6.8  /  Alb  3.5  /  TBili  0.3  /  DBili  x   /  AST  18  /  ALT  10  /  AlkPhos  33<L>  08-08        Thyroid Function Tests:  08-08 @ 11:59  TSH 0.86 FreeT4 0.7     Cortisol AM, Serum: 162.9 (08-08)

## 2023-08-08 NOTE — PROGRESS NOTE ADULT - PROBLEM SELECTOR PLAN 2
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

## 2023-08-08 NOTE — PROGRESS NOTE ADULT - PROBLEM SELECTOR PLAN 4
Hold methimazole  FT4 low 0.7, TSH 3 Hold methimazole  FT4 low 0.7, TSH 3  - Recheck FT4 on Wednesday, 8/9  - f/u TSH receptor Ab, TSI Ab

## 2023-08-08 NOTE — PROGRESS NOTE ADULT - SUBJECTIVE AND OBJECTIVE BOX
Follow Up:      Inverval History/ROS: Patient is a 67y old  Female who presents with a chief complaint of hypotension, fever, diarrhea (08 Aug 2023 12:05)    No fever  BP is stable       Allergies    Tylenol (Stomach Upset)    Intolerances        ANTIMICROBIALS:  piperacillin/tazobactam IVPB.. 3.375 every 8 hours      OTHER MEDS:  aspirin enteric coated 81 milliGRAM(s) Oral daily  enoxaparin Injectable 40 milliGRAM(s) SubCutaneous every 24 hours  hydrocortisone sodium succinate Injectable 50 milliGRAM(s) IV Push every 6 hours  lactated ringers. 1000 milliLiter(s) IV Continuous <Continuous>  tobramycin 0.3% Ointment 1 Application(s) Left EYE every 6 hours      Vital Signs Last 24 Hrs  T(C): 36.8 (08 Aug 2023 10:00), Max: 36.8 (07 Aug 2023 23:01)  T(F): 98.2 (08 Aug 2023 10:00), Max: 98.2 (07 Aug 2023 23:01)  HR: 84 (08 Aug 2023 10:00) (84 - 97)  BP: 122/82 (08 Aug 2023 10:00) (121/73 - 125/72)  BP(mean): --  RR: 16 (08 Aug 2023 10:00) (16 - 18)  SpO2: 99% (08 Aug 2023 10:00) (99% - 100%)    Parameters below as of 08 Aug 2023 10:00  Patient On (Oxygen Delivery Method): nasal cannula  O2 Flow (L/min): 2      PHYSICAL EXAM:  General: [x ] non-toxic  HEAD/EYES: [ ] PERRL [x ] white sclera [ ] icterus  ENT:  [ ] normal [ ] supple [ ] thrush [ ] pharyngeal exudate  Cardiovascular:   [ ] murmur [ ] normal [ ] PPM/AICD  Respiratory:  [ ] clear to ausculation bilaterally  GI:  [ ] soft, non-tender, normal bowel sounds  :  [ ] sethi [ ] no CVA tenderness   Musculoskeletal:  [ ] no synovitis  Neurologic:  [ ] non-focal exam   Skin:  [ ] no rash  Lymph: [ ] no lymphadenopathy  Psychiatric:  [ ] appropriate affect [ ] alert & oriented  Lines:  [ ] no phlebitis [ ] central line                                11.5   12.54 )-----------( 285      ( 08 Aug 2023 11:59 )             34.9       08-08    136  |  102  |  14  ----------------------------<  182<H>  4.8   |  24  |  1.43<H>    Ca    9.5      08 Aug 2023 11:59  Phos  3.7     08-08  Mg     1.90     08-08    TPro  6.8  /  Alb  3.5  /  TBili  0.3  /  DBili  x   /  AST  18  /  ALT  10  /  AlkPhos  33<L>  08-08      Urinalysis Basic - ( 08 Aug 2023 11:59 )    Color: x / Appearance: x / SG: x / pH: x  Gluc: 182 mg/dL / Ketone: x  / Bili: x / Urobili: x   Blood: x / Protein: x / Nitrite: x   Leuk Esterase: x / RBC: x / WBC x   Sq Epi: x / Non Sq Epi: x / Bacteria: x        MICROBIOLOGY:Culture Results:   <10,000 CFU/mL Normal Urogenital Roxy (08-06-23 @ 20:31)  Culture Results:   No growth at 24 hours (08-06-23 @ 18:13)  Culture Results:   No growth at 24 hours (08-06-23 @ 17:30)      RADIOLOGY:

## 2023-08-09 ENCOUNTER — TRANSCRIPTION ENCOUNTER (OUTPATIENT)
Age: 68
End: 2023-08-09

## 2023-08-09 LAB
ALBUMIN SERPL ELPH-MCNC: 3.5 G/DL — SIGNIFICANT CHANGE UP (ref 3.3–5)
ALP SERPL-CCNC: 30 U/L — LOW (ref 40–120)
ALT FLD-CCNC: 9 U/L — SIGNIFICANT CHANGE UP (ref 4–33)
ANION GAP SERPL CALC-SCNC: 10 MMOL/L — SIGNIFICANT CHANGE UP (ref 7–14)
AST SERPL-CCNC: 19 U/L — SIGNIFICANT CHANGE UP (ref 4–32)
BILIRUB SERPL-MCNC: 0.2 MG/DL — SIGNIFICANT CHANGE UP (ref 0.2–1.2)
BUN SERPL-MCNC: 19 MG/DL — SIGNIFICANT CHANGE UP (ref 7–23)
CALCIUM SERPL-MCNC: 9.2 MG/DL — SIGNIFICANT CHANGE UP (ref 8.4–10.5)
CHLORIDE SERPL-SCNC: 104 MMOL/L — SIGNIFICANT CHANGE UP (ref 98–107)
CO2 SERPL-SCNC: 24 MMOL/L — SIGNIFICANT CHANGE UP (ref 22–31)
CREAT SERPL-MCNC: 1.47 MG/DL — HIGH (ref 0.5–1.3)
EGFR: 39 ML/MIN/1.73M2 — LOW
GLUCOSE SERPL-MCNC: 117 MG/DL — HIGH (ref 70–99)
HCT VFR BLD CALC: 31 % — LOW (ref 34.5–45)
HGB BLD-MCNC: 10.7 G/DL — LOW (ref 11.5–15.5)
MAGNESIUM SERPL-MCNC: 1.9 MG/DL — SIGNIFICANT CHANGE UP (ref 1.6–2.6)
MCHC RBC-ENTMCNC: 31.4 PG — SIGNIFICANT CHANGE UP (ref 27–34)
MCHC RBC-ENTMCNC: 34.5 GM/DL — SIGNIFICANT CHANGE UP (ref 32–36)
MCV RBC AUTO: 90.9 FL — SIGNIFICANT CHANGE UP (ref 80–100)
NRBC # BLD: 0 /100 WBCS — SIGNIFICANT CHANGE UP (ref 0–0)
NRBC # FLD: 0 K/UL — SIGNIFICANT CHANGE UP (ref 0–0)
PHOSPHATE SERPL-MCNC: 3.2 MG/DL — SIGNIFICANT CHANGE UP (ref 2.5–4.5)
PLATELET # BLD AUTO: 279 K/UL — SIGNIFICANT CHANGE UP (ref 150–400)
POTASSIUM SERPL-MCNC: 4.5 MMOL/L — SIGNIFICANT CHANGE UP (ref 3.5–5.3)
POTASSIUM SERPL-SCNC: 4.5 MMOL/L — SIGNIFICANT CHANGE UP (ref 3.5–5.3)
PROT SERPL-MCNC: 6.5 G/DL — SIGNIFICANT CHANGE UP (ref 6–8.3)
RBC # BLD: 3.41 M/UL — LOW (ref 3.8–5.2)
RBC # FLD: 13.1 % — SIGNIFICANT CHANGE UP (ref 10.3–14.5)
SODIUM SERPL-SCNC: 138 MMOL/L — SIGNIFICANT CHANGE UP (ref 135–145)
T4 FREE SERPL-MCNC: 0.6 NG/DL — LOW (ref 0.9–1.8)
WBC # BLD: 15.08 K/UL — HIGH (ref 3.8–10.5)
WBC # FLD AUTO: 15.08 K/UL — HIGH (ref 3.8–10.5)

## 2023-08-09 PROCEDURE — 71045 X-RAY EXAM CHEST 1 VIEW: CPT | Mod: 26

## 2023-08-09 PROCEDURE — 99233 SBSQ HOSP IP/OBS HIGH 50: CPT

## 2023-08-09 PROCEDURE — 99233 SBSQ HOSP IP/OBS HIGH 50: CPT | Mod: GC

## 2023-08-09 RX ORDER — HYDROCORTISONE 20 MG
1 TABLET ORAL
Qty: 108 | Refills: 0
Start: 2023-08-09 | End: 2023-09-07

## 2023-08-09 RX ORDER — HYDROCORTISONE 20 MG
2 TABLET ORAL
Qty: 60 | Refills: 0
Start: 2023-08-09 | End: 2023-09-07

## 2023-08-09 RX ORDER — HYDROCORTISONE 20 MG
5 TABLET ORAL
Qty: 10 | Refills: 0
Start: 2023-08-09 | End: 2023-08-10

## 2023-08-09 RX ORDER — HYDROCORTISONE 20 MG
100 TABLET ORAL
Qty: 1 | Refills: 0
Start: 2023-08-09 | End: 2023-08-09

## 2023-08-09 RX ORDER — IPRATROPIUM/ALBUTEROL SULFATE 18-103MCG
3 AEROSOL WITH ADAPTER (GRAM) INHALATION EVERY 6 HOURS
Refills: 0 | Status: DISCONTINUED | OUTPATIENT
Start: 2023-08-09 | End: 2023-08-10

## 2023-08-09 RX ORDER — METHIMAZOLE 10 MG/1
5 TABLET ORAL
Qty: 0 | Refills: 0 | DISCHARGE

## 2023-08-09 RX ORDER — TOBRAMYCIN 0.3 %
1 DROPS OPHTHALMIC (EYE)
Qty: 1 | Refills: 0
Start: 2023-08-09 | End: 2023-08-11

## 2023-08-09 RX ORDER — LOSARTAN POTASSIUM 100 MG/1
1 TABLET, FILM COATED ORAL
Qty: 0 | Refills: 0 | DISCHARGE

## 2023-08-09 RX ORDER — HYDROCORTISONE 20 MG
1 TABLET ORAL
Qty: 2 | Refills: 0
Start: 2023-08-09 | End: 2023-08-10

## 2023-08-09 RX ORDER — HYDROCORTISONE 20 MG
1 TABLET ORAL
Qty: 30 | Refills: 0
Start: 2023-08-09 | End: 2023-09-07

## 2023-08-09 RX ORDER — FUROSEMIDE 40 MG
40 TABLET ORAL ONCE
Refills: 0 | Status: COMPLETED | OUTPATIENT
Start: 2023-08-09 | End: 2023-08-09

## 2023-08-09 RX ORDER — HYDROCORTISONE 20 MG
50 TABLET ORAL EVERY 8 HOURS
Refills: 0 | Status: DISCONTINUED | OUTPATIENT
Start: 2023-08-09 | End: 2023-08-10

## 2023-08-09 RX ADMIN — Medication 3 MILLILITER(S): at 21:53

## 2023-08-09 RX ADMIN — Medication 1 APPLICATION(S): at 06:51

## 2023-08-09 RX ADMIN — Medication 1 APPLICATION(S): at 11:40

## 2023-08-09 RX ADMIN — Medication 50 MILLIGRAM(S): at 22:58

## 2023-08-09 RX ADMIN — Medication 81 MILLIGRAM(S): at 11:26

## 2023-08-09 RX ADMIN — Medication 50 MILLIGRAM(S): at 14:05

## 2023-08-09 RX ADMIN — Medication 40 MILLIGRAM(S): at 11:27

## 2023-08-09 RX ADMIN — ENOXAPARIN SODIUM 40 MILLIGRAM(S): 100 INJECTION SUBCUTANEOUS at 11:26

## 2023-08-09 RX ADMIN — Medication 1 APPLICATION(S): at 18:05

## 2023-08-09 RX ADMIN — Medication 50 MILLIGRAM(S): at 06:51

## 2023-08-09 NOTE — DISCHARGE NOTE PROVIDER - PROVIDER TOKENS
PROVIDER:[TOKEN:[[id number]]],PROVIDER:[TOKEN:[[id number]]],FREE:[LAST:[Your Primary Care Provider],PHONE:[(   )    -],FAX:[(   )    -]]

## 2023-08-09 NOTE — PROGRESS NOTE ADULT - PROBLEM SELECTOR PLAN 5
Pt with hx of hypertension  -meds: losartan 100  -hold in the setting of hypotension, adrenal insufficiency

## 2023-08-09 NOTE — DISCHARGE NOTE NURSING/CASE MANAGEMENT/SOCIAL WORK - NSDCFUADDAPPT_GEN_ALL_CORE_FT
Continue medications as prescribed. Follow up with your primary care doctor, oncologist/hematologist, and endocrinologist for further evaluation and management. Please call to make an appointment within 1-2 weeks of discharge.

## 2023-08-09 NOTE — DISCHARGE NOTE PROVIDER - NSFOLLOWUPCLINICS_GEN_ALL_ED_FT
[facility]ol[facility]  Pulmonology  [address] [place] [address], [place] [address]  Phone: [phone]  Fax:

## 2023-08-09 NOTE — PROGRESS NOTE ADULT - SUBJECTIVE AND OBJECTIVE BOX
Dr. Katya Novak  Pager [phone]    PROGRESS NOTE:     Patient is a 67y old  Female who presents with a chief complaint of hypotension, fever, diarrhea (08 Aug 2023 12:05)      SUBJECTIVE / OVERNIGHT EVENTS: denies chest pain , reports dyspnea on exertion, on 2L nc  ADDITIONAL REVIEW OF SYSTEMS: afebrile     MEDICATIONS  (STANDING):  aspirin enteric coated 81 milliGRAM(s) Oral daily  enoxaparin Injectable 40 milliGRAM(s) SubCutaneous every 24 hours  furosemide   Injectable 40 milliGRAM(s) IV Push once  hydrocortisone 50 milliGRAM(s) Oral every 8 hours  lactated ringers. 1000 milliLiter(s) (75 mL/Hr) IV Continuous <Continuous>  tobramycin 0.3% Ointment 1 Application(s) Left EYE every 6 hours    MEDICATIONS  (PRN):      CAPILLARY BLOOD GLUCOSE        I&O's Summary    08 Aug 2023 07:01  -  09 Aug 2023 07:00  --------------------------------------------------------  IN: 400 mL / OUT: 350 mL / NET: 50 mL        PHYSICAL EXAM:  Vital Signs Last 24 Hrs  T(C): 36.5 (09 Aug 2023 06:51), Max: 36.8 (08 Aug 2023 17:02)  T(F): 97.7 (09 Aug 2023 06:51), Max: 98.2 (08 Aug 2023 17:02)  HR: 93 (09 Aug 2023 06:51) (86 - 93)  BP: 133/76 (09 Aug 2023 06:51) (125/79 - 135/74)  BP(mean): --  RR: 18 (09 Aug 2023 06:51) (17 - 18)  SpO2: 97% (09 Aug 2023 06:51) (97% - 100%)    Parameters below as of 09 Aug 2023 06:51  Patient On (Oxygen Delivery Method): nasal cannula  O2 Flow (L/min): 2    CONSTITUTIONAL: NAD, well-developed  RESPIRATORY: Normal respiratory effort; lungs are clear to auscultation bilaterally  CARDIOVASCULAR: Regular rate and rhythm, normal S1 and S2, no murmur/rub/gallop; 1+ lower extremity edema; Peripheral pulses are 2+ bilaterally  ABDOMEN: Nontender to palpation, normoactive bowel sounds, no rebound/guarding; No hepatosplenomegaly  MUSCULOSKELETAL: no clubbing or cyanosis of digits; no joint swelling or tenderness to palpation  PSYCH: A+O to person, place, and time; affect appropriate    LABS:                        10.7   15.08 )-----------( 279      ( 09 Aug 2023 06:56 )             31.0     08-09    138  |  104  |  19  ----------------------------<  117<H>  4.5   |  24  |  1.47<H>    Ca    9.2      09 Aug 2023 06:56  Phos  3.2     08-09  Mg     1.90     08-09    TPro  6.5  /  Alb  3.5  /  TBili  0.2  /  DBili  x   /  AST  19  /  ALT  9   /  AlkPhos  30<L>  08-09          Urinalysis Basic - ( 09 Aug 2023 06:56 )    Color: x / Appearance: x / SG: x / pH: x  Gluc: 117 mg/dL / Ketone: x  / Bili: x / Urobili: x   Blood: x / Protein: x / Nitrite: x   Leuk Esterase: x / RBC: x / WBC x   Sq Epi: x / Non Sq Epi: x / Bacteria: x        Culture - Urine (collected 06 Aug 2023 20:31)  Source: Clean Catch Clean Catch (Midstream)  Final Report (08 Aug 2023 09:14):    <10,000 CFU/mL Normal Urogenital Roxy    Culture - Blood (collected 06 Aug 2023 18:13)  Source: .Blood Blood-Peripheral  Preliminary Report (08 Aug 2023 21:01):    No growth at 48 Hours    Culture - Blood (collected 06 Aug 2023 17:30)  Source: .Blood Blood-Peripheral  Preliminary Report (08 Aug 2023 21:01):    No growth at 48 Hours        RADIOLOGY & ADDITIONAL TESTS:  Results Reviewed:   Imaging Personally Reviewed:  < from: Transthoracic Echocardiogram (08.08.23 @ 15:07) >  CONCLUSIONS:  Technically very difficult study.  1. Mitral annular calcification, otherwise normal mitral  valve. Mild mitral regurgitation.  2. Endocardium not well visualized; unable to evaluate left  ventricular systolic function.  3. The right ventricle is not well visualized.  Consider limited repeat imaging with the intravenous  administration of ultrasound enhancing agent for improved  evaluation of LV systolic function, if clinically  indicated.        Electrocardiogram Personally Reviewed:    COORDINATION OF CARE:  Care Discussed with Consultants/Other Providers [Y/N]:  Prior or Outpatient Records Reviewed [Y/N]:

## 2023-08-09 NOTE — DISCHARGE NOTE NURSING/CASE MANAGEMENT/SOCIAL WORK - NSDCPEFALRISK_GEN_ALL_CORE
For information on Fall & Injury Prevention, visit: [email or web address] OR  [email or web address] OR  https://www.cdc.gov/steadi/patient.html

## 2023-08-09 NOTE — PROGRESS NOTE ADULT - PROBLEM SELECTOR PLAN 2
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

## 2023-08-09 NOTE — DISCHARGE NOTE PROVIDER - NSDCFUSCHEDAPPT_GEN_ALL_CORE_FT
Santos Miller  [facility]  ENDOCRIN [address]  Scheduled Appointment: 08/15/2023    [facility]  [facility]  Scheduled Appointment: 08/22/2023    [facility]  Scheduled Appointment: 08/22/2023    [facility]  [facility]  Scheduled Appointment: 09/19/2023    [facility]  [facility]  Scheduled Appointment: 09/19/2023    Sánchez Chou  [facility]  [facility]  Scheduled Appointment: 10/17/2023    [facility]  [facility]  Scheduled Appointment: 10/17/2023

## 2023-08-09 NOTE — DISCHARGE NOTE PROVIDER - CARE PROVIDER_API CALL
Santos Miller  Endocrinology/Metab/Diabetes  [address], [address]  [place] [address]  Phone: [phone]  Fax: [phone]  Follow Up Time:     Sánchez Chou  Medical Oncology  [address] [place] [address]  Phone: [phone]  Fax: [phone]  Follow Up Time:     Your Primary Care Provider,   Phone: (   )    -  Fax: (   )    -  Follow Up Time:

## 2023-08-09 NOTE — DISCHARGE NOTE PROVIDER - NSDCFUADDAPPT_GEN_ALL_CORE_FT
Continue medications as prescribed. Follow up with your primary care doctor, oncologist/hematologist, and endocrinologist for further evaluation and management. Please call to make an appointment within 1-2 weeks of discharge.    Continue medications as prescribed. Follow up with your primary care doctor, oncologist/hematologist, pulmonologist, and endocrinologist for further evaluation and management. Please call to make an appointment within 1-2 weeks of discharge.    Continue medications as prescribed. Follow up with your primary care doctor, oncologist/hematologist, pulmonologist, and endocrinologist for further evaluation and management. Please call to make an appointment within 1-2 weeks of discharge.       Pulmonary office to contact you for follow up appointment

## 2023-08-09 NOTE — PROGRESS NOTE ADULT - PROBLEM SELECTOR PLAN 4
Hold methimazole  FT4 low 0.7, TSH 3  - Recheck FT4 low 0.6, , TSH 0.86  - f/u TSH receptor Ab, TSI Ab  - may need synthroid, f/u endo

## 2023-08-09 NOTE — DISCHARGE NOTE PROVIDER - NSDCMRMEDTOKEN_GEN_ALL_CORE_FT
Ecotrin Adult Low Strength 81 mg oral delayed release tablet: 1 tab(s) orally once a day, am  hydrocortisone 10 mg oral tablet: 1 tab(s) orally once a day Please take this medication on 8/14/23 and on. Please take this medication at 8:00AM.  hydrocortisone 20 mg oral tablet: 1 tab(s) orally once a day Please take this medication on 8/12/23 and 8/13/23.  hydrocortisone 5 mg oral tablet: 2 tab(s) orally once a day Please take this medication on 8/14/23 and on. Please take this medication at 3:00PM.  hydrocortisone 5 mg oral tablet: 5 tab(s) orally once a day Please take this medication on 8/10/23 and 8/11/23.  tobramycin 0.3% ophthalmic ointment: 1 application to each affected eye every 6 hours Please administer to left. Last dose should be taken on 8/11/23.   Ecotrin Adult Low Strength 81 mg oral delayed release tablet: 1 tab(s) orally once a day, am  hydrocortisone 5 mg oral tablet: 1 tab(s) orally once a day 25mg (5 pills) once a day on 8/10/23 and 8/11/23  20mg (4 pills) once a day on 8/12/23 and 8/13/23  10mg (2 pills) once a day on 8/14/23 and on. Please take this dose at 8:00AM.  5mg (1 pills) once a day on 8/14/23 and on. Please take this dose at 3:00PM.  Solu-CORTEF Act-O-Vial 100 mg injection: 100 milligram(s) intramuscular once  tobramycin 0.3% ophthalmic ointment: 1 application to each affected eye every 6 hours Please administer to left. Last dose should be taken on 8/11/23.   albuterol 90 mcg/inh inhalation aerosol: 2 puff(s) inhaled every 6 hours as needed for Shortness of Breath  budesonide-formoterol 80 mcg-4.5 mcg/inh inhalation aerosol: 2 puff(s) inhaled 2 times a day  Ecotrin Adult Low Strength 81 mg oral delayed release tablet: 1 tab(s) orally once a day, am  hydrocortisone 5 mg oral tablet: 4 tab(s) orally 2 times a day Taper:  - 25mg (5 tabs) x 2 times a day x 1 day (8/11)   - 20mg (4tabs) x 2 times a day x 2 days (8/12-/13)   - 20mg (4 tabs) x once a day at 8am &amp; 10mg (2 tab) at 3pm - indefinitely  tobramycin 0.3% ophthalmic ointment: 1 application to each affected eye every 6 hours Please administer to left. Last dose should be taken on 8/11/23.   budesonide-formoterol 160 mcg-4.5 mcg/inh inhalation aerosol: 2 puff(s) inhaled 2 times a day  Ecotrin Adult Low Strength 81 mg oral delayed release tablet: 1 tab(s) orally once a day, am  hydrocortisone 10 mg oral tablet: 2 tab(s) orally 2 times a day 1 tablets twice daily for 8/12 and 8/13 . Starting 8/14 take 2 tablets every morning and 1 tablet every night  hydrocortisone 10 mg oral tablet: 2 tab(s) orally 2 times a day 1 tablets twice daily for 8/12 and 8/13 . Starting 8/14 take 2 tablets every morning and 1 tablet every night  Proventil HFA 90 mcg/inh inhalation aerosol: 2 puff(s) inhaled every 4 hours as needed for  bronchospasm  Solu-CORTEF Act-O-Vial 100 mg injection: 100 milligram(s) intramuscular once http://www.addisoncrisis.info/emergency-injection/emergency-injection-cortico-steroids-solu-cortef-act-o-vial-two-chamber-ampul/  tiotropium 2.5 mcg/inh inhalation aerosol: 2 puff(s) inhaled once a day  tobramycin 0.3% ophthalmic ointment: 1 application to each affected eye every 6 hours Please administer to left. Last dose should be taken on 8/11/23.

## 2023-08-09 NOTE — DISCHARGE NOTE PROVIDER - NSDCFUADDINST_GEN_ALL_CORE_FT
Repeat prolactin and macroprolactin in 1-2 weeks outpatient at your endocrine appointment     To help with discharge planning, Please print and give the pt info section for patients under adrenal insufficiency (this will educate the patient regarding the warning signs of adrenal crisis )   -  patient that they should take 2-3x her home dose in cases of illness, fever, accidents, and surgery  - pt should receive stress dosing (hydrocortisone 50mg every 8 hours ) with any major illness or surgical procedure  - Should pt be unable to tolerate PO and is unable to take hydrocortisone, they will need an emergency injection. Will  discharge with a prescription for 100 mg Solu-Cortef Act-O-Vial and the following instructions:  http://www.addisoncrisis.info/emergency-injection/emergency-injection-cortico-steroids-solu-cortef-act-o-vial-two-chamber-ampul/  - pt should obtain a medical alert bracelet or necklace to inform emergency providers that they have adrenal insufficiency  [email or web address]  - Patient should also monitor BP closely at home

## 2023-08-09 NOTE — DISCHARGE NOTE PROVIDER - NSDCCPCAREPLAN_GEN_ALL_CORE_FT
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 Follow up with your oncologist and endocrinologist. You were started on steroid, continue steroid taper as instructed: hydrocortisone 25mg twice a day for 2 days, then 20mg twice a day for 2 days and finally 20 mg at 8am and 10 mg at 3pm indefinitely, follow up with your endocrinolgoist.      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 Follow up with your oncologist and endocrinologist. You were started on steroid, continue steroid taper as instructed: hydrocortisone 20mg  twice a day for 1 more day, then 20 mg at 8am and 10 mg at 3pm indefinitely, follow up with your endocrinolgoist.      SECONDARY DISCHARGE DIAGNOSES  Diagnosis: Hyperthyroidism  Assessment and Plan of Treatment: we stopped methimazole, follow up with your endocrinologist      Diagnosis: Hypertension  Assessment and Plan of Treatment: We stopped your losartan because blood pressure is low, follow up with primary physician    Diagnosis: SIRI (acute kidney injury)  Assessment and Plan of Treatment: your kidney function improved with steroid and fluids    Diagnosis: Dyspnea  Assessment and Plan of Treatment: you likely has emphysema ,you will be discharged on albuterol inhaler, spiriva and symbicort, you don't qualify for home oxygen at this time     PRINCIPAL DISCHARGE DIAGNOSIS  Diagnosis: Adrenal insufficiency  Assessment and Plan of Treatment: 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 Follow up with your oncologist and endocrinologist. You were started on steroid, continue steroid taper as instructed: hydrocortisone 20mg  twice a day for 1 more day, then 20 mg at 8am and 10 mg at 3pm indefinitely, follow up with your endocrinolgoist.        SECONDARY DISCHARGE DIAGNOSES  Diagnosis: Hyperthyroidism  Assessment and Plan of Treatment: we stopped methimazole, follow up with your endocrinologist      Diagnosis: Hypertension  Assessment and Plan of Treatment: We stopped your losartan because blood pressure is low, follow up with primary physician    Diagnosis: SIRI (acute kidney injury)  Assessment and Plan of Treatment: your kidney function improved with steroid and fluids    Diagnosis: Dyspnea  Assessment and Plan of Treatment: you likely has emphysema ,you will be discharged on albuterol inhaler, spiriva and symbicort, you don't qualify for home oxygen at this time     PRINCIPAL DISCHARGE DIAGNOSIS  Diagnosis: Adrenal insufficiency  Assessment and Plan of Treatment: 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 Follow up with your oncologist and endocrinologist. You were started on steroid, continue steroid taper as instructed: hydrocortisone 20mg  twice a day for 1 more day, then 20 mg at 8am and 10 mg at 3pm indefinitely, follow up with your endocrinolgoist.  in the event of an emergency where you are unable to take oral medication , prescription for steroid injection given in case. Follow up with your pharmacist/ website given for further information         SECONDARY DISCHARGE DIAGNOSES  Diagnosis: Hyperthyroidism  Assessment and Plan of Treatment: we stopped methimazole, follow up with your endocrinologist      Diagnosis: Hypertension  Assessment and Plan of Treatment: We stopped your losartan because blood pressure is low, follow up with primary physician    Diagnosis: SIRI (acute kidney injury)  Assessment and Plan of Treatment: your kidney function improved with steroid and fluids    Diagnosis: Dyspnea  Assessment and Plan of Treatment: you likely has emphysema ,you will be discharged on albuterol inhaler, spiriva and symbicort, you don't qualify for home oxygen at this time

## 2023-08-09 NOTE — CHART NOTE - NSCHARTNOTEFT_GEN_A_CORE
Patient has a medical history of lung cancer. Patient oxygen saturation while on room air at rest is 95%. Patient oxygen saturation while on room and ambulating is 86%. Patient oxygen saturation while on 2L Oxygen and ambulating is 95%.

## 2023-08-09 NOTE — PROGRESS NOTE ADULT - ASSESSMENT
Patient is a 67y F PMHx HTN, small cell lung cancer (s/p chemo and immunotherapy, now on maintenance Atezolizumab as of Nov 2022) , presenting with lightheadedness for 2 days and intermittent diarrhea. Pt hypotensive at urgent care with BP 81/54 in ED, minimal improvement following multiple fluid boluses, with serum cortisol 0.5, consistent with adrenal insufficiency. Patient now on hydrocortisone 50 mg with positive response, BP 120s systolic, as well as symptomatic improvement.    #Adrenal Insufficiency - suspect secondary AI vs. hypophysitis (however pt does not have headaches which is typical of the latter  #Hypotension - adrenal crisis, now improved with steroids  - Pt with serum cortisol 0.5, diagnostic of adrenal insufficiency, which is a known complication of checkpoint inhibitor immunotherapy. There is not need for cosyntropin testing at this point.  - ACTH <2 on 8/8 (however on steroids), will treat as presumably secondary adrenal insufficiency (central issue).   - Considering positive response to hydrocortisone today 8/8, taper to hydrocortisone 50mg q8 PO. Monitor BP and symptoms for improvement. - check LH, FSH, Free T4, IGF-1, ACTH, estradiol, and prolactin with morning labs. If LH/FSH are low fits a central picture since pt is menopausal  - can consider pituitary MRI, however patient has issues w/ claustrophobia as per primary team -> can plan to address as outpatient and treat hormonally as above especially since she does not have typical headache presentation with hypophysitis   - will plan to d/c pt on PO hydrocortisone 20 mg at 8 am and 10 mg at 2 pm     To help with discharge planning, Please print and give the pt info section for patients under adrenal insufficiency (this will educate the patient regarding the warning signs of adrenal crisis )   -  patient that they should take 2-3x her home dose in cases of illness, fever, accidents, and surgery  - pt should receive stress dosing (hydrocortisone 50mg q8) with any major illness or surgical procedure  - Should pt be unable to tolerate PO and is unable to take hydrocortisone, they will need an emergency injection. Please discharge with a prescription for 100 mg Solu-Cortef Act-O-Vial and the following instructions:  [email or web address]  - pt should obtain a medical alert bracelet or necklace to inform emergency providers that they have adrenal insufficiency  [email or web address]  - Patient should also monitor BP closely at home    #Hyperthyroidism   - Patient unsure as to cause of hyperthyroidism, denies any active symptoms. Enlarged thyroid on exam.   - TSH NORMAL 3.07, free thyroxine LOW at 0.7 on 8/7  - abnormal TFTs could be due to overtreatment of hyperthyroidism (although would suspect TSH to be high), nonthyroidal illness,  vs hypophysitis i/s/o checkpoint inhibitor therapy with low FT4 from central process  - On methimazole 5mg at home; dose held today.   - hold methimazole for now  - Recheck FT4 on Wednesday, 8/9  - Please order TSH receptor Ab, Thyroid stimulating immunoglobulin Ab Patient is a 67y F PMHx HTN, small cell lung cancer (s/p chemo and immunotherapy, now on maintenance Atezolizumab as of 2022) , presenting with lightheadedness for 2 days and intermittent diarrhea. Pt hypotensive at urgent care with BP 81/54 in ED, minimal improvement following multiple fluid boluses, with serum cortisol 0.5, consistent with adrenal insufficiency. Patient on hydrocortisone 50 mg q8 with positive response, BP [id number] systolic, as well as symptomatic improvement.    #Adrenal Insufficiency - suspect secondary AI vs. hypophysitis (however pt does not have headaches which is typical of the latter  #Hypotension - adrenal crisis, now improved with steroids  - Pt with serum cortisol 0.5, diagnostic of adrenal insufficiency, which is a known complication of checkpoint inhibitor immunotherapy. There is not need for cosyntropin testing at this point.  - ACTH <2 on  (however on steroids), will treat as presumably secondary adrenal insufficiency (central issue).   - Patient positive response to hydrocortisone. Please taper hydrocortisone to  50mg BID PO. Monitor BP and symptoms for improvement.   - Low LH/FSH, consistent with central picture as pt is menopausal.  - Free T4 0.6  (has been <1 since admission)   - can consider pituitary MRI, however patient has issues w/ claustrophobia as per primary team -> can plan to address as outpatient and treat hormonally as above especially since she does not have typical headache presentation with hypophysitis   - Discharge plannin mg BID x 2 days --> 20 mg BID x 2 days --> 20 mg AM, 10 mg PM indefinitely. Patient will need to repeat free T4 within 1-2 weeks of discharge with outpatient endocrinologist, Dr. Santos Miller     To help with discharge planning, Please print and give the pt info section for patients under adrenal insufficiency (this will educate the patient regarding the warning signs of adrenal crisis )   -  patient that they should take 2-3x her home dose in cases of illness, fever, accidents, and surgery  - pt should receive stress dosing (hydrocortisone 50mg q8) with any major illness or surgical procedure  - Should pt be unable to tolerate PO and is unable to take hydrocortisone, they will need an emergency injection. Please discharge with a prescription for 100 mg Solu-Cortef Act-O-Vial and the following instructions:  [email or web address]/[email or web address]-[email or web address]-[email or web address]-[email or web address]-act-[email or web address]-vial-[email or web address]-chamber-ampul/  - pt should obtain a medical alert bracelet or necklace to inform emergency providers that they have adrenal insufficiency  [email or web address]  - Patient should also monitor BP closely at home    #Hyperthyroidism   - Patient unsure as to cause of hyperthyroidism, denies any active symptoms. Enlarged thyroid on exam.   - TSH NORMAL 3.07, free thyroxine LOW at 0.8  (<1 since admission)  - abnormal TFTs could be due to overtreatment of hyperthyroidism (although would suspect TSH to be high), nonthyroidal illness,  vs hypophysitis i/s/o checkpoint inhibitor therapy with low FT4 from central process  - On methimazole 5mg at home; held since patient admitted  - Continue to methimazole upon discharge   - Please order TSH receptor Ab, Thyroid stimulating immunoglobulin Ab Patient is a 67y F PMHx HTN, small cell lung cancer (s/p chemo and immunotherapy, now on maintenance Atezolizumab as of 2022) , presenting with lightheadedness for 2 days and intermittent diarrhea. Pt hypotensive at urgent care with BP 81/54 in ED, minimal improvement following multiple fluid boluses, with serum cortisol 0.5, consistent with adrenal insufficiency. Patient on hydrocortisone 50 mg q8 with positive response, BP [id number] systolic, as well as symptomatic improvement.    #Adrenal Insufficiency - suspect secondary AI vs. hypophysitis (however pt does not have headaches which is typical of the latter  #Hypotension - adrenal crisis, now improved with steroids  - Pt with serum cortisol 0.5, diagnostic of adrenal insufficiency, which is a known complication of checkpoint inhibitor immunotherapy. There is not need for cosyntropin testing at this point.  - ACTH <2 on  (however on steroids), will treat as presumably secondary adrenal insufficiency (central issue).   - Patient exhibiting positive response to hydrocortisone. Please taper hydrocortisone to 50mg BID PO today . Monitor BP and symptoms for improvement.   - Low LH/FSH, consistent with central picture as pt is menopausal.  - Free T4 0.6  (has been <1 since admission)   - can consider pituitary MRI, however patient has issues w/ claustrophobia as per primary team -> can plan to address as outpatient and treat hormonally as above especially since she does not have typical headache presentation with hypophysitis   - Discharge plannin mg BID x 2 days --> 20 mg BID x 2 days --> 20 mg AM, 10 mg PM indefinitely. Patient will need to repeat free T4 within 1-2 weeks of discharge with outpatient endocrinologist, Dr. Santos Miller     To help with discharge planning, Please print and give the pt info section for patients under adrenal insufficiency (this will educate the patient regarding the warning signs of adrenal crisis )   -  patient that they should take 2-3x her home dose in cases of illness, fever, accidents, and surgery  - pt should receive stress dosing (hydrocortisone 50mg q8) with any major illness or surgical procedure  - Should pt be unable to tolerate PO and is unable to take hydrocortisone, they will need an emergency injection. Please discharge with a prescription for 100 mg Solu-Cortef Act-O-Vial and the following instructions:  [email or web address]/[email or web address]-injection/[email or web address]-injection-[email or web address]-solu-cortef-act-[email or web address]-vial-[email or web address]-chamber-ampul/  - pt should obtain a medical alert bracelet or necklace to inform emergency providers that they have adrenal insufficiency  [email or web address]  - Patient should also monitor BP closely at home    #Hyperthyroidism   - Patient unsure as to cause of hyperthyroidism, denies any active symptoms. Enlarged thyroid on exam.   - TSH NORMAL 3.07, free thyroxine LOW at 0.8  (<1 since admission)  - abnormal TFTs could be due to overtreatment of hyperthyroidism (although would suspect TSH to be high), nonthyroidal illness,  vs hypophysitis i/s/o checkpoint inhibitor therapy with low FT4 from central process  - On methimazole 5mg at home; held since patient admitted  - Continue to methimazole upon discharge   - Please order TSH receptor Ab, Thyroid stimulating immunoglobulin Ab

## 2023-08-09 NOTE — PROGRESS NOTE ADULT - SUBJECTIVE AND OBJECTIVE BOX
HPI:  Patient is a 67y F PMHx HTN, Lung CA (on chemo, last 7/22/23), p/w lightheadedness for 2 days. Patient reports that she has been having intermittent episodes of diarrhea since 8/3. Her son, whom she lives with, initially experienced diarrhea for approximately a week prior. Pt denies recent travel hx or other events. Pt has also had decreased PO intake since then as well. She presented to urgent care because she felt lightheadeded and was noted to be hypotensive, was instructed to come to hospital. Pt endorses a chronic dry cough. She denies fevers, chest pain, dyspnea, nausea, vomiting, melena/hematochezia, abdominal pain and dysuria. (07 Aug 2023 02:21)    Reason for consult: Adrenal insufficiency, Hx of hyperthyroidism    Patient was diagnosed with small cell lung cancer in July 2022. She has been treated with maintenance Atezolizumab since 11/2022 (last dose per pt was in 6/2023) and denies experiencing adverse effects until now. Pt endorses throwing up once yesterday evening 8/6 as well as multiple days of intermittent diarrhea and fatigue. She denies Hx of adrenal issues or known pituitary tumor in the past as well as family history of these conditions. Her BP is generally around 110/70 at home and she denies previous episodes of hypotension. She does not usually take steroids. She does not have headaches.     Regarding her hyperthyroidism, the patient states she has been following with a [facility] endocrinologist and takes methimazole 5mg, which was started several years ago. She does not recall having a history of Graves or nodules. She is not sure about thyroid antibodies. She denies other autoimmune conditions in her self. Notes that daughter has hypothyroidism. She endorses weight loss over the last few years which her and daughter acknowledge may also be due to her cancer. She denies other symptoms of hyperthyroidism such as feeling hot/cold and palpitations.     Of note, pt reports she can only get standing/open MRI due to claustrophobia.  ---  Overnight events/interval history: Patient continues to feel well and reports increased energy, no acute complaints. No vomiting.      PAST MEDICAL & SURGICAL HISTORY:  HTN (hypertension)    Hyperthyroidism    Lung cancer    History of cholecystectomy  1989    FAMILY HISTORY:  FH: HTN (hypertension) (Mother)    Outpatient Medications:    MEDICATIONS  (STANDING):  aspirin enteric coated 81 milliGRAM(s) Oral daily  enoxaparin Injectable 40 milliGRAM(s) SubCutaneous every 24 hours  hydrocortisone sodium succinate Injectable 50 milliGRAM(s) IV Push every 6 hours  lactated ringers. 1000 milliLiter(s) (75 mL/Hr) IV Continuous <Continuous>  piperacillin/tazobactam IVPB.. 3.375 Gram(s) IV Intermittent every 8 hours  tobramycin 0.3% Ointment 1 Application(s) Left EYE every 6 hours    MEDICATIONS  (PRN):    Allergies    Tylenol (Stomach Upset)    Intolerances    Review of Systems:  Constitutional: No fever  Eyes: No blurry vision  Neuro: No tremors  HEENT: No pain  Cardiovascular: No chest pain, palpitations  Respiratory: No SOB, no cough  GI: +Vomiting, diarrhea.   : No dysuria  Skin: no rash  Psych: no depression  Endocrine: no polyuria, polydipsia  Hem/lymph: +Weight loss. no swelling  Osteoporosis: no fractures    ALL OTHER SYSTEMS REVIEWED AND NEGATIVE    PHYSICAL EXAM:    Vital Signs Last 24 Hrs  T(C): 36.5 (09 Aug 2023 06:51), Max: 36.8 (08 Aug 2023 17:02)  T(F): 97.7 (09 Aug 2023 06:51), Max: 98.2 (08 Aug 2023 17:02)  HR: 93 (09 Aug 2023 06:51) (86 - 93)  BP: 133/76 (09 Aug 2023 06:51) (125/79 - 135/74)  RR: 18 (09 Aug 2023 06:51) (17 - 18)  SpO2: 97% (09 Aug 2023 06:51) (97% - 100%)    Parameters below as of 09 Aug 2023 06:51  Patient On (Oxygen Delivery Method): nasal cannula  O2 Flow (L/min): 2    GENERAL: NAD, well-groomed, well-developed  EYES: No proptosis, no lid lag, anicteric  HEENT:  Atraumatic, Normocephalic, moist mucous membranes  THYROID: Enlarged, no palpable nodules  RESPIRATORY: Clear to auscultation bilaterally; No rales, rhonchi, wheezing  CARDIOVASCULAR: Regular rate and rhythm; No murmurs; no peripheral edema  GI: Soft, nontender, non distended, normal bowel sounds  SKIN: Dry, intact, No rashes or lesions  MUSCULOSKELETAL: Full range of motion, normal strength  NEURO: sensation intact, extraocular movements intact, no tremor  PSYCH: Alert and oriented x 3, normal affect, normal mood  CUSHING'S SIGNS: no striae      LABS:                             10.7   15.08 )-----------( 279      ( 09 Aug 2023 06:56 )             31.0     08-09    138  |  104  |  19  ----------------------------<  117<H>  4.5   |  24  |  1.47<H>    Ca    9.2      09 Aug 2023 06:56  Phos  3.2     08-09  Mg     1.90     08-09    TPro  6.5  /  Alb  3.5  /  TBili  0.2  /  DBili  x   /  AST  19  /  ALT  9   /  AlkPhos  30<L>  08-09      Thyroid Function Tests: Free T4, Serum: 0.6 (08-09); TSH, Serum: 0.86 (08-08)    Leuteinizing Hormone, Serum: 23.7 (08-08); Follicle Stimulating Hormone, Serum: 20.3 (-8-08); Estradiol, Serum: 33 (08-08); Prolactin, Serum: 25.7 (08-08). ACTH, Serum: 1.8 (08-08); IGF-1: 58 (08-08)    Cortisol AM, Serum: 162.9 (08-08)               HPI:  Patient is a 67y F PMHx HTN, Lung CA (on chemo, last 7/22/23), p/w lightheadedness for 2 days. Patient reports that she has been having intermittent episodes of diarrhea since 8/3. Her son, whom she lives with, initially experienced diarrhea for approximately a week prior. Pt denies recent travel hx or other events. Pt has also had decreased PO intake since then as well. She presented to urgent care because she felt lightheadeded and was noted to be hypotensive, was instructed to come to hospital. Pt endorses a chronic dry cough. She denies fevers, chest pain, dyspnea, nausea, vomiting, melena/hematochezia, abdominal pain and dysuria. (07 Aug 2023 02:21)    Reason for consult: Adrenal insufficiency, Hx of hyperthyroidism    Patient was diagnosed with small cell lung cancer in July 2022. She has been treated with maintenance Atezolizumab since 11/2022 (last dose per pt was in 6/2023) and denies experiencing adverse effects until now. Pt endorses throwing up once yesterday evening 8/6 as well as multiple days of intermittent diarrhea and fatigue. She denies Hx of adrenal issues or known pituitary tumor in the past as well as family history of these conditions. Her BP is generally around 110/70 at home and she denies previous episodes of hypotension. She does not usually take steroids. She does not have headaches.     Regarding her hyperthyroidism, the patient states she has been following with a [facility] endocrinologist and takes methimazole 5mg, which was started several years ago. She does not recall having a history of Graves or nodules. She is not sure about thyroid antibodies. She denies other autoimmune conditions in her self. Notes that daughter has hypothyroidism. She endorses weight loss over the last few years which her and daughter acknowledge may also be due to her cancer. She denies other symptoms of hyperthyroidism such as feeling hot/cold and palpitations.     Of note, pt reports she can only get standing/open MRI due to claustrophobia.  ---  Overnight events/interval history: Patient feels well, energetic. No n/v/d, no abdominal pain.  Hopeful for d/c.       PAST MEDICAL & SURGICAL HISTORY:  HTN (hypertension)    Hyperthyroidism    Lung cancer    History of cholecystectomy  1989    FAMILY HISTORY:  FH: HTN (hypertension) (Mother)    Outpatient Medications:    MEDICATIONS  (STANDING):  aspirin enteric coated 81 milliGRAM(s) Oral daily  enoxaparin Injectable 40 milliGRAM(s) SubCutaneous every 24 hours  hydrocortisone sodium succinate Injectable 50 milliGRAM(s) IV Push every 6 hours  lactated ringers. 1000 milliLiter(s) (75 mL/Hr) IV Continuous <Continuous>  piperacillin/tazobactam IVPB.. 3.375 Gram(s) IV Intermittent every 8 hours  tobramycin 0.3% Ointment 1 Application(s) Left EYE every 6 hours    MEDICATIONS  (PRN):    Allergies    Tylenol (Stomach Upset)    Intolerances    Review of Systems:  Constitutional: No fever  Eyes: No blurry vision  Neuro: No tremors  HEENT: No pain  Cardiovascular: No chest pain, palpitations  Respiratory: No SOB, no cough  GI: No vomiting, diarrhea. +Constipation  : No dysuria  Skin: no rash  Psych: no depression  Endocrine: no polyuria, polydipsia  Hem/lymph: +Weight loss. no swelling  Osteoporosis: no fractures    ALL OTHER SYSTEMS REVIEWED AND NEGATIVE    PHYSICAL EXAM:    Vital Signs Last 24 Hrs  T(C): 36.5 (09 Aug 2023 06:51), Max: 36.8 (08 Aug 2023 17:02)  T(F): 97.7 (09 Aug 2023 06:51), Max: 98.2 (08 Aug 2023 17:02)  HR: 93 (09 Aug 2023 06:51) (86 - 93)  BP: 133/76 (09 Aug 2023 06:51) (125/79 - 135/74)  RR: 18 (09 Aug 2023 06:51) (17 - 18)  SpO2: 97% (09 Aug 2023 06:51) (97% - 100%)    Parameters below as of 09 Aug 2023 06:51  Patient On (Oxygen Delivery Method): nasal cannula  O2 Flow (L/min): 2    GENERAL: NAD, well-groomed, well-developed  EYES: No proptosis, no lid lag, anicteric  HEENT:  Atraumatic, Normocephalic, moist mucous membranes  THYROID: Enlarged, no palpable nodules  RESPIRATORY: Clear to auscultation bilaterally; No rales, rhonchi, wheezing  CARDIOVASCULAR: Regular rate and rhythm; No murmurs; no peripheral edema  GI: Soft, nontender, non distended, normal bowel sounds  SKIN: Dry, intact, No rashes or lesions  MUSCULOSKELETAL: Full range of motion, normal strength  NEURO: sensation intact, extraocular movements intact, no tremor  PSYCH: Alert and oriented x 3, normal affect, normal mood  CUSHING'S SIGNS: no striae      LABS:                             10.7   15.08 )-----------( 279      ( 09 Aug 2023 06:56 )             31.0     08-09    138  |  104  |  19  ----------------------------<  117<H>  4.5   |  24  |  1.47<H>    Ca    9.2      09 Aug 2023 06:56  Phos  3.2     08-09  Mg     1.90     08-09    TPro  6.5  /  Alb  3.5  /  TBili  0.2  /  DBili  x   /  AST  19  /  ALT  9   /  AlkPhos  30<L>  08-09      Thyroid Function Tests: Free T4, Serum: 0.6 (08-09); TSH, Serum: 0.86 (08-08)    Leuteinizing Hormone, Serum: 23.7 (08-08); Follicle Stimulating Hormone, Serum: 20.3 (-8-08); Estradiol, Serum: 33 (08-08); Prolactin, Serum: 25.7 (08-08). ACTH, Serum: 1.8 (08-08); IGF-1: 58 (08-08)    Cortisol AM, Serum: 162.9 (08-08)

## 2023-08-09 NOTE — DISCHARGE NOTE NURSING/CASE MANAGEMENT/SOCIAL WORK - PATIENT PORTAL LINK FT
You can access the FollowMyHealth Patient Portal offered by [facility] by registering at the following website: [email or web address]. By joining [employer]’s FollowMyHealth portal, you will also be able to view your health information using other applications (apps) compatible with our system.

## 2023-08-09 NOTE — DISCHARGE NOTE PROVIDER - CARE PROVIDERS DIRECT ADDRESSES
,DirectAddress_Unknown,[email or web address],DirectAddress_Unknown

## 2023-08-09 NOTE — DISCHARGE NOTE PROVIDER - HOSPITAL COURSE
67y F PMHx HTN, Lung CA (on chemo, last 7/22/23), presents with complaints of being lightheaded, body aches, cough and chills x1, found to have shock likely d/t adrenal insufficiency, likely related to immunotherapy      Problem/Plan - 1   ·  Problem: Sepsis.   ·  Plan: sepsis ruled out, abx dc'd per ID  - pt confirmed to have shock d/t adrenal insufficiency, cortisol low 0.5 , also may have nephritis /colitis from immunotherapy as well,  taper steroid per endo,  hydrocortisone 50mg bid on date of discharge  then starting 8/10 taper to hydrocortisone 25 mg bid x2 days, then 20 mg bid x 2 days, then 20 mg at 8am and 10mg at 3pm indefinitely  patient needs to f/u with her outpt [facility] endocrinologist  - need to r/o hypophysitis, checked pituitary hormone levels  ACTH low 1.8 c/w secondary adrenal insufficiency, other hormones okay (prolactin 25.7, TSH 0.86, FT4 low 0.6, LH 23.7, FSH 20.3, estradiol 33  - s/p fluid resuscitation, has peripheral edema and dyspnea, on 2L NC,  given lasix 40mg iv x1, repeat CXR clear lungs. However, pt desaturated to 86% on RA with ambulation, will need home oxygen     Problem/Plan - 2   ·  Problem: SIRI (acute kidney injury).   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    Problem/Plan - 3   ·  Problem: Lung cancer.   ·  Plan: Stage IV small cell lung cancer  - being treated by Dr. Sánchez Chou   - Began first line chemo + immunotherapy with Carboplatin/Etoposide/Atezolizumab in August 2022; achieved [place]. - Completed 4 cycles of chemo + immunotherapy in Oct 2022 followed by maintenance immunotherapy Atezolizumab as of Nov 2022, had cycle 14 on 7/25  - Restaging PET/CT June 2023 with overall sustained systemic response.   - Oncology following, no plan for inpt chemo or inpt MRI.    Problem/Plan - 4   ·  Problem: Hyperthyroidism.   ·  Plan: Hold methimazole  FT4 low 0.7, TSH 3  - Recheck FT4 low 0.6, , TSH 0.86  - f/u TSH receptor Ab, TSI Ab  - may need synthroid, f/u endo.    Problem/Plan - 5   ·  Problem: Hypertension.   ·  Plan: Pt with hx of hypertension  -meds: losartan 100  -hold in the setting of hypotension, siri, adrenal insufficiency. 67y F PMHx HTN, Lung CA (on chemo, last 7/22/23), presents with complaints of being lightheaded, body aches, cough and chills x1, found to have shock likely d/t adrenal insufficiency, likely related to immunotherapy, course c/b hypoxia and dyspnea likely d/t undiagnosed COPD , Improved w/ bronchodilator       Problem/Plan - 1:  ·  Problem: Sepsis.   ·  Plan: sepsis ruled out, abx dc'd per ID  - pt confirmed to have shock d/t adrenal insufficiency, cortisol low 0.5 , also may have nephritis /colitis from immunotherapy as well,  taper steroid per endo, on date of discharge on 8/12, hydrocortisone 20mg bid today and tomorrow, then 20 mg 8 am and 10mg pm indefinitely, outpt f/u with her endocrinologist Dr. Santos Miller --messaged on [employer].   - need to r/o hypophysitis, checked pituitary hormone levels  ACTH low 1.8 c/w secondary adrenal insufficiency, other hormones okay (prolactin 25.7, TSH 0.86, FT4 low 0.6, LH 23.7, FSH 20.3, estradiol 33  - TTE (8/8) limited, mild MR, unable to evaluate LV/RV function  - labs reviewed   -Hold methimazole, outpt f/u endo  -UCx normal bianca, UA not impressive; Bcx ngtd  -stool neg for C. diff, check GI PCR if diarrhea recurs  -resolved left eye conjunctivitis, c/w tobramycin eye drop x7 days, ID signed off  - dispo: DC home today, outpt f/u pulm, endo, PCP  please Email to [email or web address]   Time spent on discharge 32 minutes coordinating discharge plan and discussing with patient and family.     Problem/Plan - 2:  ·  Problem: SIRI (acute kidney injury).   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     Problem/Plan - 3:  ·  Problem: Lung cancer.   ·  Plan: Stage IV small cell lung cancer  - being treated by Dr. Sánchez Chou   - Began first line chemo + immunotherapy with Carboplatin/Etoposide/Atezolizumab in August 2022; achieved [place]. - Completed 4 cycles of chemo + immunotherapy in Oct 2022 followed by maintenance immunotherapy Atezolizumab as of Nov 2022, had cycle 14 on 7/25  - Restaging PET/CT June 2023 with overall sustained systemic response.   - Oncology following, no plan for inpt chemo or inpt MRI.     Problem/Plan - 4:  ·  Problem: Hyperthyroidism.   ·  Plan: Hold methimazole  FT4 low 0.7, TSH 3  - Recheck FT4 low 0.6, , TSH 0.86  - f/u TSH receptor Ab, TSI Ab neg  - DC methimazole, outpt f/u endo  - repeat prolactin and macroprolactin in 1-2 weeks as out , prolactin 25.7 on 8/8, asymptomatic.     Problem/Plan - 5:  ·  Problem: Dyspnea.   ·  Plan: pt reports exertional dyspnea, satting 100% on RA at rest, but desat to 89% after ambulation on RA today, home O2 arranged by CM, hx of lung ca and former smoker 1/2 ppd x30 yrs.  Pt denies h/o COPD, never had PFT done before.   yesterday morning pt had scattered wheezing in am, today lungs clear, breathing improved w/ bronchodilator  O2 sat % on RA, after ambulation 93%  doesn't qualify for home O2 per criteria   - seen by pulm yesterday, recs appreciated, repeat CXR no acute infiltrate. Pt does have hyperinflated lungs c/w COPD, also prior CT showed emphysema, so she has COPD.  -d-dimer 163 wnl, low suspicion for PE, no need for CTPA  -discharge home on albuterol inhaler, symbicort and spiriva , outpt f/u pulm.     Problem/Plan - 6:  ·  Problem: Hypertension.   ·  Plan: Pt with hx of hypertension  -meds: losartan 100  -hold in the setting of hypotension, adrenal insufficiency.   67y F PMHx HTN, Lung CA (on chemo, last 7/22/23), presents with complaints of being lightheaded, body aches, cough and chills x1, found to have shock likely d/t adrenal insufficiency, likely related to immunotherapy, course c/b hypoxia and dyspnea likely d/t undiagnosed COPD , Improved w/ bronchodilator       Problem/Plan - 1:  ·  Problem: Sepsis.   ·  Plan: sepsis ruled out, abx dc'd per ID  - pt confirmed to have shock d/t adrenal insufficiency, cortisol low 0.5 , also may have nephritis /colitis from immunotherapy as well,  taper steroid per endo, on date of discharge on 8/12, hydrocortisone 20mg bid today and tomorrow, then 20 mg 8 am and 10mg pm indefinitely, outpt f/u with her endocrinologist Dr. Santos Miller --messaged on [employer].   - need to r/o hypophysitis, checked pituitary hormone levels  ACTH low 1.8 c/w secondary adrenal insufficiency, other hormones okay (prolactin 25.7, TSH 0.86, FT4 low 0.6, LH 23.7, FSH 20.3, estradiol 33  - TTE (8/8) limited, mild MR, unable to evaluate LV/RV function  - labs reviewed   -Hold methimazole, outpt f/u endo  -UCx normal bianca, UA not impressive; Bcx ngtd  -stool neg for C. diff, check GI PCR if diarrhea recurs  -resolved left eye conjunctivitis, c/w tobramycin eye drop x7 days, ID signed off  - dispo: DC home today, outpt f/u pulm, endo, PCP  please Email to [email or web address]   Time spent on discharge 32 minutes coordinating discharge plan and discussing with patient and family.     Problem/Plan - 2:  ·  Problem: SIRI (acute kidney injury).   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     Problem/Plan - 3:  ·  Problem: Lung cancer.   ·  Plan: Stage IV small cell lung cancer  - being treated by Dr. Sánchez Chou   - Began first line chemo + immunotherapy with Carboplatin/Etoposide/Atezolizumab in August 2022; achieved [place]. - Completed 4 cycles of chemo + immunotherapy in Oct 2022 followed by maintenance immunotherapy Atezolizumab as of Nov 2022, had cycle 14 on 7/25  - Restaging PET/CT June 2023 with overall sustained systemic response.   - Oncology following, no plan for inpt chemo or inpt MRI.     Problem/Plan - 4:  ·  Problem: Hyperthyroidism.   ·  Plan: Hold methimazole  FT4 low 0.7, TSH 3  - Recheck FT4 low 0.6, , TSH 0.86  - f/u TSH receptor Ab, TSI Ab neg  - DC methimazole, outpt f/u endo  - repeat prolactin and macroprolactin in 1-2 weeks as out , prolactin 25.7 on 8/8, asymptomatic.     Problem/Plan - 5:  ·  Problem: Dyspnea.   ·  Plan: pt reports exertional dyspnea, satting 100% on RA at rest, but desat to 89% after ambulation on RA today, home O2 arranged by CM, hx of lung ca and former smoker 1/2 ppd x30 yrs.  Pt denies h/o COPD, never had PFT done before.   yesterday morning pt had scattered wheezing in am, today lungs clear, breathing improved w/ bronchodilator  O2 sat % on RA, after ambulation 93%  doesn't qualify for home O2 per criteria   - seen by pulm yesterday, recs appreciated, repeat CXR no acute infiltrate. Pt does have hyperinflated lungs c/w COPD, also prior CT showed emphysema, so she has COPD.  -d-dimer 163 wnl, low suspicion for PE, no need for CTPA  -discharge home on albuterol inhaler, symbicort and spiriva , outpt f/u pulm.     Problem/Plan - 6:  ·  Problem: Hypertension.   ·  Plan: Pt with hx of hypertension  -meds: losartan 100  -hold in the setting of hypotension, adrenal insufficiency.    pulmonary emailed for appointment ,  Stable for discharge home today. follow up with endocrinologist in 1 week

## 2023-08-09 NOTE — PROGRESS NOTE ADULT - PROBLEM SELECTOR PLAN 1
sepsis ruled out, abx dc'd per ID  - pt confirmed to have shock d/t adrenal insufficiency, cortisol low 0.5 , also may have nephritis /colitis from immunotherapy as well,  taper steroid per endo, now tapered to hydrocortisone 50mg po q8h , transition to po hydrocortisone 20mg am and 10mg pm on DC  - need to r/o hypophysitis, checked pituitary hormone levels  ACTH low 1.8 c/w secondary adrenal insufficiency, other hormones okay (prolactin 25.7, TSH 0.86, FT4 low 0.6, LH 23.7, FSH 20.3, estradiol 33  - s/p fluid resuscitation, has peripheral edema and dyspnea, on 2L NC, lasix 40mg iv x1, repeat CXR  - wean O2 to RA as tolerated  - TTE (8/8) limited, mild MR, unable to evaluate LV/RV function  - daily labs BMP, Mg, phos  -Hold methimazole   -on admission CXR negative, RVP negative   -UCx normal bianca, UA not impressive; Bcx ngtd  -stool neg for C. diff, check GI PCR if diarrhea recurs  -left eye conjunctivitis, c/w tobramycin eye drop  - ID following

## 2023-08-10 DIAGNOSIS — R06.00 DYSPNEA, UNSPECIFIED: ICD-10-CM

## 2023-08-10 LAB
ALBUMIN SERPL ELPH-MCNC: 3.5 G/DL — SIGNIFICANT CHANGE UP (ref 3.3–5)
ALP SERPL-CCNC: 31 U/L — LOW (ref 40–120)
ALT FLD-CCNC: 10 U/L — SIGNIFICANT CHANGE UP (ref 4–33)
ANION GAP SERPL CALC-SCNC: 11 MMOL/L — SIGNIFICANT CHANGE UP (ref 7–14)
AST SERPL-CCNC: 18 U/L — SIGNIFICANT CHANGE UP (ref 4–32)
BILIRUB SERPL-MCNC: 0.2 MG/DL — SIGNIFICANT CHANGE UP (ref 0.2–1.2)
BUN SERPL-MCNC: 23 MG/DL — SIGNIFICANT CHANGE UP (ref 7–23)
CALCIUM SERPL-MCNC: 9.2 MG/DL — SIGNIFICANT CHANGE UP (ref 8.4–10.5)
CHLORIDE SERPL-SCNC: 103 MMOL/L — SIGNIFICANT CHANGE UP (ref 98–107)
CO2 SERPL-SCNC: 26 MMOL/L — SIGNIFICANT CHANGE UP (ref 22–31)
CREAT SERPL-MCNC: 1.41 MG/DL — HIGH (ref 0.5–1.3)
EGFR: 41 ML/MIN/1.73M2 — LOW
GLUCOSE SERPL-MCNC: 105 MG/DL — HIGH (ref 70–99)
HCT VFR BLD CALC: 31.8 % — LOW (ref 34.5–45)
HGB BLD-MCNC: 10.4 G/DL — LOW (ref 11.5–15.5)
MAGNESIUM SERPL-MCNC: 1.8 MG/DL — SIGNIFICANT CHANGE UP (ref 1.6–2.6)
MCHC RBC-ENTMCNC: 30.8 PG — SIGNIFICANT CHANGE UP (ref 27–34)
MCHC RBC-ENTMCNC: 32.7 GM/DL — SIGNIFICANT CHANGE UP (ref 32–36)
MCV RBC AUTO: 94.1 FL — SIGNIFICANT CHANGE UP (ref 80–100)
NRBC # BLD: 0 /100 WBCS — SIGNIFICANT CHANGE UP (ref 0–0)
NRBC # FLD: 0 K/UL — SIGNIFICANT CHANGE UP (ref 0–0)
PHOSPHATE SERPL-MCNC: 3.7 MG/DL — SIGNIFICANT CHANGE UP (ref 2.5–4.5)
PLATELET # BLD AUTO: 289 K/UL — SIGNIFICANT CHANGE UP (ref 150–400)
POTASSIUM SERPL-MCNC: 4.7 MMOL/L — SIGNIFICANT CHANGE UP (ref 3.5–5.3)
POTASSIUM SERPL-SCNC: 4.7 MMOL/L — SIGNIFICANT CHANGE UP (ref 3.5–5.3)
PROT SERPL-MCNC: 6.8 G/DL — SIGNIFICANT CHANGE UP (ref 6–8.3)
RBC # BLD: 3.38 M/UL — LOW (ref 3.8–5.2)
RBC # FLD: 13.1 % — SIGNIFICANT CHANGE UP (ref 10.3–14.5)
SODIUM SERPL-SCNC: 140 MMOL/L — SIGNIFICANT CHANGE UP (ref 135–145)
TSI ACT/NOR SER: <0.1 IU/L — SIGNIFICANT CHANGE UP (ref 0–0.55)
TSI ACT/NOR SER: <0.1 IU/L — SIGNIFICANT CHANGE UP (ref 0–0.55)
WBC # BLD: 11.51 K/UL — HIGH (ref 3.8–10.5)
WBC # FLD AUTO: 11.51 K/UL — HIGH (ref 3.8–10.5)

## 2023-08-10 PROCEDURE — 99232 SBSQ HOSP IP/OBS MODERATE 35: CPT

## 2023-08-10 RX ORDER — HYDROCORTISONE 20 MG
1 TABLET ORAL
Qty: 30 | Refills: 2
Start: 2023-08-10 | End: 2023-11-07

## 2023-08-10 RX ORDER — HYDROCORTISONE 20 MG
4 TABLET ORAL
Qty: 240 | Refills: 2
Start: 2023-08-10 | End: 2023-11-07

## 2023-08-10 RX ORDER — FUROSEMIDE 40 MG
20 TABLET ORAL ONCE
Refills: 0 | Status: COMPLETED | OUTPATIENT
Start: 2023-08-10 | End: 2023-08-10

## 2023-08-10 RX ORDER — BUDESONIDE AND FORMOTEROL FUMARATE DIHYDRATE 160; 4.5 UG/1; UG/1
2 AEROSOL RESPIRATORY (INHALATION)
Refills: 0 | Status: DISCONTINUED | OUTPATIENT
Start: 2023-08-10 | End: 2023-08-11

## 2023-08-10 RX ORDER — ALBUTEROL 90 UG/1
2 AEROSOL, METERED ORAL
Qty: 1 | Refills: 0
Start: 2023-08-10 | End: 2023-09-08

## 2023-08-10 RX ORDER — BUDESONIDE AND FORMOTEROL FUMARATE DIHYDRATE 160; 4.5 UG/1; UG/1
2 AEROSOL RESPIRATORY (INHALATION)
Qty: 1 | Refills: 0
Start: 2023-08-10 | End: 2023-09-08

## 2023-08-10 RX ORDER — HYDROCORTISONE 20 MG
1 TABLET ORAL
Qty: 2 | Refills: 0
Start: 2023-08-10 | End: 2023-08-10

## 2023-08-10 RX ORDER — HYDROCORTISONE 20 MG
25 TABLET ORAL
Refills: 0 | Status: COMPLETED | OUTPATIENT
Start: 2023-08-11 | End: 2023-08-11

## 2023-08-10 RX ORDER — ALBUTEROL 90 UG/1
2 AEROSOL, METERED ORAL EVERY 6 HOURS
Refills: 0 | Status: DISCONTINUED | OUTPATIENT
Start: 2023-08-10 | End: 2023-08-12

## 2023-08-10 RX ORDER — HYDROCORTISONE 20 MG
25 TABLET ORAL ONCE
Refills: 0 | Status: COMPLETED | OUTPATIENT
Start: 2023-08-10 | End: 2023-08-10

## 2023-08-10 RX ADMIN — Medication 50 MILLIGRAM(S): at 07:00

## 2023-08-10 RX ADMIN — Medication 81 MILLIGRAM(S): at 11:56

## 2023-08-10 RX ADMIN — Medication 20 MILLIGRAM(S): at 11:56

## 2023-08-10 RX ADMIN — Medication 1 APPLICATION(S): at 00:42

## 2023-08-10 RX ADMIN — Medication 3 MILLILITER(S): at 11:07

## 2023-08-10 RX ADMIN — Medication 25 MILLIGRAM(S): at 12:20

## 2023-08-10 RX ADMIN — Medication 1 APPLICATION(S): at 06:24

## 2023-08-10 RX ADMIN — Medication 1 APPLICATION(S): at 18:39

## 2023-08-10 RX ADMIN — ENOXAPARIN SODIUM 40 MILLIGRAM(S): 100 INJECTION SUBCUTANEOUS at 11:56

## 2023-08-10 RX ADMIN — Medication 1 APPLICATION(S): at 12:11

## 2023-08-10 RX ADMIN — Medication 3 MILLILITER(S): at 04:17

## 2023-08-10 NOTE — PROGRESS NOTE ADULT - ASSESSMENT
Patient is a 67y F PMHx HTN, small cell lung cancer (s/p chemo and immunotherapy, now on maintenance Atezolizumab as of 2022) , presenting with lightheadedness for 2 days and intermittent diarrhea. Pt hypotensive at urgent care with BP 81/54 in ED, minimal improvement following multiple fluid boluses, with serum cortisol 0.5, consistent with adrenal insufficiency. Patient on hydrocortisone 50 mg q8 with positive response, BP [id number] systolic, as well as symptomatic improvement.    #Adrenal Insufficiency - suspect secondary AI vs. hypophysitis (however pt does not have headaches which is typical of the latter  #Hypotension - adrenal crisis, now improved with steroids  - Pt with serum cortisol 0.5, diagnostic of adrenal insufficiency, which is a known complication of checkpoint inhibitor immunotherapy. There is not need for cosyntropin testing at this point.  - ACTH <2 on  (however on steroids), will treat as presumably secondary adrenal insufficiency (central issue).   - Patient exhibiting positive response to hydrocortisone. Please taper hydrocortisone to 50mg BID PO today . Monitor BP and symptoms for improvement.   - Low LH/FSH, consistent with central picture as pt is menopausal.  - Free T4 0.6  (has been <1 since admission)   - can consider pituitary MRI, however patient has issues w/ claustrophobia as per primary team -> can plan to address as outpatient and treat hormonally as above especially since she does not have typical headache presentation with hypophysitis   - Discharge plannin mg BID x 2 days --> 20 mg BID x 2 days --> 20 mg AM, 10 mg PM indefinitely. Patient will need to repeat free T4 within 1-2 weeks of discharge with outpatient endocrinologist, Dr. Santos Miller     To help with discharge planning, Please print and give the pt info section for patients under adrenal insufficiency (this will educate the patient regarding the warning signs of adrenal crisis )   -  patient that they should take 2-3x her home dose in cases of illness, fever, accidents, and surgery  - pt should receive stress dosing (hydrocortisone 50mg q8) with any major illness or surgical procedure  - Should pt be unable to tolerate PO and is unable to take hydrocortisone, they will need an emergency injection. Please discharge with a prescription for 100 mg Solu-Cortef Act-O-Vial and the following instructions:  [email or web address]/[email or web address]-[email or web address]-[email or web address]-[email or web address]-[email or web address]-cortef-act-[email or web address]-vial-[email or web address]-chamber-[email or web address]  - pt should obtain a medical alert bracelet or necklace to inform emergency providers that they have adrenal insufficiency  [email or web address]  - Patient should also monitor BP closely at home    #Hyperthyroidism   - Patient unsure as to cause of hyperthyroidism, denies any active symptoms. Enlarged thyroid on exam.   - TSH NORMAL 3.07, free thyroxine LOW at 0.8  (<1 since admission)  - abnormal TFTs could be due to overtreatment of hyperthyroidism (although would suspect TSH to be high), nonthyroidal illness,  vs hypophysitis i/s/o checkpoint inhibitor therapy with low FT4 from central process  - On methimazole 5mg at home; held since patient admitted  - Continue to methimazole upon discharge   - Please order TSH receptor Ab, Thyroid stimulating immunoglobulin Ab Patient is a 67y F PMHx HTN, small cell lung cancer (s/p chemo and immunotherapy, now on maintenance Atezolizumab as of 2022) , presenting with lightheadedness for 2 days and intermittent diarrhea. Pt hypotensive at urgent care with BP 81/54 in ED, minimal improvement following multiple fluid boluses, with serum cortisol 0.5, consistent with adrenal insufficiency. Patient with positive response and symptomatic improvement on hydrocortisone.     #Adrenal Insufficiency - suspect secondary AI vs. hypophysitis (however pt does not have headaches which is typical of the latter  #Hypotension - adrenal crisis, now improved with steroids  - Pt with serum cortisol 0.5, diagnostic of adrenal insufficiency, which is a known complication of checkpoint inhibitor immunotherapy. There is not need for cosyntropin testing at this point.  - Monitor BP and symptoms for improvement.   - Pituitary labs inconsistent with central process, although ACTH low (<2)  - Free T4 0.6  (has been <1 since admission)   - can consider pituitary MRI, however patient has issues w/ claustrophobia as per primary team -> can plan to address as outpatient and treat hormonally as above especially since she does not have typical headache presentation with hypophysitis   - Discharge plannin mg BID x 2 days (Day 1: 8/10) --> 20 mg BID x 2 days --> 20 mg AM, 10 mg PM indefinitely. Patient will need to repeat free T4 within 1-2 weeks of discharge with outpatient endocrinologist, Dr. Santos Miller  - Repeat prolactin and macroprolactin in 1-2 weeks outpatient; prolactin 25.7 on     To help with discharge planning, Please print and give the pt info section for patients under adrenal insufficiency (this will educate the patient regarding the warning signs of adrenal crisis )   -  patient that they should take 2-3x her home dose in cases of illness, fever, accidents, and surgery  - pt should receive stress dosing (hydrocortisone 50mg q8) with any major illness or surgical procedure  - Should pt be unable to tolerate PO and is unable to take hydrocortisone, they will need an emergency injection. Please discharge with a prescription for 100 mg Solu-Cortef Act-O-Vial and the following instructions:  [email or web address]/emergency-injection/[email or web address]-injection-[email or web address]-solu-cortef-act-o-vial-[email or web address]-chamber-[email or web address]  - pt should obtain a medical alert bracelet or necklace to inform emergency providers that they have adrenal insufficiency  [email or web address]  - Patient should also monitor BP closely at home    #Hyperthyroidism   - Patient unsure as to cause of hyperthyroidism, denies any active symptoms. Enlarged thyroid on exam.   - TSH NORMAL 3.07, free thyroxine LOW at 0.8  (<1 since admission)  - abnormal TFTs could be due to overtreatment of hyperthyroidism (although would suspect TSH to be high), nonthyroidal illness,  vs hypophysitis i/s/o checkpoint inhibitor therapy with low FT4 from central process  - On methimazole 5mg at home; held since patient admitted  - Continue to hold methimazole upon discharge   - Please order TSH receptor Ab, Thyroid stimulating immunoglobulin Ab Patient is a 67y F PMHx HTN, small cell lung cancer (s/p chemo and immunotherapy, now on maintenance Atezolizumab as of 2022) , presenting with lightheadedness for 2 days and intermittent diarrhea. Pt hypotensive at urgent care with BP 81/54 in ED, minimal improvement following multiple fluid boluses, with serum cortisol 0.5, consistent with adrenal insufficiency. Patient with positive response and symptomatic improvement on hydrocortisone.     #Adrenal Insufficiency - suspect secondary AI vs. hypophysitis (however pt does not have headaches which is typical of the latter  #Hypotension - adrenal crisis, now improved with steroids  - Pt with serum cortisol 0.5, diagnostic of adrenal insufficiency, which is a known complication of checkpoint inhibitor immunotherapy. There is not need for cosyntropin testing at this point.  - Monitor BP and symptoms for improvement.   - Pituitary labs inconsistent with central process, although ACTH low (<2)  - Free T4 0.6  (has been <1 since admission)   - can consider pituitary MRI, however patient has issues w/ claustrophobia as per primary team -> can plan to address as outpatient and treat hormonally as above especially since she does not have typical headache presentation with hypophysitis   - Discharge plannin mg BID x 2 days (Day 1: 8/10) --> 20 mg BID x 2 days --> 20 mg AM, 10 mg PM until Endocrine follow up. Patient will need to repeat free T4 within 1-2 weeks of discharge with outpatient endocrinologist, Dr. Santos Miller  - Repeat prolactin and macroprolactin in 1-2 weeks outpatient; prolactin 25.7 on , asymptomatic    To help with discharge planning, Please print and give the pt info section for patients under adrenal insufficiency (this will educate the patient regarding the warning signs of adrenal crisis )   -  patient that they should take 2-3x her home dose in cases of illness, fever, accidents, and surgery  - pt should receive stress dosing (hydrocortisone 50mg q8) with any major illness or surgical procedure  - Should pt be unable to tolerate PO and is unable to take hydrocortisone, they will need an emergency injection. Please discharge with a prescription for 100 mg Solu-Cortef Act-O-Vial and the following instructions:  [email or web address]/emergency-injection/emergency-injection-cortico-[email or web address]-solu-cortef-act-o-vial-[email or web address]-chamber-ampul/  - pt should obtain a medical alert bracelet or necklace to inform emergency providers that they have adrenal insufficiency  [email or web address]  - Patient should also monitor BP closely at home    #Hyperthyroidism   - Patient unsure as to cause of hyperthyroidism, denies any active symptoms. Enlarged thyroid on exam.   - TSH NORMAL 3.07, free thyroxine LOW at 0.8  (<1 since admission)  - abnormal TFTs could be due to overtreatment of hyperthyroidism (although would suspect TSH to be high), nonthyroidal illness,  vs hypophysitis i/s/o checkpoint inhibitor therapy with low FT4 from central process  - On methimazole 5mg at home; held since patient admitted  - Continue to hold methimazole upon discharge   - Please f/u TSH receptor Ab. Thyroid stimulating immunoglobulin Ab was negative

## 2023-08-10 NOTE — PROGRESS NOTE ADULT - PROBLEM SELECTOR PLAN 1
sepsis ruled out, abx dc'd per ID  - pt confirmed to have shock d/t adrenal insufficiency, cortisol low 0.5 , also may have nephritis /colitis from immunotherapy as well,  taper steroid per endo, hydrocortisone 25 mg bid today and tomorrow, then 20 mg bid x 2 days, then 20 mg 8 am and 10mg pm indefinitely, outpt f/u with her endocrinologist Dr. Santos Miller --messaged on [employer].   - need to r/o hypophysitis, checked pituitary hormone levels  ACTH low 1.8 c/w secondary adrenal insufficiency, other hormones okay (prolactin 25.7, TSH 0.86, FT4 low 0.6, LH 23.7, FSH 20.3, estradiol 33  - s/p fluid resuscitation, has peripheral edema and dyspnea, on 2L NC,  lasix 40 yesterday and 20mg today, CXR 8/9 clear lungs  - wean O2 to RA as tolerated  - TTE (8/8) limited, mild MR, unable to evaluate LV/RV function  - daily labs BMP, Mg, phos  -Hold methimazole   -UCx normal bianca, UA not impressive; Bcx ngtd  -stool neg for C. diff, check GI PCR if diarrhea recurs  -left eye conjunctivitis, c/w tobramycin eye drop, ID signed off  - dispo: DC home today, updated pt and son on 8/10  Time spent on discharge 32 minutes coordinating discharge plan and discussing with patient and family.

## 2023-08-10 NOTE — PROGRESS NOTE ADULT - SUBJECTIVE AND OBJECTIVE BOX
HPI:  Patient is a 67y F PMHx HTN, Lung CA (on chemo, last 7/22/23), p/w lightheadedness for 2 days. Patient reports that she has been having intermittent episodes of diarrhea since 8/3. Her son, whom she lives with, initially experienced diarrhea for approximately a week prior. Pt denies recent travel hx or other events. Pt has also had decreased PO intake since then as well. She presented to urgent care because she felt lightheadeded and was noted to be hypotensive, was instructed to come to hospital. Pt endorses a chronic dry cough. She denies fevers, chest pain, dyspnea, nausea, vomiting, melena/hematochezia, abdominal pain and dysuria. (07 Aug 2023 02:21)    Reason for consult: Adrenal insufficiency, Hx of hyperthyroidism    Patient was diagnosed with small cell lung cancer in July 2022. She has been treated with maintenance Atezolizumab since 11/2022 (last dose per pt was in 6/2023) and denies experiencing adverse effects until now. Pt endorses throwing up once yesterday evening 8/6 as well as multiple days of intermittent diarrhea and fatigue. She denies Hx of adrenal issues or known pituitary tumor in the past as well as family history of these conditions. Her BP is generally around 110/70 at home and she denies previous episodes of hypotension. She does not usually take steroids. She does not have headaches.     Regarding her hyperthyroidism, the patient states she has been following with a [facility] endocrinologist and takes methimazole 5mg, which was started several years ago. She does not recall having a history of Graves or nodules. She is not sure about thyroid antibodies. She denies other autoimmune conditions in her self. Notes that daughter has hypothyroidism. She endorses weight loss over the last few years which her and daughter acknowledge may also be due to her cancer. She denies other symptoms of hyperthyroidism such as feeling hot/cold and palpitations.     Of note, pt reports she can only get standing/open MRI due to claustrophobia.  ---  Overnight events/interval history: Patient feels well, energetic. No n/v/d, no abdominal pain.  Hopeful for d/c.       PAST MEDICAL & SURGICAL HISTORY:  HTN (hypertension)    Hyperthyroidism    Lung cancer    History of cholecystectomy  1989    FAMILY HISTORY:  FH: HTN (hypertension) (Mother)    Outpatient Medications:    MEDICATIONS  (STANDING):  aspirin enteric coated 81 milliGRAM(s) Oral daily  enoxaparin Injectable 40 milliGRAM(s) SubCutaneous every 24 hours  hydrocortisone sodium succinate Injectable 50 milliGRAM(s) IV Push every 6 hours  lactated ringers. 1000 milliLiter(s) (75 mL/Hr) IV Continuous <Continuous>  piperacillin/tazobactam IVPB.. 3.375 Gram(s) IV Intermittent every 8 hours  tobramycin 0.3% Ointment 1 Application(s) Left EYE every 6 hours    MEDICATIONS  (PRN):    Allergies    Tylenol (Stomach Upset)    Intolerances    Review of Systems:  Constitutional: No fever  Eyes: No blurry vision  Neuro: No tremors  HEENT: No pain  Cardiovascular: No chest pain, palpitations  Respiratory: No SOB, no cough  GI: No vomiting, diarrhea. +Constipation  : No dysuria  Skin: no rash  Psych: no depression  Endocrine: no polyuria, polydipsia  Hem/lymph: +Weight loss. no swelling  Osteoporosis: no fractures    ALL OTHER SYSTEMS REVIEWED AND NEGATIVE    PHYSICAL EXAM:    Vital Signs Last 24 Hrs  T(C): 36.5 (09 Aug 2023 06:51), Max: 36.8 (08 Aug 2023 17:02)  T(F): 97.7 (09 Aug 2023 06:51), Max: 98.2 (08 Aug 2023 17:02)  HR: 93 (09 Aug 2023 06:51) (86 - 93)  BP: 133/76 (09 Aug 2023 06:51) (125/79 - 135/74)  RR: 18 (09 Aug 2023 06:51) (17 - 18)  SpO2: 97% (09 Aug 2023 06:51) (97% - 100%)    Parameters below as of 09 Aug 2023 06:51  Patient On (Oxygen Delivery Method): nasal cannula  O2 Flow (L/min): 2    GENERAL: NAD, well-groomed, well-developed  EYES: No proptosis, no lid lag, anicteric  HEENT:  Atraumatic, Normocephalic, moist mucous membranes  THYROID: Enlarged, no palpable nodules  RESPIRATORY: Clear to auscultation bilaterally; No rales, rhonchi, wheezing  CARDIOVASCULAR: Regular rate and rhythm; No murmurs; no peripheral edema  GI: Soft, nontender, non distended, normal bowel sounds  SKIN: Dry, intact, No rashes or lesions  MUSCULOSKELETAL: Full range of motion, normal strength  NEURO: sensation intact, extraocular movements intact, no tremor  PSYCH: Alert and oriented x 3, normal affect, normal mood  CUSHING'S SIGNS: no striae      LABS:                             10.7   15.08 )-----------( 279      ( 09 Aug 2023 06:56 )             31.0     08-09    138  |  104  |  19  ----------------------------<  117<H>  4.5   |  24  |  1.47<H>    Ca    9.2      09 Aug 2023 06:56  Phos  3.2     08-09  Mg     1.90     08-09    TPro  6.5  /  Alb  3.5  /  TBili  0.2  /  DBili  x   /  AST  19  /  ALT  9   /  AlkPhos  30<L>  08-09      Thyroid Function Tests: Free T4, Serum: 0.6 (08-09); TSH, Serum: 0.86 (08-08)    Leuteinizing Hormone, Serum: 23.7 (08-08); Follicle Stimulating Hormone, Serum: 20.3 (-8-08); Estradiol, Serum: 33 (08-08); Prolactin, Serum: 25.7 (08-08). ACTH, Serum: 1.8 (08-08); IGF-1: 58 (08-08)    Cortisol AM, Serum: 162.9 (08-08)               HPI:  Patient is a 67y F PMHx HTN, Lung CA (on chemo, last 7/22/23), p/w lightheadedness for 2 days. Patient reports that she has been having intermittent episodes of diarrhea since 8/3. Her son, whom she lives with, initially experienced diarrhea for approximately a week prior. Pt denies recent travel hx or other events. Pt has also had decreased PO intake since then as well. She presented to urgent care because she felt lightheadeded and was noted to be hypotensive, was instructed to come to hospital. Pt endorses a chronic dry cough. She denies fevers, chest pain, dyspnea, nausea, vomiting, melena/hematochezia, abdominal pain and dysuria. (07 Aug 2023 02:21)    Reason for consult: Adrenal insufficiency, Hx of hyperthyroidism    Patient was diagnosed with small cell lung cancer in July 2022. She has been treated with maintenance Atezolizumab since 11/2022 (last dose per pt was in 6/2023) and denies experiencing adverse effects until now. Pt endorses throwing up once yesterday evening 8/6 as well as multiple days of intermittent diarrhea and fatigue. She denies Hx of adrenal issues or known pituitary tumor in the past as well as family history of these conditions. Her BP is generally around 110/70 at home and she denies previous episodes of hypotension. She does not usually take steroids. She does not have headaches.     Regarding her hyperthyroidism, the patient states she has been following with a [facility] endocrinologist and takes methimazole 5mg, which was started several years ago. She does not recall having a history of Graves or nodules. She is not sure about thyroid antibodies. She denies other autoimmune conditions in her self. Notes that daughter has hypothyroidism. She endorses weight loss over the last few years which her and daughter acknowledge may also be due to her cancer. She denies other symptoms of hyperthyroidism such as feeling hot/cold and palpitations.     Of note, pt reports she can only get standing/open MRI due to claustrophobia.  ---  Overnight events/interval history: Patient with worsening SOB, on Duoneb during bedside exam. No lethargy, fatigue, n/v/d, no abdominal pain. Likely d/c this afternoon.      PAST MEDICAL & SURGICAL HISTORY:  HTN (hypertension)    Hyperthyroidism    Lung cancer    History of cholecystectomy  1989    FAMILY HISTORY:  FH: HTN (hypertension) (Mother)    Outpatient Medications:    MEDICATIONS  (STANDING):  aspirin enteric coated 81 milliGRAM(s) Oral daily  enoxaparin Injectable 40 milliGRAM(s) SubCutaneous every 24 hours  hydrocortisone sodium succinate Injectable 50 milliGRAM(s) IV Push every 6 hours  lactated ringers. 1000 milliLiter(s) (75 mL/Hr) IV Continuous <Continuous>  piperacillin/tazobactam IVPB.. 3.375 Gram(s) IV Intermittent every 8 hours  tobramycin 0.3% Ointment 1 Application(s) Left EYE every 6 hours    MEDICATIONS  (PRN):    Allergies    Tylenol (Stomach Upset)    Intolerances    Review of Systems:  Constitutional: No fever  Eyes: No blurry vision  Neuro: No tremors  HEENT: No pain  Cardiovascular: No chest pain, palpitations  Respiratory: + SOB, no cough  GI: No vomiting, diarrhea. +Constipation  : No dysuria  Skin: no rash  Psych: no depression  Endocrine: no polyuria, polydipsia  Hem/lymph: +Weight loss. no swelling  Osteoporosis: no fractures    ALL OTHER SYSTEMS REVIEWED AND NEGATIVE    PHYSICAL EXAM:    Vital Signs Last 24 Hrs  T(C): 36.5 (09 Aug 2023 06:51), Max: 36.8 (08 Aug 2023 17:02)  T(F): 97.7 (09 Aug 2023 06:51), Max: 98.2 (08 Aug 2023 17:02)  HR: 93 (09 Aug 2023 06:51) (86 - 93)  BP: 133/76 (09 Aug 2023 06:51) (125/79 - 135/74)  RR: 18 (09 Aug 2023 06:51) (17 - 18)  SpO2: 97% (09 Aug 2023 06:51) (97% - 100%)    Parameters below as of 09 Aug 2023 06:51  Patient On (Oxygen Delivery Method): nasal cannula  O2 Flow (L/min): 2    GENERAL: NAD, well-groomed, well-developed  EYES: No proptosis, no lid lag, anicteric  HEENT:  Atraumatic, Normocephalic, moist mucous membranes  THYROID: Enlarged, no palpable nodules  RESPIRATORY: Clear to auscultation bilaterally; No rales, rhonchi, wheezing  CARDIOVASCULAR: Regular rate and rhythm; No murmurs; no peripheral edema  GI: Soft, nontender, non distended, normal bowel sounds  SKIN: Dry, intact, No rashes or lesions  MUSCULOSKELETAL: Full range of motion, normal strength  NEURO: sensation intact, extraocular movements intact, no tremor  PSYCH: Alert and oriented x 3, normal affect, normal mood  CUSHING'S SIGNS: no striae      LABS:                             10.4   11.51 )-----------( 289      ( 10 Aug 2023 05:10 )             31.8     08-10    140  |  103  |  23  ----------------------------<  105<H>  4.7   |  26  |  1.41<H>    Ca    9.2      10 Aug 2023 05:10  Phos  3.7     08-10  Mg     1.80     08-10    TPro  6.8  /  Alb  3.5  /  TBili  0.2  /  DBili  x   /  AST  18  /  ALT  10  /  AlkPhos  31<L>  08-10      Thyroid Function Tests: Free T4, Serum: 0.6 (08-09); TSH, Serum: 0.86 (08-08)    Leuteinizing Hormone, Serum: 23.7 (08-08); Follicle Stimulating Hormone, Serum: 20.3 (-8-08); Estradiol, Serum: 33 (08-08); Prolactin, Serum: 25.7 (08-08). ACTH, Serum: 1.8 (08-08); IGF-1: 58 (08-08)    Cortisol AM, Serum: 162.9 (08-08)

## 2023-08-10 NOTE — PROGRESS NOTE ADULT - ATTENDING COMMENTS
67y F PMHx HTN, small cell lung cancer (on Atezolizumab - last dose 6/2023) presenting with weakness and hypotension, found to have very low AM cortisol to <1. Started on stress steroids with hydrocortisone with improvement. Can taper PO hydrocortisone as above. No need for cosyntropin testing at this level. Pt will need to go home on PO hydrocortisone (likely 20 mg in AM and 10 mg in PM). Check AM pituitary panel to assess further, as well as graves antibodies to help determine further workup. If Ft4 is low from central process may end up needing levothyroxine (will need to consider this if graves antibodies are negative, and would hold methimazole, after adequate hydrocortisone replacement). Right now picture is confounded given immunotherapy so will need to clarify what is going on. She needs endocrine follow up
Jane 67-year-old woman with history of hypertension, lung cancer on chemotherapy, also recently started on immunotherapy with Atezolizumab as of November 2022, presenting with lightheadedness, found to be hypotensive, low cortisol level 0.5, consistent with adrenal insufficiency, likely secondary to immunotherapy.  Also noted to have hyperthyroidism as an outpatient and was treated with methimazole 5 mg once daily.  Patient is currently on hydrocortisone 50 mg every 8 hours.  Patient is clinically stable for discharge home.  Recommend to give another dose of hydrocortisone 50 mg this afternoon.  Starting tomorrow reduce hydrocortisone to 25 mg twice daily for 2 days, followed by 20 mg twice daily for 2 days, followed by maintenance dose of 20 mg in the morning, 10 mg in the afternoon.  Patient was instructed to obtain medical bracelets and write down the diagnosis of adrenal insufficiency on there.  Patient should be discharged home with Solu-Cortef in case of severe adrenal insufficiency.  Discussed with primary team's physician assistant.  Patient has an outpatient endocrinologist Dr. Santos Miller, patient reports that she has an appointment to see him in August 2023.  Regarding hypothyroidism, noted to have normal TSH level and low free T4 level 0.8.  Recommend holding methimazole for now.  Recommend repeating TFT within 1 to 2 weeks of discharge.  Should follow-up with TSH receptor antibody and thyroid-stimulating immunoglobulin antibody as an outpatient.  At that time can determine restarting methimazole versus continue to hold given suppressed free T4 level.
67-year-old woman with history of lung cancer on chemotherapy with Atezolizumab exposure presenting with lightheadedness, found to be hypotensive with low cortisol level 0.5, consistent with adrenal insufficiency, likely secondary to immunotherapy.  Also noted to have hyperthyroidism as an outpatient and was treated with methimazole 5 mg once daily. Continue hydrocortisone per taper listed above.  Patient should be discharged home with Solu-Cortef in case of severe adrenal insufficiency.  Patient has an outpatient endocrinologist Dr. Santos Miller, patient reports that she has an appointment to see him in August 15, 2023.  Regarding hypothyroidism, noted to have normal TSH level and low free T4 level 0.8.  Recommend continuing to hold methimazole for now.  Recommend repeating TFT within 1 to 2 weeks of discharge.  Should follow-up with TSH receptor antibody  as an outpatient.  She will also need repeat PRL with macroprolactin.  FSH/LH c/w menopause state, does not appear to have pituitary labs c/w hypophysitis, just isolated ACTH deficiency/central adrenal insufficiency.  Needs outpt DXA - reports menopause since age 42.

## 2023-08-10 NOTE — PROGRESS NOTE ADULT - PROBLEM SELECTOR PLAN 4
Hold methimazole  FT4 low 0.7, TSH 3  - Recheck FT4 low 0.6, , TSH 0.86  - f/u TSH receptor Ab, TSI Ab  - DC methimazole, outpt f/u endo

## 2023-08-10 NOTE — PROGRESS NOTE ADULT - PROBLEM SELECTOR PLAN 5
pt reports exertional dyspnea, desat to 86% after ambulation on RA, home O2 arranged by CM, hx of lung ca and former smoker 1/2 ppd x30 yrs  breathing improved w/ duoneb  will dc home on symbicort and albuterol inhaler pt reports exertional dyspnea, desat to 86% after ambulation on RA, home O2 arranged by CM, hx of lung ca and former smoker 1/2 ppd x30 yrs  pt and her son don't feel comfortable being discharged yesterday due to SOB  breathing improved w/ duoneb  will dc home on symbicort and albuterol inhaler

## 2023-08-10 NOTE — PROGRESS NOTE ADULT - SUBJECTIVE AND OBJECTIVE BOX
Dr. Katya Novak  Pager [phone]    PROGRESS NOTE:     Patient is a 67y old  Female who presents with a chief complaint of adrenal insufficiency (09 Aug 2023 11:12)      SUBJECTIVE / OVERNIGHT EVENTS: denies chest pain, breathing better with nebs   ADDITIONAL REVIEW OF SYSTEMS: afebrile, satting well on 2L, oxygen delivered     MEDICATIONS  (STANDING):  aspirin enteric coated 81 milliGRAM(s) Oral daily  budesonide  80 MICROgram(s)/formoterol 4.5 MICROgram(s) Inhaler 2 Puff(s) Inhalation two times a day  enoxaparin Injectable 40 milliGRAM(s) SubCutaneous every 24 hours  hydrocortisone 25 milliGRAM(s) Oral once  lactated ringers. 1000 milliLiter(s) (75 mL/Hr) IV Continuous <Continuous>  tobramycin 0.3% Ointment 1 Application(s) Left EYE every 6 hours    MEDICATIONS  (PRN):  albuterol    90 MICROgram(s) HFA Inhaler 2 Puff(s) Inhalation every 6 hours PRN Shortness of Breath      CAPILLARY BLOOD GLUCOSE        I&O's Summary    09 Aug 2023 07:01  -  10 Aug 2023 07:00  --------------------------------------------------------  IN: 440 mL / OUT: 600 mL / NET: -160 mL        PHYSICAL EXAM:  Vital Signs Last 24 Hrs  T(C): 36.7 (10 Aug 2023 07:00), Max: 36.8 (09 Aug 2023 21:54)  T(F): 98.1 (10 Aug 2023 07:00), Max: 98.2 (09 Aug 2023 21:54)  HR: 77 (10 Aug 2023 07:00) (77 - 98)  BP: 129/84 (10 Aug 2023 07:00) (129/78 - 132/72)  BP(mean): --  RR: 18 (10 Aug 2023 07:00) (18 - 18)  SpO2: 100% (10 Aug 2023 07:00) (98% - 100%)    Parameters below as of 10 Aug 2023 07:00  Patient On (Oxygen Delivery Method): nasal cannula, 2        CONSTITUTIONAL: NAD, well-developed  RESPIRATORY: Normal respiratory effort; lungs are clear to auscultation bilaterally  CARDIOVASCULAR: Regular rate and rhythm, normal S1 and S2, no murmur/rub/gallop; 1+ lower extremity edema; Peripheral pulses are 2+ bilaterally  ABDOMEN: Nontender to palpation, normoactive bowel sounds, no rebound/guarding; No hepatosplenomegaly  MUSCULOSKELETAL: no clubbing or cyanosis of digits; no joint swelling or tenderness to palpation  PSYCH: A+O to person, place, and time; affect appropriate      LABS:                        10.4   11.51 )-----------( 289      ( 10 Aug 2023 05:10 )             31.8     08-10    140  |  103  |  23  ----------------------------<  105<H>  4.7   |  26  |  1.41<H>    Ca    9.2      10 Aug 2023 05:10  Phos  3.7     08-10  Mg     1.80     08-10    TPro  6.8  /  Alb  3.5  /  TBili  0.2  /  DBili  x   /  AST  18  /  ALT  10  /  AlkPhos  31<L>  08-10          Urinalysis Basic - ( 10 Aug 2023 05:10 )    Color: x / Appearance: x / SG: x / pH: x  Gluc: 105 mg/dL / Ketone: x  / Bili: x / Urobili: x   Blood: x / Protein: x / Nitrite: x   Leuk Esterase: x / RBC: x / WBC x   Sq Epi: x / Non Sq Epi: x / Bacteria: x          RADIOLOGY & ADDITIONAL TESTS:  Results Reviewed:   Imaging Personally Reviewed:  Electrocardiogram Personally Reviewed:    COORDINATION OF CARE:  Care Discussed with Consultants/Other Providers [Y/N]:  Prior or Outpatient Records Reviewed [Y/N]:

## 2023-08-10 NOTE — CHART NOTE - NSCHARTNOTEFT_GEN_A_CORE
Patient declined to be discharged home today as she feels she's still short of breath when she walked to the bathroom. She and her son was also uncomfortable with discharge home yesterday for the same reason.   check ambulating O2 sat  incentive spirometer  lungs remains clear, no wheezing, non calf pain.  no chest pain, no fever, CXR neg x2 clear lungs, low suspicion for PE.   Plan to discharge home tomorrow, d/w CM Patient declined to be discharged home today as she feels she's still short of breath when she walked to the bathroom. She and her son was also uncomfortable with discharge home yesterday for the same reason.   check ambulating O2 sat  incentive spirometer  pt reports duoneb helped her last night, though she denies hx of COPD and does not have wheezing on exam, swiotched to symbicort and albuterol inhaler  lungs remains clear, no wheezing, non calf pain.  no chest pain, no fever, CXR neg x2 clear lungs, low suspicion for PE.   Plan to discharge home tomorrow, d/w CM

## 2023-08-10 NOTE — PROGRESS NOTE ADULT - PROBLEM SELECTOR PLAN 2
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

## 2023-08-11 ENCOUNTER — TRANSCRIPTION ENCOUNTER (OUTPATIENT)
Age: 68
End: 2023-08-11

## 2023-08-11 DIAGNOSIS — R06.00 DYSPNEA, UNSPECIFIED: ICD-10-CM

## 2023-08-11 LAB
ANION GAP SERPL CALC-SCNC: 8 MMOL/L — SIGNIFICANT CHANGE UP (ref 7–14)
B PERT DNA SPEC QL NAA+PROBE: SIGNIFICANT CHANGE UP
B PERT+PARAPERT DNA PNL SPEC NAA+PROBE: SIGNIFICANT CHANGE UP
BORDETELLA PARAPERTUSSIS (RAPRVP): SIGNIFICANT CHANGE UP
BUN SERPL-MCNC: 23 MG/DL — SIGNIFICANT CHANGE UP (ref 7–23)
C PNEUM DNA SPEC QL NAA+PROBE: SIGNIFICANT CHANGE UP
CALCIUM SERPL-MCNC: 8.9 MG/DL — SIGNIFICANT CHANGE UP (ref 8.4–10.5)
CHLORIDE SERPL-SCNC: 102 MMOL/L — SIGNIFICANT CHANGE UP (ref 98–107)
CO2 SERPL-SCNC: 33 MMOL/L — HIGH (ref 22–31)
CREAT SERPL-MCNC: 1.27 MG/DL — SIGNIFICANT CHANGE UP (ref 0.5–1.3)
CULTURE RESULTS: SIGNIFICANT CHANGE UP
CULTURE RESULTS: SIGNIFICANT CHANGE UP
EGFR: 47 ML/MIN/1.73M2 — LOW
FLUAV SUBTYP SPEC NAA+PROBE: SIGNIFICANT CHANGE UP
FLUBV RNA SPEC QL NAA+PROBE: SIGNIFICANT CHANGE UP
GLUCOSE SERPL-MCNC: 80 MG/DL — SIGNIFICANT CHANGE UP (ref 70–99)
HADV DNA SPEC QL NAA+PROBE: SIGNIFICANT CHANGE UP
HCOV 229E RNA SPEC QL NAA+PROBE: SIGNIFICANT CHANGE UP
HCOV HKU1 RNA SPEC QL NAA+PROBE: SIGNIFICANT CHANGE UP
HCOV NL63 RNA SPEC QL NAA+PROBE: SIGNIFICANT CHANGE UP
HCOV OC43 RNA SPEC QL NAA+PROBE: SIGNIFICANT CHANGE UP
HCT VFR BLD CALC: 34.6 % — SIGNIFICANT CHANGE UP (ref 34.5–45)
HGB BLD-MCNC: 11.4 G/DL — LOW (ref 11.5–15.5)
HMPV RNA SPEC QL NAA+PROBE: SIGNIFICANT CHANGE UP
HPIV1 RNA SPEC QL NAA+PROBE: SIGNIFICANT CHANGE UP
HPIV2 RNA SPEC QL NAA+PROBE: SIGNIFICANT CHANGE UP
HPIV3 RNA SPEC QL NAA+PROBE: SIGNIFICANT CHANGE UP
HPIV4 RNA SPEC QL NAA+PROBE: SIGNIFICANT CHANGE UP
M PNEUMO DNA SPEC QL NAA+PROBE: SIGNIFICANT CHANGE UP
MAGNESIUM SERPL-MCNC: 1.6 MG/DL — SIGNIFICANT CHANGE UP (ref 1.6–2.6)
MCHC RBC-ENTMCNC: 30.7 PG — SIGNIFICANT CHANGE UP (ref 27–34)
MCHC RBC-ENTMCNC: 32.9 GM/DL — SIGNIFICANT CHANGE UP (ref 32–36)
MCV RBC AUTO: 93.3 FL — SIGNIFICANT CHANGE UP (ref 80–100)
NRBC # BLD: 0 /100 WBCS — SIGNIFICANT CHANGE UP (ref 0–0)
NRBC # FLD: 0 K/UL — SIGNIFICANT CHANGE UP (ref 0–0)
PHOSPHATE SERPL-MCNC: 2.9 MG/DL — SIGNIFICANT CHANGE UP (ref 2.5–4.5)
PLATELET # BLD AUTO: 329 K/UL — SIGNIFICANT CHANGE UP (ref 150–400)
POTASSIUM SERPL-MCNC: 3.5 MMOL/L — SIGNIFICANT CHANGE UP (ref 3.5–5.3)
POTASSIUM SERPL-SCNC: 3.5 MMOL/L — SIGNIFICANT CHANGE UP (ref 3.5–5.3)
RAPID RVP RESULT: SIGNIFICANT CHANGE UP
RBC # BLD: 3.71 M/UL — LOW (ref 3.8–5.2)
RBC # FLD: 13.1 % — SIGNIFICANT CHANGE UP (ref 10.3–14.5)
RSV RNA SPEC QL NAA+PROBE: SIGNIFICANT CHANGE UP
RV+EV RNA SPEC QL NAA+PROBE: SIGNIFICANT CHANGE UP
SARS-COV-2 RNA SPEC QL NAA+PROBE: SIGNIFICANT CHANGE UP
SODIUM SERPL-SCNC: 143 MMOL/L — SIGNIFICANT CHANGE UP (ref 135–145)
SPECIMEN SOURCE: SIGNIFICANT CHANGE UP
SPECIMEN SOURCE: SIGNIFICANT CHANGE UP
TSH RECEP AB FLD-ACNC: <1.1 IU/L — SIGNIFICANT CHANGE UP (ref 0–1.75)
WBC # BLD: 12.58 K/UL — HIGH (ref 3.8–10.5)
WBC # FLD AUTO: 12.58 K/UL — HIGH (ref 3.8–10.5)

## 2023-08-11 PROCEDURE — 99232 SBSQ HOSP IP/OBS MODERATE 35: CPT

## 2023-08-11 PROCEDURE — 99233 SBSQ HOSP IP/OBS HIGH 50: CPT

## 2023-08-11 PROCEDURE — 71045 X-RAY EXAM CHEST 1 VIEW: CPT | Mod: 26

## 2023-08-11 PROCEDURE — 99223 1ST HOSP IP/OBS HIGH 75: CPT | Mod: GC

## 2023-08-11 RX ORDER — IPRATROPIUM/ALBUTEROL SULFATE 18-103MCG
3 AEROSOL WITH ADAPTER (GRAM) INHALATION EVERY 4 HOURS
Refills: 0 | Status: DISCONTINUED | OUTPATIENT
Start: 2023-08-11 | End: 2023-08-12

## 2023-08-11 RX ORDER — TIOTROPIUM BROMIDE 18 UG/1
2 CAPSULE ORAL; RESPIRATORY (INHALATION) DAILY
Refills: 0 | Status: DISCONTINUED | OUTPATIENT
Start: 2023-08-11 | End: 2023-08-11

## 2023-08-11 RX ORDER — HYDROCORTISONE 20 MG
20 TABLET ORAL
Refills: 0 | Status: DISCONTINUED | OUTPATIENT
Start: 2023-08-12 | End: 2023-08-12

## 2023-08-11 RX ORDER — BUDESONIDE AND FORMOTEROL FUMARATE DIHYDRATE 160; 4.5 UG/1; UG/1
2 AEROSOL RESPIRATORY (INHALATION)
Refills: 0 | Status: DISCONTINUED | OUTPATIENT
Start: 2023-08-11 | End: 2023-08-12

## 2023-08-11 RX ORDER — TIOTROPIUM BROMIDE 18 UG/1
2 CAPSULE ORAL; RESPIRATORY (INHALATION) DAILY
Refills: 0 | Status: DISCONTINUED | OUTPATIENT
Start: 2023-08-11 | End: 2023-08-12

## 2023-08-11 RX ADMIN — Medication 1 APPLICATION(S): at 12:01

## 2023-08-11 RX ADMIN — Medication 1 APPLICATION(S): at 01:06

## 2023-08-11 RX ADMIN — Medication 3 MILLILITER(S): at 15:09

## 2023-08-11 RX ADMIN — Medication 25 MILLIGRAM(S): at 17:30

## 2023-08-11 RX ADMIN — ENOXAPARIN SODIUM 40 MILLIGRAM(S): 100 INJECTION SUBCUTANEOUS at 11:43

## 2023-08-11 RX ADMIN — Medication 81 MILLIGRAM(S): at 11:43

## 2023-08-11 RX ADMIN — Medication 1 APPLICATION(S): at 05:46

## 2023-08-11 RX ADMIN — Medication 3 MILLILITER(S): at 22:50

## 2023-08-11 RX ADMIN — Medication 25 MILLIGRAM(S): at 05:47

## 2023-08-11 RX ADMIN — Medication 3 MILLILITER(S): at 11:20

## 2023-08-11 NOTE — PROGRESS NOTE ADULT - ASSESSMENT
Patient is a 67y F PMHx HTN, small cell lung cancer (s/p chemo and immunotherapy, now on maintenance Atezolizumab as of 2022) , presenting with lightheadedness for 2 days and intermittent diarrhea. Pt hypotensive at urgent care with BP 81/54 in ED, minimal improvement following multiple fluid boluses, with serum cortisol 0.5, consistent with adrenal insufficiency. Patient with positive response and symptomatic improvement on hydrocortisone.     #Adrenal Insufficiency - suspect secondary AI  - Pt with serum cortisol 0.5, diagnostic of adrenal insufficiency, which is a known complication of checkpoint inhibitor immunotherapy. There is not need for cosyntropin testing at this point.  - ACTH low (<2)  - Free T4 0.6  (has been <1 since admission)   - deferred pituitary MRI (pt has claustrophobia and does not have headaches which are usually seen in hypophysitis)  - Hydrocortisone plans/ Discharge plannin mg BID x 2 days --> 20 mg AM, 10 mg PM until Endocrine follow up.   - Repeat prolactin and macroprolactin in 1-2 weeks outpatient; prolactin 25.7 on , asymptomatic    To help with discharge planning, Please print and give the pt info section for patients under adrenal insufficiency (this will educate the patient regarding the warning signs of adrenal crisis )   -  patient that they should take 2-3x her home dose in cases of illness, fever, accidents, and surgery  - pt should receive stress dosing (hydrocortisone 50mg q8) with any major illness or surgical procedure  - Should pt be unable to tolerate PO and is unable to take hydrocortisone, they will need an emergency injection. Please discharge with a prescription for 100 mg Solu-Cortef Act-O-Vial and the following instructions:  [email or web address]  - pt should obtain a medical alert bracelet or necklace to inform emergency providers that they have adrenal insufficiency  [email or web address]  - Patient should also monitor BP closely at home    #Hyperthyroidism   - Patient unsure as to cause of hyperthyroidism, denies any active symptoms. Enlarged thyroid on exam.   - TSH NORMAL 3.07, free thyroxine LOW at 0.8  (<1 since admission)  - abnormal TFTs could be due to overtreatment of hyperthyroidism (although would suspect TSH to be high), nonthyroidal illness,  vs hypophysitis i/s/o checkpoint inhibitor therapy with low FT4 from central process  - On methimazole 5mg at home; held since patient admitted  - Continue to hold methimazole upon discharge   - Please f/u TSH receptor Ab. Thyroid stimulating immunoglobulin Ab was negative   - Patient will need to repeat free T4  within 1-2 weeks of discharge with outpatient endocrinologist, Dr. Santos Bhakta MD  Attending Physician   Department of Endocrinology, Diabetes and Metabolism   [employer] Teams on 23 @ 15:39    If before 9AM or after 5PM, or on weekends/holidays, please call the Endocrine answering service for assistance ([phone]).  For nonurgent matters, please email [email or web address] for assistance.

## 2023-08-11 NOTE — PROGRESS NOTE ADULT - SUBJECTIVE AND OBJECTIVE BOX
HPI: Patient is a 67y F PMHx HTN, small cell lung cancer (s/p chemo and immunotherapy, now on maintenance Atezolizumab as of Nov 2022) , presenting with lightheadedness for 2 days and intermittent diarrhea. Pt hypotensive at urgent care with BP 81/54 in ED, minimal improvement following multiple fluid boluses, with serum cortisol 0.5, consistent with adrenal insufficiency. Patient with positive response and symptomatic improvement on hydrocortisone.      Interval history:  BP stable  Pt feeling a bit better, not on NC at rest but still SOB sometimes    MEDICATIONS  (STANDING):  albuterol/ipratropium for Nebulization 3 milliLiter(s) Nebulizer every 4 hours  aspirin enteric coated 81 milliGRAM(s) Oral daily  budesonide 160 MICROgram(s)/formoterol 4.5 MICROgram(s) Inhaler 2 Puff(s) Inhalation two times a day  enoxaparin Injectable 40 milliGRAM(s) SubCutaneous every 24 hours  hydrocortisone 25 milliGRAM(s) Oral two times a day  lactated ringers. 1000 milliLiter(s) (75 mL/Hr) IV Continuous <Continuous>  tobramycin 0.3% Ointment 1 Application(s) Left EYE every 6 hours    MEDICATIONS  (PRN):  albuterol    90 MICROgram(s) HFA Inhaler 2 Puff(s) Inhalation every 6 hours PRN Shortness of Breath      Allergies    Tylenol (Stomach Upset)    Intolerances        Review of Systems:  Constitutional: No fever, good appetite/po intake  Eyes: No blurry vision, diplopia  Neuro: No tremors  HEENT: No pain  Cardiovascular: No chest pain, palpitations  Respiratory: No SOB, no cough  GI: No nausea, vomiting,   : No dysuria, hematuria  Skin: no rash  Psych: no depression  Endocrine: no polyuria, polydipsia  Hem/lymph: no swelling  Osteoporosis: no fractures    ALL OTHER SYSTEMS REVIEWED AND NEGATIVE    PHYSICAL EXAM:  VITALS: T(C): 36.9 (08-11-23 @ 12:13)  T(F): 98.4 (08-11-23 @ 12:13), Max: 98.8 (08-10-23 @ 22:00)  HR: 83 (08-11-23 @ 12:13) (67 - 94)  BP: 135/73 (08-11-23 @ 12:13) (122/74 - 144/72)  RR:  (16 - 19)  SpO2:  (89% - 100%)  Wt(kg): --  GENERAL: NAD, well-groomed, well-developed  EYES: No proptosis, no lid lag, anicteric  HEENT:  Atraumatic, normocephalic, moist mucous membranes  RESPIRATORY: nonlabored respirations, no wheezing  PSYCH: Alert and oriented x 3, normal affect, normal mood                              11.4   12.58 )-----------( 329      ( 11 Aug 2023 05:38 )             34.6       08-11    143  |  102  |  23  ----------------------------<  80  3.5   |  33<H>  |  1.27    eGFR: 47<L>    Ca    8.9      08-11  Mg     1.60     08-11  Phos  2.9     08-11    TPro  6.8  /  Alb  3.5  /  TBili  0.2  /  DBili  x   /  AST  18  /  ALT  10  /  AlkPhos  31<L>  08-10      Thyroid Function Tests:  08-09 @ 06:56 TSH -- FreeT4 0.6 T3 -- Anti TPO -- Anti Thyroglobulin Ab -- TSI --  08-08 @ 11:59 TSH 0.86 FreeT4 0.7 T3 -- Anti TPO -- Anti Thyroglobulin Ab -- TSI --          Radiology:

## 2023-08-11 NOTE — CONSULT NOTE ADULT - ASSESSMENT
67 year old female with history of HTN, hyperthyroidism, stage 4 small cell lung cancer (July 2022), on Atezolizumab maintenance since November 2022 last cycle on 07/25/2023 admitted for initially presumed sepsis due to hypotension, leukocytosis, and symptomatology of viral GI/UTI found to have adrenal insufficiently likely secondary to Atezolizumab.     Pulmonology consulted due to hypoxia of 89% during ambulation. Venous blood gas on 08/07 shows ph 7.22, CO2 52, O2 38, Lactate 1.5. CXR on 08/09 shows clear lung fields. Low suspicion for PE at this time given patient is not tachycardiac, has been on anticoagulation during admission, relatively active, and transient nature of hypoxia.     Recommendations:       67 year old female with history of HTN, hyperthyroidism, stage 4 small cell lung cancer (July 2022), on Atezolizumab maintenance since November 2022 last cycle on 07/25/2023 admitted for initially presumed sepsis due to hypotension, leukocytosis, and symptomatology of viral GI/UTI found to have adrenal insufficiently likely secondary to Atezolizumab.     Pulmonology consulted due to hypoxia of 89% during ambulation. Venous blood gas on 08/07 shows ph 7.22, CO2 52, O2 38, Lactate 1.5. CXR on 08/09 shows clear lung fields. Low suspicion for PE at this time given patient is not tachycardiac, has been on anticoagulation during admission, relatively active, and transient nature of hypoxia.     Recommendations:  - patient with wheezing noted on re-examination today  - CXR clear and RVP negative  - O2 sat 99% at rest desaturating to 89% on ambulation likely from bronchospasm  - low suspicion for PE as patient on AC inpatient; not tachycardic at rest and no chest pain, pleurisy or dyspnea  - would treat with symbicort BID and spiriva daily; c/w nebs for the next 24 hours  - check ambulatory O2 tomorrow--> does not need home O2 if sat > 88% on ambulation    Please email [email or web address] on day of discharge for a follow up appointment with pulmonary within 1 week of discharge.      67 year old female with history of HTN, hyperthyroidism, stage 4 small cell lung cancer (July 2022), on Atezolizumab maintenance since November 2022 last cycle on 07/25/2023 admitted for initially presumed sepsis due to hypotension, leukocytosis, and symptomatology of viral GI/UTI found to have adrenal insufficiently likely secondary to Atezolizumab.     Pulmonology consulted due to hypoxia of 89% during ambulation. Venous blood gas on 08/07 shows ph 7.22, CO2 52, O2 38, Lactate 1.5. CXR on 08/09 shows clear lung fields. Low suspicion for PE at this time given patient is not tachycardiac, has been on anticoagulation during admission, relatively active, and transient nature of hypoxia.     Recommendations:  - patient with wheezing noted on re-examination today  - CXR clear and RVP negative  - O2 sat 99% at rest desaturating to 89% on ambulation likely from bronchospasm  - would treat with symbicort BID and spiriva daily; c/w nebs for the next 24 hours  - check ambulatory O2 tomorrow--> does not need home O2 if sat > 88% on ambulation  - low suspicion for PE as patient on AC inpatient; not tachycardic at rest and no chest pain, pleurisy or dyspnea; if patient with persistent hypoxemia or dyspnea on exertion despite tx of obstructive lung disease can check Ddimer and if elevated obtain CTA    Please email [email or web address] on day of discharge for a follow up appointment with pulmonary within 1 week of discharge.      67 year old female with history of HTN, hyperthyroidism, stage 4 small cell lung cancer (July 2022), on Atezolizumab maintenance since November 2022 last cycle on 07/25/2023 admitted for initially presumed sepsis due to hypotension, leukocytosis, and symptomatology of viral GI/UTI found to have adrenal insufficiently likely secondary to Atezolizumab.     Pulmonology consulted due to hypoxia of 89% during ambulation. Venous blood gas on 08/07 shows ph 7.22, CO2 52, O2 38, Lactate 1.5. CXR on 08/09 shows clear lung fields. Low suspicion for PE at this time given patient is not tachycardiac, has been on anticoagulation during admission, relatively active, and transient nature of hypoxia.     Recommendations:  - patient with wheezing noted on re-examination today  - CXR clear and RVP negative  - O2 sat 99% at rest desaturating to 89% on ambulation likely from bronchospasm  - would treat with symbicort BID and spiriva daily; c/w albuterol nebs for the next 24 hours  - check ambulatory O2 tomorrow--> does not need home O2 if sat > 88% on ambulation  - low suspicion for PE as patient on AC inpatient; not tachycardic at rest and no chest pain, pleurisy or dyspnea; if patient with persistent hypoxemia or dyspnea on exertion despite tx of obstructive lung disease can check Ddimer and if elevated obtain CTA    Please email [email or web address] on day of discharge for a follow up appointment with pulmonary within 1 week of discharge.

## 2023-08-11 NOTE — PROGRESS NOTE ADULT - SUBJECTIVE AND OBJECTIVE BOX
Dr. Katya Novak  Pager [phone]    PROGRESS NOTE:     Patient is a 67y old  Female who presents with a chief complaint of s/p surgery (10 Aug 2023 11:59)      SUBJECTIVE / OVERNIGHT EVENTS: denies chest pain, still c/o exertional dyspnea, satting 100% on RA, but desat to 89% after ambulation  ADDITIONAL REVIEW OF SYSTEMS: afebrile     MEDICATIONS  (STANDING):  albuterol/ipratropium for Nebulization 3 milliLiter(s) Nebulizer every 4 hours  aspirin enteric coated 81 milliGRAM(s) Oral daily  budesonide 160 MICROgram(s)/formoterol 4.5 MICROgram(s) Inhaler 2 Puff(s) Inhalation two times a day  enoxaparin Injectable 40 milliGRAM(s) SubCutaneous every 24 hours  hydrocortisone 25 milliGRAM(s) Oral two times a day  lactated ringers. 1000 milliLiter(s) (75 mL/Hr) IV Continuous <Continuous>  tiotropium 2.5 MICROgram(s) Inhaler 2 Puff(s) Inhalation daily  tobramycin 0.3% Ointment 1 Application(s) Left EYE every 6 hours    MEDICATIONS  (PRN):  albuterol    90 MICROgram(s) HFA Inhaler 2 Puff(s) Inhalation every 6 hours PRN Shortness of Breath      CAPILLARY BLOOD GLUCOSE        I&O's Summary    10 Aug 2023 07:01  -  11 Aug 2023 07:00  --------------------------------------------------------  IN: 240 mL / OUT: 1300 mL / NET: -1060 mL        PHYSICAL EXAM:  Vital Signs Last 24 Hrs  T(C): 37.1 (11 Aug 2023 08:34), Max: 37.1 (10 Aug 2023 22:00)  T(F): 98.7 (11 Aug 2023 08:34), Max: 98.8 (10 Aug 2023 22:00)  HR: 81 (11 Aug 2023 08:34) (67 - 94)  BP: 124/58 (11 Aug 2023 08:34) (122/74 - 144/72)  BP(mean): --  RR: 19 (11 Aug 2023 10:21) (17 - 19)  SpO2: 89% (11 Aug 2023 10:21) (89% - 100%)    Parameters below as of 11 Aug 2023 10:21  Patient On (Oxygen Delivery Method): room air      CONSTITUTIONAL: NAD, well-developed  RESPIRATORY: scattered wheeze  CARDIOVASCULAR: Regular rate and rhythm, normal S1 and S2, no murmur/rub/gallop; 1+ lower extremity edema; Peripheral pulses are 2+ bilaterally  ABDOMEN: Nontender to palpation, normoactive bowel sounds, no rebound/guarding; No hepatosplenomegaly  MUSCULOSKELETAL: no clubbing or cyanosis of digits; no joint swelling or tenderness to palpation  PSYCH: A+O to person, place, and time; affect appropriate    LABS:                        11.4   12.58 )-----------( 329      ( 11 Aug 2023 05:38 )             34.6     08-11    143  |  102  |  23  ----------------------------<  80  3.5   |  33<H>  |  1.27    Ca    8.9      11 Aug 2023 05:38  Phos  2.9     08-11  Mg     1.60     08-11    TPro  6.8  /  Alb  3.5  /  TBili  0.2  /  DBili  x   /  AST  18  /  ALT  10  /  AlkPhos  31<L>  08-10          Urinalysis Basic - ( 11 Aug 2023 05:38 )    Color: x / Appearance: x / SG: x / pH: x  Gluc: 80 mg/dL / Ketone: x  / Bili: x / Urobili: x   Blood: x / Protein: x / Nitrite: x   Leuk Esterase: x / RBC: x / WBC x   Sq Epi: x / Non Sq Epi: x / Bacteria: x          RADIOLOGY & ADDITIONAL TESTS:  Results Reviewed:   Imaging Personally Reviewed:  Electrocardiogram Personally Reviewed:    COORDINATION OF CARE:  Care Discussed with Consultants/Other Providers [Y/N]:  Prior or Outpatient Records Reviewed [Y/N]:

## 2023-08-11 NOTE — CONSULT NOTE ADULT - SUBJECTIVE AND OBJECTIVE BOX
CHIEF COMPLAINT:    HPI:  67 year old female with history of HTN, hyperthyroidism, stage 4 small cell lung cancer (July 2022), on Atezolizumab maintenance since November 2022 last cycle on 07/25/2023, presents with 3 day history of body aches, chills, dry cough, and diarrhea. Was evaluated at an urgent care facility and found to be hypotensive prompting her to seek ED evaluation. Patient seen in our ED and found to be hypotensive as well. Initially treated for sepsis likely due to GI/UTI source. Patient received 3 days of IV abx, negative UA, negative RVP. No growth on urine Cx and blood cx to date. Clear chest X-ray. On further investigation of hypotension, adrenal insufficiency may be caused by Atezolizumab Cortisol level was found to be 0.5. Patient started on Hydrocortisone therapy as per Endocrinology. On 08/10/2023 patient was noted to be dyspneic and hypoxic during ambulating with wheezing on exam. She improved s/p sitting down, duonebs, and Symbicort. Patient had a similar episode today (08/11/2023) of oxygen saturation of 89% on ambulation prompting pulmonology consultation. Patient herself denies wheezing sensation herself. Denies chest pain, fevers, productive cough, hemoptysis. Patient attributes current dry cough to her post-nasal drip    Patient does not see a pulmonologist. Small cell lung cancer managed by outpatient oncologist. No pulmonary history prior to small cell lung cancer diagnosis in 2022. 20-30 year history of smoking 1 pack every 2 days. Quit smoking 1 year ago. Denies vaping and illicit drug use. Formerly worked for the [employer] however is now retired. Originally born in the [place], no recent travel. Lives at home with her son. No difficulties with ADRs, no oxygen use at home, no dyspnea when doing everyday tasks. No pets at home.     PAST MEDICAL & SURGICAL HISTORY:  HTN (hypertension)      Hyperthyroidism      Lung cancer      History of cholecystectomy  1989          FAMILY HISTORY:  FH: HTN (hypertension) (Mother)        SOCIAL HISTORY:  Smoking: [ ] Never Smoked [X ] Former Smoker [ ] Current Smoker  (__ packs x ___ years)  Substance Use: [ X] Never Used [ ] Used ____  EtOH Use:  Marital Status: [ X] Single [ ]  [ ]  [ ]   Sexual History:   Occupation: Retired  Recent Travel: No  Country of Birth: [place]  Advance Directives:    Allergies    Tylenol (Stomach Upset)    Intolerances        HOME MEDICATIONS:    REVIEW OF SYSTEMS:  Constitutional: [ ] negative [ ] fevers [X ] chills [ ] weight loss [ ] weight gain  HEENT: [ ] negative [ ] dry eyes [ ] eye irritation X[ ] postnasal drip [ ] nasal congestion  CV: [ X] negative  [ ] chest pain [ ] orthopnea [ ] palpitations [ ] murmur  Resp: [ ] negative [X ] cough [ X] shortness of breath [X ] dyspnea [ ] wheezing [ ] sputum [ ] hemoptysis  GI: [ ] negative [ ] nausea [ ] vomiting [ X] diarrhea [ ] constipation [ ] abd pain [ ] dysphagia   : [ X] negative [ ] dysuria [ ] nocturia [ ] hematuria [ ] increased urinary frequency  Musculoskeletal: [ ] negative [ ] back pain [X ] myalgias [ ] arthralgias [ ] fracture  Skin: [X ] negative [ ] rash [ ] itch  Neurological: [X ] negative [ ] headache [ ] dizziness [ ] syncope [ ] weakness [ ] numbness  Psychiatric: [X ] negative [ ] anxiety [ ] depression  Endocrine: [ X] negative [ ] diabetes [ ] thyroid problem  Hematologic/Lymphatic: [X ] negative [ ] anemia [ ] bleeding problem  Allergic/Immunologic: [X ] negative [ ] itchy eyes [ ] nasal discharge [ ] hives [ ] angioedema  [X ] All other systems negative    OBJECTIVE:  ICU Vital Signs Last 24 Hrs  T(C): 36.9 (11 Aug 2023 12:13), Max: 37.1 (10 Aug 2023 22:00)  T(F): 98.4 (11 Aug 2023 12:13), Max: 98.8 (10 Aug 2023 22:00)  HR: 83 (11 Aug 2023 12:13) (67 - 94)  BP: 135/73 (11 Aug 2023 12:13) (122/74 - 144/72)  BP(mean): --  ABP: --  ABP(mean): --  RR: 16 (11 Aug 2023 12:13) (16 - 19)  SpO2: 96% (11 Aug 2023 12:13) (89% - 100%)    O2 Parameters below as of 11 Aug 2023 12:13  Patient On (Oxygen Delivery Method): room air              08-10 @ 07:01  -  08-11 @ 07:00  --------------------------------------------------------  IN: 240 mL / OUT: 1300 mL / NET: -1060 mL      CAPILLARY BLOOD GLUCOSE          PHYSICAL EXAM:  CONSTITUTIONAL: NAD, well-developed  RESPIRATORY: Normal respiratory effort; lungs are clear to auscultation bilaterally  CARDIOVASCULAR: Regular rate and rhythm, normal S1 and S2, no murmur/rub/gallop;; Peripheral pulses are 2+ bilaterally  ABDOMEN: Nontender to palpation, normoactive bowel sounds, no rebound/guarding  MUSCULOSKELETAL: no clubbing or cyanosis of digits; no joint swelling or tenderness to palpation  PSYCH: A+O to person, place, and time; affect appropriate    HOSPITAL MEDICATIONS:  aspirin enteric coated 81 milliGRAM(s) Oral daily  enoxaparin Injectable 40 milliGRAM(s) SubCutaneous every 24 hours        hydrocortisone 25 milliGRAM(s) Oral two times a day    albuterol    90 MICROgram(s) HFA Inhaler 2 Puff(s) Inhalation every 6 hours PRN  albuterol/ipratropium for Nebulization 3 milliLiter(s) Nebulizer every 4 hours  budesonide 160 MICROgram(s)/formoterol 4.5 MICROgram(s) Inhaler 2 Puff(s) Inhalation two times a day            lactated ringers. 1000 milliLiter(s) IV Continuous <Continuous>      tobramycin 0.3% Ointment 1 Application(s) Left EYE every 6 hours        LABS:                        11.4   12.58 )-----------( 329      ( 11 Aug 2023 05:38 )             34.6     Hgb Trend: 11.4<--, 10.4<--, 10.7<--, 11.5<--, 11.8<--  08-11    143  |  102  |  23  ----------------------------<  80  3.5   |  33<H>  |  1.27    Ca    8.9      11 Aug 2023 05:38  Phos  2.9     08-11  Mg     1.60     08-11    TPro  6.8  /  Alb  3.5  /  TBili  0.2  /  DBili  x   /  AST  18  /  ALT  10  /  AlkPhos  31<L>  08-10    Creatinine Trend: 1.27<--, 1.41<--, 1.47<--, 1.43<--, 1.71<--, 1.68<--    Urinalysis Basic - ( 11 Aug 2023 05:38 )    Color: x / Appearance: x / SG: x / pH: x  Gluc: 80 mg/dL / Ketone: x  / Bili: x / Urobili: x   Blood: x / Protein: x / Nitrite: x   Leuk Esterase: x / RBC: x / WBC x   Sq Epi: x / Non Sq Epi: x / Bacteria: x    RADIOLOGY:  EXAM: AP chest    FINDINGS:  The lungs are clear. The heart is not enlarged and there is no effusion   or pneumothorax.    The bones are intact.        COMPARISON: August 6        IMPRESSION: Clear lungs.

## 2023-08-11 NOTE — PROGRESS NOTE ADULT - ASSESSMENT
67y Female  with PMH of extensive stage small cell lung cancer on maintenance immunotherapy ( w/ atezolizumab , cycle 14 on 7/25)  presenting with lightheadedness , body aches, cough and chills x1, found to be septic.  Admitted for sepsis and hypotension  Oncology consulted for further evaluation     Brief onc history: Extensive stage small cell lung cancer. Began first line chemo + immunotherapy with Carboplatin/Etoposide/Atezolizumab in August 2022; achieved [place]. Completed 4 cycles of chemo + immunotherapy in Oct 2022 followed by maintenance Atezolizumab as of Nov 2022.  ?    -Infectious workup ongoing, no growth to date  - C diff panel negative  -Patient with considerable hypotension on admission, AM cortisol low, concern for adrenal insufficiency from immunotherapy  Appreciate Endocrine consult for workup of hypophysitis , patient s.p stress hydrocortisone with improvement of BP. will followup with Endo as outpatient   - MRI (stand up mri as outpatient) in June 2023 impression can be found in [employer]- was consistent with a   probable microadenoma within the right side of the pituitary gland. An endocrinological evaluation is recommended.   ECHO reviewed  Now with SOB, 8/10 CXR with clear lungs, would rec CTA chest r/o PE and Pulm Consult depending on CT results  -No plans for inpatient chemotherapy  -C/w Supportive care, pain control, Nutrition, PT, DVT ppx  -Rest of care as per primary team  -Patient to followup with Dr. Chou ([facility]) upon discharge  -Oncology will continue to follow with you    Case d/w oncology attending      Edita SIFUENTES  Oncology Physician Assistant  Justa PARKINSON/RETA [facility]    Pager [phone] also available on TEAMS as Edita SIFUENTES    If before 8am/after 5pm or on weekends please page On-call Oncology Fellow

## 2023-08-11 NOTE — PROGRESS NOTE ADULT - PROBLEM SELECTOR PLAN 1
sepsis ruled out, abx dc'd per ID  - pt confirmed to have shock d/t adrenal insufficiency, cortisol low 0.5 , also may have nephritis /colitis from immunotherapy as well,  taper steroid per endo, hydrocortisone 25 mg bid today and tomorrow, then 20 mg bid x 2 days, then 20 mg 8 am and 10mg pm indefinitely, outpt f/u with her endocrinologist Dr. Santos Miller --messaged on [employer] Teams.   - need to r/o hypophysitis, checked pituitary hormone levels  ACTH low 1.8 c/w secondary adrenal insufficiency, other hormones okay (prolactin 25.7, TSH 0.86, FT4 low 0.6, LH 23.7, FSH 20.3, estradiol 33  - TTE (8/8) limited, mild MR, unable to evaluate LV/RV function  - daily labs BMP, Mg, phos  -Hold methimazole   -UCx normal bianca, UA not impressive; Bcx ngtd  -stool neg for C. diff, check GI PCR if diarrhea recurs  -left eye conjunctivitis, c/w tobramycin eye drop, ID signed off  - dispo: inpt pending pulmonary consult for wheezing and poss COPD exacerbation

## 2023-08-11 NOTE — PROGRESS NOTE ADULT - SUBJECTIVE AND OBJECTIVE BOX
INTERVAL HPI/OVERNIGHT EVENTS:  Patient seen at bedside.  Patient with increased SOB on exertion  Very uncommon for her, as per patient  Patient endorsing she came in for low BP which resolved   but now has breathing concerns  Has never been on supp O2 at home    VITAL SIGNS:  T(F): 98.4 (08-11-23 @ 12:13)  HR: 83 (08-11-23 @ 12:13)  BP: 135/73 (08-11-23 @ 12:13)  RR: 16 (08-11-23 @ 12:13)  SpO2: 96% (08-11-23 @ 12:13)  Wt(kg): --    PHYSICAL EXAM:  CONSTITUTIONAL: NAD, well-developed  RESPIRATORY: scattered wheeze  CARDIOVASCULAR: Regular rate and rhythm, normal S1 and S2, no murmur/rub/gallop; 1+ lower extremity edema; Peripheral pulses are 2+ bilaterally  ABDOMEN: Nontender to palpation, normoactive bowel sounds, no rebound/guarding; No hepatosplenomegaly  MUSCULOSKELETAL: no clubbing or cyanosis of digits; no joint swelling or tenderness to palpation  PSYCH: A+O to person, place, and time; affect appropriate      MEDICATIONS  (STANDING):  albuterol/ipratropium for Nebulization 3 milliLiter(s) Nebulizer every 4 hours  aspirin enteric coated 81 milliGRAM(s) Oral daily  budesonide 160 MICROgram(s)/formoterol 4.5 MICROgram(s) Inhaler 2 Puff(s) Inhalation two times a day  enoxaparin Injectable 40 milliGRAM(s) SubCutaneous every 24 hours  hydrocortisone 25 milliGRAM(s) Oral two times a day  lactated ringers. 1000 milliLiter(s) (75 mL/Hr) IV Continuous <Continuous>  tobramycin 0.3% Ointment 1 Application(s) Left EYE every 6 hours    MEDICATIONS  (PRN):  albuterol    90 MICROgram(s) HFA Inhaler 2 Puff(s) Inhalation every 6 hours PRN Shortness of Breath      Allergies    Tylenol (Stomach Upset)    Intolerances        LABS:                        11.4   12.58 )-----------( 329      ( 11 Aug 2023 05:38 )             34.6     08-11    143  |  102  |  23  ----------------------------<  80  3.5   |  33<H>  |  1.27    Ca    8.9      11 Aug 2023 05:38  Phos  2.9     08-11  Mg     1.60     08-11    TPro  6.8  /  Alb  3.5  /  TBili  0.2  /  DBili  x   /  AST  18  /  ALT  10  /  AlkPhos  31<L>  08-10      Urinalysis Basic - ( 11 Aug 2023 05:38 )    Color: x / Appearance: x / SG: x / pH: x  Gluc: 80 mg/dL / Ketone: x  / Bili: x / Urobili: x   Blood: x / Protein: x / Nitrite: x   Leuk Esterase: x / RBC: x / WBC x   Sq Epi: x / Non Sq Epi: x / Bacteria: x        RADIOLOGY & ADDITIONAL TESTS:  Studies reviewed.

## 2023-08-11 NOTE — PROGRESS NOTE ADULT - PROBLEM SELECTOR PLAN 5
pt reports exertional dyspnea, satting 100% on RA at rest, but desat to 89% after ambulation on RA today, home O2 arranged by CM, hx of lung ca and former smoker 1/2 ppd x30 yrs  lungs clear yesterday but today there's scattered wheezing, likely has undiagnosed COPD with COPD exacerbation, already given duoneb 2 nights ago and yesterday changed to symbicort and albuterol inhalers, will add back duoneb q4h and add spiriva.  Pt is already on steroids for adrenal insufficiency.  -f/u CM to see if she can get a nebulizer machine on discharge  - pulmonary consult pt reports exertional dyspnea, satting 100% on RA at rest, but desat to 89% after ambulation on RA today, home O2 arranged by CM, hx of lung ca and former smoker 1/2 ppd x30 yrs  lungs clear yesterday but today there's scattered wheezing, dyspnea likely from undiagnosed COPD with COPD exacerbation and lung cancer, she's already given duoneb 2 nights ago and yesterday changed to symbicort and albuterol inhalers, will add back duoneb q4h and add spiriva.  Pt is already on steroids for adrenal insufficiency.  -f/u CM to see if she can get a nebulizer machine on discharge  - pulmonary consult pt reports exertional dyspnea, satting 100% on RA at rest, but desat to 89% after ambulation on RA today, home O2 arranged by CM, hx of lung ca and former smoker 1/2 ppd x30 yrs.  Pt denies h/o COPD, never had PFT done before.   lungs clear yesterday but today there's scattered wheezing, dyspnea likely from undiagnosed COPD with COPD exacerbation and lung cancer, she's already given duoneb 2 nights ago and yesterday changed to symbicort and albuterol inhalers, will add back duoneb q4h.  Pt is already on steroids for adrenal insufficiency.  -f/u CM to see if she can get a nebulizer machine on discharge  - pulmonary consult today

## 2023-08-11 NOTE — CONSULT NOTE ADULT - ATTENDING COMMENTS
66 yo female with small cell lung cancer on Ctx last 7/25/23, HTN, c/o 3 day diarrheal illness, malaise, cough with phlegm production, chills and body aches over the last 10 days.  Urgent care sent her to ED for low BP.  In ED POCUS indicated hypovolemia and she received a total of 3L crystalloid over the last 9 hours.  Also recieved abx for +UA.  RVP negative, CXR clear, not currently having loose bm.    She is awake, alert and oriented in NAD, no c/o lightheadedness, walked to BR, no current complaints.  Last /57  HR 90's sinus on tele, O2 sat 100% on 2L (95-98% on RA but noted to have transient desat in ED, resolved with deep breaths) Lungs clear, abd soft and NT, no edema. labs reviewed: lactate 2.9, WBC 11 nl diff, SIRI on CKD improved  Receiving 4th liter of crystalloid  Hypotension likely multifactorial: hypovolemia 2/2 diarrhea and poor po intake, acute infection - viral vs bacterial, UTI.  Pt is asymptomatic and nontoxic appearing.    She does not require MICU level of care.  Once volume resuscitated, if MAP falls below 65 can consider po midodrine as long as pt continues to appear as now - nontoxic, asymptomatic, without complaints, and euvolemic.  D/W ED team
Mild exercise induced hypoxemia, likely due to mild COPD. Has faint end-expiratory wheezing on exam. CXR is clear, but with hyperinflation consistent with emphysema. RVP is negative. Would start treatment with Symbicort 160-4.5 mcg 2 puffs twice daily, rinse after use. Would also start Spiriva Respimat 2.5 mcg 2 inhalations once daily. If SpO2>88% with ambulation, she does not require home oxygen. Given underlying stage IV small cell lung cancer on immunotherapy, would consider obtaining a d-dimer and if elevated would obtain a CTPA rule out PE (although low suspicion (Well's score 2.5). Needs close outpatient pulmonary follow-up after discharge. Discussed with patient at bedside.
Patient is a 67y F PMHx HTN, small cell lung cancer (on Atezolizumab - last dose 6/2023) presenting with weakness and hypotension, found to have very low AM cortisol to <1. Started on stress steroids with hydrocortisone with improvement. Continue IV hydrocortisone as above. Continue infectious workup/treatment as well as IV fluid repletion. No need for cosyntropin testing at this level. Pt will need to go home on PO hydrocortisone (likely 20 mg in AM and 10 mg in PM). Check pituitary panel to assess further, as well as graves antibodies to help determine further workup. If Ft4 is low from central process may end up needing levothyroxine (will need to consider this if graves antibodies are negative, and would hold methimazole, after adequate hydrocortisone replacement). Right now picture is confounded given immunotherapy so will need to clarify what is going on. She needs endocrine follow up. Patient is high risk and high level decision making.

## 2023-08-12 ENCOUNTER — NON-APPOINTMENT (OUTPATIENT)
Age: 68
End: 2023-08-12

## 2023-08-12 VITALS
SYSTOLIC BLOOD PRESSURE: 138 MMHG | OXYGEN SATURATION: 98 % | HEART RATE: 95 BPM | RESPIRATION RATE: 18 BRPM | TEMPERATURE: 97 F | DIASTOLIC BLOOD PRESSURE: 78 MMHG

## 2023-08-12 LAB
ANION GAP SERPL CALC-SCNC: 12 MMOL/L — SIGNIFICANT CHANGE UP (ref 7–14)
BASOPHILS # BLD AUTO: 0.03 K/UL — SIGNIFICANT CHANGE UP (ref 0–0.2)
BASOPHILS NFR BLD AUTO: 0.2 % — SIGNIFICANT CHANGE UP (ref 0–2)
BUN SERPL-MCNC: 22 MG/DL — SIGNIFICANT CHANGE UP (ref 7–23)
CALCIUM SERPL-MCNC: 8.6 MG/DL — SIGNIFICANT CHANGE UP (ref 8.4–10.5)
CHLORIDE SERPL-SCNC: 103 MMOL/L — SIGNIFICANT CHANGE UP (ref 98–107)
CO2 SERPL-SCNC: 28 MMOL/L — SIGNIFICANT CHANGE UP (ref 22–31)
CREAT SERPL-MCNC: 1.13 MG/DL — SIGNIFICANT CHANGE UP (ref 0.5–1.3)
D DIMER BLD IA.RAPID-MCNC: 163 NG/ML DDU — SIGNIFICANT CHANGE UP
EGFR: 54 ML/MIN/1.73M2 — LOW
EOSINOPHIL # BLD AUTO: 0.06 K/UL — SIGNIFICANT CHANGE UP (ref 0–0.5)
EOSINOPHIL NFR BLD AUTO: 0.5 % — SIGNIFICANT CHANGE UP (ref 0–6)
GLUCOSE SERPL-MCNC: 78 MG/DL — SIGNIFICANT CHANGE UP (ref 70–99)
HCT VFR BLD CALC: 33.2 % — LOW (ref 34.5–45)
HGB BLD-MCNC: 11 G/DL — LOW (ref 11.5–15.5)
IANC: 6.96 K/UL — SIGNIFICANT CHANGE UP (ref 1.8–7.4)
IMM GRANULOCYTES NFR BLD AUTO: 0.7 % — SIGNIFICANT CHANGE UP (ref 0–0.9)
LYMPHOCYTES # BLD AUTO: 3.92 K/UL — HIGH (ref 1–3.3)
LYMPHOCYTES # BLD AUTO: 31.2 % — SIGNIFICANT CHANGE UP (ref 13–44)
MAGNESIUM SERPL-MCNC: 1.5 MG/DL — LOW (ref 1.6–2.6)
MCHC RBC-ENTMCNC: 30 PG — SIGNIFICANT CHANGE UP (ref 27–34)
MCHC RBC-ENTMCNC: 33.1 GM/DL — SIGNIFICANT CHANGE UP (ref 32–36)
MCV RBC AUTO: 90.5 FL — SIGNIFICANT CHANGE UP (ref 80–100)
MONOCYTES # BLD AUTO: 1.52 K/UL — HIGH (ref 0–0.9)
MONOCYTES NFR BLD AUTO: 12.1 % — SIGNIFICANT CHANGE UP (ref 2–14)
NEUTROPHILS # BLD AUTO: 6.96 K/UL — SIGNIFICANT CHANGE UP (ref 1.8–7.4)
NEUTROPHILS NFR BLD AUTO: 55.3 % — SIGNIFICANT CHANGE UP (ref 43–77)
NRBC # BLD: 0 /100 WBCS — SIGNIFICANT CHANGE UP (ref 0–0)
NRBC # FLD: 0 K/UL — SIGNIFICANT CHANGE UP (ref 0–0)
PHOSPHATE SERPL-MCNC: 3.2 MG/DL — SIGNIFICANT CHANGE UP (ref 2.5–4.5)
PLATELET # BLD AUTO: 315 K/UL — SIGNIFICANT CHANGE UP (ref 150–400)
POTASSIUM SERPL-MCNC: 3.2 MMOL/L — LOW (ref 3.5–5.3)
POTASSIUM SERPL-SCNC: 3.2 MMOL/L — LOW (ref 3.5–5.3)
RBC # BLD: 3.67 M/UL — LOW (ref 3.8–5.2)
RBC # FLD: 12.9 % — SIGNIFICANT CHANGE UP (ref 10.3–14.5)
SODIUM SERPL-SCNC: 143 MMOL/L — SIGNIFICANT CHANGE UP (ref 135–145)
WBC # BLD: 12.58 K/UL — HIGH (ref 3.8–10.5)
WBC # FLD AUTO: 12.58 K/UL — HIGH (ref 3.8–10.5)

## 2023-08-12 PROCEDURE — 99239 HOSP IP/OBS DSCHRG MGMT >30: CPT

## 2023-08-12 RX ORDER — HYDROCORTISONE 20 MG
100 TABLET ORAL
Qty: 1 | Refills: 0
Start: 2023-08-12 | End: 2023-08-12

## 2023-08-12 RX ORDER — HYDROCORTISONE 20 MG
2 TABLET ORAL
Qty: 120 | Refills: 1
Start: 2023-08-12 | End: 2023-10-10

## 2023-08-12 RX ORDER — TIOTROPIUM BROMIDE 18 UG/1
2 CAPSULE ORAL; RESPIRATORY (INHALATION)
Qty: 1 | Refills: 0
Start: 2023-08-12

## 2023-08-12 RX ORDER — ALBUTEROL 90 UG/1
2.5 AEROSOL, METERED ORAL EVERY 6 HOURS
Refills: 0 | Status: DISCONTINUED | OUTPATIENT
Start: 2023-08-12 | End: 2023-08-12

## 2023-08-12 RX ORDER — BUDESONIDE AND FORMOTEROL FUMARATE DIHYDRATE 160; 4.5 UG/1; UG/1
2 AEROSOL RESPIRATORY (INHALATION)
Qty: 0 | Refills: 0 | DISCHARGE
Start: 2023-08-12

## 2023-08-12 RX ORDER — MAGNESIUM SULFATE 500 MG/ML
2 VIAL (ML) INJECTION ONCE
Refills: 0 | Status: COMPLETED | OUTPATIENT
Start: 2023-08-12 | End: 2023-08-12

## 2023-08-12 RX ORDER — POTASSIUM CHLORIDE 20 MEQ
40 PACKET (EA) ORAL EVERY 4 HOURS
Refills: 0 | Status: COMPLETED | OUTPATIENT
Start: 2023-08-12 | End: 2023-08-12

## 2023-08-12 RX ORDER — ALBUTEROL 90 UG/1
2 AEROSOL, METERED ORAL
Qty: 1 | Refills: 0
Start: 2023-08-12

## 2023-08-12 RX ADMIN — BUDESONIDE AND FORMOTEROL FUMARATE DIHYDRATE 2 PUFF(S): 160; 4.5 AEROSOL RESPIRATORY (INHALATION) at 08:29

## 2023-08-12 RX ADMIN — Medication 40 MILLIEQUIVALENT(S): at 11:37

## 2023-08-12 RX ADMIN — Medication 40 MILLIEQUIVALENT(S): at 15:31

## 2023-08-12 RX ADMIN — Medication 20 MILLIGRAM(S): at 15:31

## 2023-08-12 RX ADMIN — Medication 81 MILLIGRAM(S): at 11:38

## 2023-08-12 RX ADMIN — Medication 20 MILLIGRAM(S): at 06:24

## 2023-08-12 RX ADMIN — ALBUTEROL 2.5 MILLIGRAM(S): 90 AEROSOL, METERED ORAL at 11:17

## 2023-08-12 RX ADMIN — ENOXAPARIN SODIUM 40 MILLIGRAM(S): 100 INJECTION SUBCUTANEOUS at 11:38

## 2023-08-12 RX ADMIN — TIOTROPIUM BROMIDE 2 PUFF(S): 18 CAPSULE ORAL; RESPIRATORY (INHALATION) at 10:29

## 2023-08-12 RX ADMIN — Medication 25 GRAM(S): at 11:35

## 2023-08-12 NOTE — PROGRESS NOTE ADULT - PROBLEM SELECTOR PROBLEM 1
Adrenal insufficiency
Sepsis
Sepsis
Adrenal insufficiency
Sepsis
Adrenal insufficiency
Sepsis

## 2023-08-12 NOTE — PROGRESS NOTE ADULT - PROBLEM SELECTOR PROBLEM 6
Need for prophylactic measure
Need for prophylactic measure
Hypertension
Hypertension
Need for prophylactic measure
Hypertension

## 2023-08-12 NOTE — PROGRESS NOTE ADULT - PROBLEM SELECTOR PLAN 3
Stage IV small cell lung cancer  - being treated by Dr. Sánchez Chou   - Began first line chemo + immunotherapy with Carboplatin/Etoposide/Atezolizumab in August 2022; achieved [place]. - Completed 4 cycles of chemo + immunotherapy in Oct 2022 followed by maintenance immunotherapy Atezolizumab as of Nov 2022, had cycle 14 on 7/25  - Restaging PET/CT June 2023 with overall sustained systemic response.   - Oncology following, no plan for inpt chemo or inpt MRI
Stage IV small cell lung cancer  - being treated by Dr. Sánchez Chou   - Began first line chemo + immunotherapy with Carboplatin/Etoposide/Atezolizumab in August 2022; achieved [place]. - Completed 4 cycles of chemo + immunotherapy in Oct 2022 followed by maintenance immunotherapy Atezolizumab as of Nov 2022, had cycle 14 on 7/25  - Restaging PET/CT June 2023 with overall sustained systemic response.   - Oncology following, no plan for inpt chemo or inpt MRI
Stage IV small cell lung cancer  - being treated by Dr. Sánchez Chou   - Began first line chemo + immunotherapy with Carboplatin/Etoposide/Atezolizumab in August 2022; achieved [place]. - Completed 4 cycles of chemo + immunotherapy in Oct 2022 followed by maintenance immunotherapy Atezolizumab as of Nov 2022, had cycle 14 on 7/25  - Restaging PET/CT June 2023 with overall sustained systemic response.   - Oncology following, no plan for inpt chemo or inpt MRI
Stage IV small cell lung cancer  - being treated by Dr. Sánchez Chou   - Began first line chemo + immunotherapy with Carboplatin/Etoposide/Atezolizumab in August 2022; achieved [place]. - Completed 4 cycles of chemo + immunotherapy in Oct 2022 followed by maintenance immunotherapy Atezolizumab as of Nov 2022, had cycle 14 on 7/25  - Restaging PET/CT June 2023 with overall sustained systemic response.   - Oncology following, no plan for inpt chemo or inpt MRI
Stage IV small cell lung cancer  - being treated by Dr. Sánchez Chou   - Began first line chemo + immunotherapy with Carboplatin/Etoposide/Atezolizumab in August 2022; achieved [place]. - Completed 4 cycles of chemo + immunotherapy in Oct 2022 followed by maintenance immunotherapy Atezolizumab as of Nov 2022, had cycle 14 on 7/25  - Restaging PET/CT June 2023 with overall sustained systemic response.   - Oncology consult recs appreciated, pt reports she can only have open MRI d/t claustrophobia
Stage IV small cell lung cancer  - being treated by Dr. Sánchez Chou   - Began first line chemo + immunotherapy with Carboplatin/Etoposide/Atezolizumab in August 2022; achieved [place]. - Completed 4 cycles of chemo + immunotherapy in Oct 2022 followed by maintenance immunotherapy Atezolizumab as of Nov 2022, had cycle 14 on 7/25  - Restaging PET/CT June 2023 with overall sustained systemic response.   - Oncology consult recs appreciated, pt reports she can only have open MRI d/t claustrophobia

## 2023-08-12 NOTE — PROGRESS NOTE ADULT - PROVIDER SPECIALTY LIST ADULT
Infectious Disease
Endocrinology
Endocrinology
Heme/Onc
Heme/Onc
Endocrinology
Hospitalist

## 2023-08-12 NOTE — PROGRESS NOTE ADULT - PROBLEM SELECTOR PLAN 2
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

## 2023-08-12 NOTE — PROGRESS NOTE ADULT - SUBJECTIVE AND OBJECTIVE BOX
ENDOCRINE FOLLOW UP     Chief Complaint: AI    History: VSS, no missed hydrocortisone doses, currently on 20mg BID. VSS. Seen at bedside, no complaints    MEDICATIONS  (STANDING):  albuterol    0.083% 2.5 milliGRAM(s) Nebulizer every 6 hours  aspirin enteric coated 81 milliGRAM(s) Oral daily  budesonide 160 MICROgram(s)/formoterol 4.5 MICROgram(s) Inhaler 2 Puff(s) Inhalation two times a day  enoxaparin Injectable 40 milliGRAM(s) SubCutaneous every 24 hours  hydrocortisone 20 milliGRAM(s) Oral two times a day  lactated ringers. 1000 milliLiter(s) (75 mL/Hr) IV Continuous <Continuous>  potassium chloride    Tablet ER 40 milliEquivalent(s) Oral every 4 hours  tiotropium 2.5 MICROgram(s) Inhaler 2 Puff(s) Inhalation daily  tobramycin 0.3% Ointment 1 Application(s) Left EYE every 6 hours    MEDICATIONS  (PRN):  albuterol    90 MICROgram(s) HFA Inhaler 2 Puff(s) Inhalation every 6 hours PRN Shortness of Breath      Allergies    Tylenol (Stomach Upset)    Intolerances        ROS: All other systems reviewed and negative    PHYSICAL EXAM:  VITALS: T(C): 36.3 (08-12-23 @ 12:38)  T(F): 97.4 (08-12-23 @ 12:38), Max: 99 (08-12-23 @ 08:42)  HR: 95 (08-12-23 @ 12:38) (83 - 97)  BP: 138/78 (08-12-23 @ 12:38) (101/73 - 139/78)  RR:  (17 - 18)  SpO2:  (95% - 100%)  Wt(kg): --  GENERAL: NAD, resting comfortably   EYES: No proptosis,  anicteric  HEENT:  Atraumatic, Normocephalic, moist mucous membranes  RESPIRATORY: Nonlabored respirations on room air, normal rate/effort   NEURO: AOx3, moves all extremities spontaenuously   PSYCH:  reactive affect, euthymic mood        08-12    143  |  103  |  22  ----------------------------<  78  3.2<L>   |  28  |  1.13    eGFR: 54<L>    Ca    8.6      08-12  Mg     1.50     08-12  Phos  3.2     08-12    TPro  6.8  /  Alb  3.5  /  TBili  0.2  /  DBili  x   /  AST  18  /  ALT  10  /  AlkPhos  31<L>  08-10          Thyroid Stimulating Hormone, Serum: 0.86 uIU/mL (08-08-23 @ 11:59)  Thyroid Stimulating Hormone, Serum: 3.07 uIU/mL (08-07-23 @ 06:24)

## 2023-08-12 NOTE — PROGRESS NOTE ADULT - ASSESSMENT
Patient is a 67y F PMHx HTN, Lung CA (on chemo, last 7/22/23), presents with complaints of being lightheaded, body aches, cough and chills x1, found to have shock likely d/t adrenal insufficiency, likely related to immunotherapy, course c/b hypoxia and dyspnea likely d/t undiagnosed COPD , Improved w/ bronchodilator

## 2023-08-12 NOTE — PROGRESS NOTE ADULT - ASSESSMENT
Patient is a 67y F PMHx HTN, small cell lung cancer (s/p chemo and immunotherapy, now on maintenance Atezolizumab as of 2022) , presenting with lightheadedness for 2 days and intermittent diarrhea. Pt hypotensive at urgent care with BP 81/54 in ED, minimal improvement following multiple fluid boluses, with serum cortisol 0.5, consistent with adrenal insufficiency. Patient with positive response and symptomatic improvement on hydrocortisone.     #Adrenal Insufficiency - suspect secondary AI  - Pt with serum cortisol 0.5, diagnostic of adrenal insufficiency, which is a known complication of checkpoint inhibitor immunotherapy. There is not need for cosyntropin testing at this point.  - ACTH low (<2)  - Free T4 0.6  (has been <1 since admission)   - deferred pituitary MRI (pt has claustrophobia and does not have headaches which are usually seen in hypophysitis)  - Hydrocortisone plans/ Discharge plannin mg BID x 2 days ( and ) --> 20 mg AM, 10 mg PM (starting ) until Endocrine follow up.   - Repeat prolactin and macroprolactin in 1-2 weeks outpatient; prolactin 25.7 on , asymptomatic    To help with discharge planning, Please print and give the pt info section for patients under adrenal insufficiency (this will educate the patient regarding the warning signs of adrenal crisis )   -  patient that they should take 2-3x her home dose in cases of illness, fever, accidents, and surgery  - pt should receive stress dosing (hydrocortisone 50mg q8) with any major illness or surgical procedure  - Should pt be unable to tolerate PO and is unable to take hydrocortisone, they will need an emergency injection. Please discharge with a prescription for 100 mg Solu-Cortef Act-O-Vial and the following instructions:  [email or web address]/[email or web address]-[email or web address]-[email or web address]-[email or web address]-solu-[email or web address]-act-[email or web address]-vial-[email or web address]-[email or web address]-[email or web address]  - pt should obtain a medical alert bracelet or necklace to inform emergency providers that they have adrenal insufficiency  [email or web address]  - Patient should also monitor BP closely at home    #Hyperthyroidism   - Patient unsure as to cause of hyperthyroidism, denies any active symptoms. Enlarged thyroid on exam.   - TSH NORMAL 3.07, free thyroxine LOW at 0.8 8/9 (<1 since admission)  - abnormal TFTs could be due to overtreatment of hyperthyroidism (although would suspect TSH to be high), nonthyroidal illness,  vs hypophysitis i/s/o checkpoint inhibitor therapy with low FT4 from central process  - On methimazole 5mg at home; held since patient admitted  - Continue to hold methimazole upon discharge   - TSH receptor Ab negative. Thyroid stimulating immunoglobulin Ab was negative   - Patient will need to repeat free T4  within 1-2 weeks of discharge with outpatient endocrinologist, Dr. Santos Leos MD  Endocrine Fellow  For nonurgent matters, please email [email or web address] or [email or web address]. For urgent follow up questions, discharge recommendations, or new consults please call answering service at [phone] (weekdays), [phone] (nights/weekends).

## 2023-08-12 NOTE — PROGRESS NOTE ADULT - PROBLEM SELECTOR PROBLEM 3
Lung cancer
History of hyperthyroidism
Lung cancer
History of hyperthyroidism
Lung cancer

## 2023-08-12 NOTE — PROGRESS NOTE ADULT - PROBLEM SELECTOR PLAN 4
Hold methimazole  FT4 low 0.7, TSH 3  - Recheck FT4 low 0.6, , TSH 0.86  - f/u TSH receptor Ab, TSI Ab neg  - DC methimazole, outpt f/u endo  - repeat prolactin and macroprolactin in 1-2 weeks as out , prolactin 25.7 on 8/8, asymptomatic

## 2023-08-12 NOTE — PROGRESS NOTE ADULT - SUBJECTIVE AND OBJECTIVE BOX
Dr. Katya Novak  Pager [phone]    PROGRESS NOTE:     Patient is a 67y old  Female who presents with a chief complaint of fever chills diarrhea (11 Aug 2023 14:04)      SUBJECTIVE / OVERNIGHT EVENTS seen by pulm yesterday, repeat CXR clear lungs, breathing better, O2 sat % on RA, after ambulation 93%  ADDITIONAL REVIEW OF SYSTEMS: no chest pain, afebrile, agreeable to go home today,     MEDICATIONS  (STANDING):  albuterol/ipratropium for Nebulization 3 milliLiter(s) Nebulizer every 4 hours  aspirin enteric coated 81 milliGRAM(s) Oral daily  budesonide 160 MICROgram(s)/formoterol 4.5 MICROgram(s) Inhaler 2 Puff(s) Inhalation two times a day  enoxaparin Injectable 40 milliGRAM(s) SubCutaneous every 24 hours  hydrocortisone 20 milliGRAM(s) Oral two times a day  lactated ringers. 1000 milliLiter(s) (75 mL/Hr) IV Continuous <Continuous>  tiotropium 2.5 MICROgram(s) Inhaler 2 Puff(s) Inhalation daily  tobramycin 0.3% Ointment 1 Application(s) Left EYE every 6 hours    MEDICATIONS  (PRN):  albuterol    90 MICROgram(s) HFA Inhaler 2 Puff(s) Inhalation every 6 hours PRN Shortness of Breath      CAPILLARY BLOOD GLUCOSE        I&O's Summary    11 Aug 2023 07:01  -  12 Aug 2023 07:00  --------------------------------------------------------  IN: 200 mL / OUT: 500 mL / NET: -300 mL        PHYSICAL EXAM:  Vital Signs Last 24 Hrs  T(C): 36.8 (12 Aug 2023 04:18), Max: 37.1 (11 Aug 2023 16:35)  T(F): 98.3 (12 Aug 2023 04:18), Max: 98.7 (11 Aug 2023 16:35)  HR: 91 (12 Aug 2023 04:18) (83 - 97)  BP: 139/77 (12 Aug 2023 04:18) (130/70 - 139/78)  BP(mean): --  RR: 17 (12 Aug 2023 04:18) (16 - 19)  SpO2: 98% (12 Aug 2023 04:18) (89% - 100%)    Parameters below as of 12 Aug 2023 04:18  Patient On (Oxygen Delivery Method): room air      CONSTITUTIONAL: NAD, well-developed  RESPIRATORY: clear to auscultation, decreased air entry b/l  CARDIOVASCULAR: Regular rate and rhythm, normal S1 and S2, no murmur/rub/gallop;no lower extremity edema; Peripheral pulses are 2+ bilaterally  ABDOMEN: Nontender to palpation, normoactive bowel sounds, no rebound/guarding; No hepatosplenomegaly  MUSCULOSKELETAL: no clubbing or cyanosis of digits; no joint swelling or tenderness to palpation  PSYCH: A+O to person, place, and time; affect appropriate  LABS:                        11.0   12.58 )-----------( 315      ( 12 Aug 2023 05:32 )             33.2     08-12    143  |  103  |  22  ----------------------------<  78  3.2<L>   |  28  |  1.13    Ca    8.6      12 Aug 2023 05:32  Phos  3.2     08-12  Mg     1.50     08-12            Urinalysis Basic - ( 12 Aug 2023 05:32 )    Color: x / Appearance: x / SG: x / pH: x  Gluc: 78 mg/dL / Ketone: x  / Bili: x / Urobili: x   Blood: x / Protein: x / Nitrite: x   Leuk Esterase: x / RBC: x / WBC x   Sq Epi: x / Non Sq Epi: x / Bacteria: x          RADIOLOGY & ADDITIONAL TESTS:  Results Reviewed:   Imaging Personally Reviewed:  < from: Xray Chest 1 View- PORTABLE-Urgent (Xray Chest 1 View- PORTABLE-Urgent .) (08.11.23 @ 15:47) >  IMPRESSION: No acute finding or change.        Electrocardiogram Personally Reviewed:    COORDINATION OF CARE:  Care Discussed with Consultants/Other Providers [Y/N]: pulm fellow, bronchodilator, likely underlying COPD  Prior or Outpatient Records Reviewed [Y/N]:

## 2023-08-12 NOTE — PROGRESS NOTE ADULT - PROBLEM SELECTOR PLAN 6
Pt with hx of hypertension  -meds: losartan 100  -hold in the setting of hypotension, adrenal insufficiency
Diet: regular  DVT: lovenox   Dispo: home if cleared by endo
Pt with hx of hypertension  -meds: losartan 100  -hold in the setting of hypotension, adrenal insufficiency
Pt with hx of hypertension  -meds: losartan 100  -hold in the setting of hypotension, adrenal insufficiency
Diet: regular  DVT: lovenox   Dispo: pending clinical improvement
Diet: regular  DVT: lovenox   Dispo: pending clinical improvement

## 2023-08-12 NOTE — PROGRESS NOTE ADULT - PROBLEM SELECTOR PLAN 5
pt reports exertional dyspnea, satting 100% on RA at rest, but desat to 89% after ambulation on RA today, home O2 arranged by CM, hx of lung ca and former smoker 1/2 ppd x30 yrs.  Pt denies h/o COPD, never had PFT done before.   yesterday morning pt had scattered wheezing in am, today lungs clear, breathing improved w/ bronchodilator  O2 sat % on RA, after ambulation 93%  doesn't qualify for home O2 per criteria   - seen by pulm yesterday, recs appreciated, repeat CXR no acute infiltrate. Pt does have hyperinflated lungs c/w COPD, also prior CT showed emphysema, so she has COPD.  -d-dimer 163 wnl, low suspicion for PE, no need for CTPA  -discharge home on albuterol inhaler, symbicort and spiriva , outpt f/u pulm

## 2023-08-12 NOTE — PROGRESS NOTE ADULT - REASON FOR ADMISSION
adrenal insufficiency
fever chills diarrhea
shock
hypotension, fever, diarrhea
adrenal insuff and hypoxia
sepsis
AI
shock
s/p surgery

## 2023-08-14 ENCOUNTER — OUTPATIENT (OUTPATIENT)
Dept: OUTPATIENT SERVICES | Facility: HOSPITAL | Age: 68
LOS: 1 days | Discharge: HOME | End: 2023-08-14

## 2023-08-14 DIAGNOSIS — Z90.49 ACQUIRED ABSENCE OF OTHER SPECIFIED PARTS OF DIGESTIVE TRACT: Chronic | ICD-10-CM

## 2023-08-14 DIAGNOSIS — C34.90 MALIGNANT NEOPLASM OF UNSPECIFIED PART OF UNSPECIFIED BRONCHUS OR LUNG: ICD-10-CM

## 2023-08-15 ENCOUNTER — NON-APPOINTMENT (OUTPATIENT)
Age: 68
End: 2023-08-15

## 2023-08-15 ENCOUNTER — APPOINTMENT (OUTPATIENT)
Dept: PULMONOLOGY | Facility: CLINIC | Age: 68
End: 2023-08-15

## 2023-08-15 ENCOUNTER — APPOINTMENT (OUTPATIENT)
Dept: ENDOCRINOLOGY | Facility: CLINIC | Age: 68
End: 2023-08-15
Payer: MEDICARE

## 2023-08-15 VITALS
BODY MASS INDEX: 28.88 KG/M2 | SYSTOLIC BLOOD PRESSURE: 150 MMHG | HEIGHT: 63 IN | HEART RATE: 102 BPM | TEMPERATURE: 98 F | DIASTOLIC BLOOD PRESSURE: 90 MMHG | OXYGEN SATURATION: 87 % | WEIGHT: 163 LBS

## 2023-08-15 PROCEDURE — 99214 OFFICE O/P EST MOD 30 MIN: CPT

## 2023-08-15 RX ORDER — METHIMAZOLE 5 MG/1
5 TABLET ORAL DAILY
Qty: 90 | Refills: 1 | Status: DISCONTINUED | COMMUNITY
Start: 2023-02-10 | End: 2023-08-15

## 2023-08-15 NOTE — HISTORY OF PRESENT ILLNESS
[FreeTextEntry1] : 67 year F, for f/up.   Interval hx remarkable for hospitalization last week for hypotension. Patient currently on atezolizumab for small cell lung cancer.  During hospitalization, patient had measured cortisol of 0.5, and was started on hydrocortisone IV. MRI was not performed to exclude hypophysitis, due to claustrophobia   She does not report headaches currently, however reports fatigue.   Prior or current medication thyroid use: methimazole stopped.  Known family or personal hx of thyroid disease: Yes  Goiter or hx of goiter: No Known Hx of autoimmune disease: No History of Radioactive iodine therapy/ Chest or Neck radiation therapy: No

## 2023-08-15 NOTE — PHYSICAL EXAM
[Alert] : alert [Well Nourished] : well nourished [No Acute Distress] : no acute distress [Well Developed] : well developed [Normal Sclera/Conjunctiva] : normal sclera/conjunctiva [EOMI] : extra ocular movement intact [No Proptosis] : no proptosis [Normal Oropharynx] : the oropharynx was normal [No Respiratory Distress] : no respiratory distress [No Accessory Muscle Use] : no accessory muscle use [Clear to Auscultation] : lungs were clear to auscultation bilaterally [Normal S1, S2] : normal S1 and S2 [Normal Rate] : heart rate was normal [Regular Rhythm] : with a regular rhythm [No Edema] : no peripheral edema [Pedal Pulses Normal] : the pedal pulses are present [Normal Bowel Sounds] : normal bowel sounds [Not Tender] : non-tender [Not Distended] : not distended [Soft] : abdomen soft [Normal Anterior Cervical Nodes] : no anterior cervical lymphadenopathy [No Spinal Tenderness] : no spinal tenderness [Spine Straight] : spine straight [No Stigmata of Cushings Syndrome] : no stigmata of Cushings Syndrome [Normal Gait] : normal gait [Normal Strength/Tone] : muscle strength and tone were normal [No Rash] : no rash [Normal Reflexes] : deep tendon reflexes were 2+ and symmetric [No Tremors] : no tremors [Oriented x3] : oriented to person, place, and time [Acanthosis Nigricans] : no acanthosis nigricans [de-identified] : symmetrically enlarged, non-tender

## 2023-08-22 ENCOUNTER — APPOINTMENT (OUTPATIENT)
Dept: INFUSION THERAPY | Facility: HOSPITAL | Age: 68
End: 2023-08-22

## 2023-08-23 ENCOUNTER — APPOINTMENT (OUTPATIENT)
Dept: HEMATOLOGY ONCOLOGY | Facility: CLINIC | Age: 68
End: 2023-08-23
Payer: MEDICARE

## 2023-08-23 VITALS
HEART RATE: 98 BPM | WEIGHT: 157.19 LBS | TEMPERATURE: 97 F | DIASTOLIC BLOOD PRESSURE: 90 MMHG | SYSTOLIC BLOOD PRESSURE: 152 MMHG | OXYGEN SATURATION: 89 % | BODY MASS INDEX: 27.84 KG/M2 | RESPIRATION RATE: 16 BRPM

## 2023-08-23 DIAGNOSIS — C78.00 SECONDARY MALIGNANT NEOPLASM OF UNSPECIFIED LUNG: ICD-10-CM

## 2023-08-23 DIAGNOSIS — C77.1 SECONDARY AND UNSPECIFIED MALIGNANT NEOPLASM OF INTRATHORACIC LYMPH NODES: ICD-10-CM

## 2023-08-23 PROCEDURE — 99215 OFFICE O/P EST HI 40 MIN: CPT

## 2023-08-23 NOTE — HISTORY OF PRESENT ILLNESS
[Disease: _____________________] : Disease: [unfilled] [T: ___] : T[unfilled] [N: ___] : N[unfilled] [M: ___] : M[unfilled] [AJCC Stage: ____] : AJCC Stage: [unfilled] [de-identified] : 66F with extensive smoking history, who quit in July 2022, noted weight loss of about 25 pounds over the past 7-8 months.  She does report a history of hyperthyroidism and attributed the weight loss to that at first.  In late May 2022, her family noted neck swelling.  She saw her PCP and was sent for a thyroid ultrasound which was unremarkable per the patient.  Due to the weight loss, she was sent for a CXR that was abnormal.  This led to a CT chest performed in early July 2022 revealing a large right hilar/mediastinal mass measuring up to 7 cm with bilateral pulmonary nodules.  She was referred to thoracic surgery.  She underwent EBUS/bronchoscopy in late July 2022 with biopsy of the RUL and pretracheal lymph node revealing small cell carcinoma.  Initial staging CT revealed an 11cm partially calcified left adnexal mass felt to represent an exophytic fibroid.  She was felt to have extensive stage disease based on the lung nodules felt to represent metastases.  Began first line Carbo/Etoposide/Atezolizumab in August 2022; achieved IA.  Completed  4 cycles of chemo + immunotherapy in Oct 2022 followed by maintenance Atezolizumab as of Nov 2022.  She saw radiation oncology for evaluation of consolidative thoracic RT which was deferred due to her dramatic response.  Patient also preferred to undergo surveillance brain MRIs rather than receive PCI.  Treated through July 2023 with subsequent hospitalization in August 2023 for AI at which point Atezolizumab was discontinued.    [de-identified] : - Lung, RUL biopsy and pretracheal EBUS guided FNA 7/29/2022: Positive for malignant cells.  Small cell carcinoma. [de-identified] : Completed  4 cycles of chemo + immunotherapy in Oct 2022 followed by maintenance Atezolizumab as of Nov 2022.   Patient is status post treatment with atezolizumab maintenance on 7/25/2023. Patient is status post hospitalization 8/7 - 8/12/2023.  Prior to hospitalization, she went to urgent care with complaints of flulike symptoms including feeling lightheaded with body aches, cough and chills.  She was referred to the ER given hypotension at urgent care.  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  Infectious sepsis was ruled out and initial treatment with antibiotics was discontinued by ID.  There is mention in the chart of possible nephritis and colitis from immunotherapy as well, however SIRI during hospitalization appeared to respond to IVF hydration.  Patient was also found to be hypothyroid and her methimazole was held and she was started on levothyroxine replacement.  Patient was managed by pulmonary for hypoxia with findings consistent with COPD.  She did not qualify for home O2.  She has upcoming follow-up with pulmonary.  She is feeling improved status post hospital discharge.  She had follow-up with endocrinology.  She continues on hormone replacement therapy.

## 2023-08-23 NOTE — ASSESSMENT
[FreeTextEntry1] : Extensive stage small cell lung cancer.  Began first line chemo + immunotherapy with Carboplatin/Etoposide/Atezolizumab in August 2022; achieved SC.  Completed  4 cycles of chemo + immunotherapy in Oct 2022 followed by maintenance Atezolizumab from Nov 2022. Restaging PET/CT June 2023 with overall sustained systemic response.  Treated with Atezolizumab through July 2023.  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   Recommend: - Discontinue atezolizumab given development of suspected autoimmune toxicity, namely adrenal insufficiency. - Continue follow-up with endocrinology.  On hormone replacement therapy.  She has thyroid dysfunction, previously hyperthyroid that was managed by her PCP and now being treated for hypothyroidism. - Inpatient documentation mentions possible nephritis, however there is not clear evidence of this.  I suspect SIRI was secondary to hypotension and dehydration which responded to medical management.  There is also discussion of possible colitis however there is not appear to be clear evidence of that either.  She did have mild dermatitis while on atezolizumab possibly related to immunotherapy that responded to topical steroid. - Patient saw a radiation oncology in consultation in the past who deferred administration of thoracic RT given her dramatic response.  Continue brain MRI surveillance as per radiation oncology. - Follow-up with endocrinology for evaluation and work-up of possible pituitary adenoma.  There are findings concerning for possible pituitary adenoma noted on surveillance outside Brain MRI from June 2023.   It is not clear if patient has hypophysitis. -She appears to have a left adnexal fibroid mass unrelated to her cancer diagnosis - Discussed monitoring the patient off of systemic therapy until the time of disease progression given her dramatic response to systemic therapy which included immunotherapy.  Patient had an overall sustained response on her most recent restaging scan from June 2023.  Recommend we obtain a restaging scan in September 2023 and that she follow-up to review results.  If she develops disease progression in the future, could consider retreatment with platinum doublet chemotherapy given her nice initial response and length of time since she has been exposed to these agents and is therefore suspected to be platinum sensitive.  Could consider the role of retreatment with immunotherapy if her only significant autoimmune toxicity is adrenal insufficiency that is otherwise being effectively medically managed. - To see pulmonary for management of suspected undiagnosed COPD - Follow-up in September to review the results of her restaging/surveillance CT scan or sooner should problems arise. All questions answered to her apparent satisfaction.  Note provided to the patient to excuse her daughter from work who provided her with transportation today.

## 2023-08-23 NOTE — RESULTS/DATA
[FreeTextEntry1] : - CT chest 7/7/2022: Large mediastinal mass measuring up to 7.0 cm extending into the right hilum and RUL parenchyma.  Differential considerations include a primary lung malignancy with mediastinal invasion versus primary mediastinal mass with extension into the lung parenchyma.  Mass narrows and encases the lobar right superior pulmonary artery and segmental branches and SVC.  There is abutment of the aortic arch branches by the mass.  Multiple bilateral pulmonary nodules measuring up to 1.4 cm.   -CT A/P 8/16: no evidence of metastatic disease in the abdomen or pelvis  -CT C/A/P 9/26/22:  Comparison is made to outside CT chest July 7, 2022:   Favorable response to treatment as evidenced by significant interval decrease in size of right upper lobe pulmonary nodules, and interval decrease in size of multiple mediastinal lymph nodes. Stable 11.3 cm partially-calcified left adnexal mass. Pelvic MRI can be considered to exclude left adnexal pathology if clinically warranted.  - MRI brain 10/31/2022: Negative for metastatic disease.  Probable pituitary microadenoma on the right.  -CT C/A/P 1/2/23:  Since 9/26/2022:  Further decrease in size of right upper lobe nodules, representing known lung cancer.  New ill-defined right lower lobe nodule and slightly larger nodules abutting the left oblique fissure, of uncertain significance. 3 month follow-up is recommended.  Unchanged left adnexal mass.  -CT C/A/P 3/31/23:  Few bilateral subcentimeter pulmonary nodules are unchanged since January 2, 2023.  Unchanged left adnexal mass.  - PET/CT 6/15/23:   1. Indeterminate FDG avid lesion in the region of the sella. The differential diagnosis includes hypophysitis, metastasis, and pituitary macroadenoma. Recommend laboratory and MRI evaluation. 2. No other FDG avid findings that would suggest residual/recurrent disease.  - MRI brain 6/19/2023: No evidence of intracranial metastatic disease.  Minimal small vessel ischemic disease.  Pituitary gland lesion probably representing an adenoma.  - Labs from hospitalization August 2023: WBC 12.5 hemoglobin 11.0 MCV 90 platelets 315K ANC 6.9.  Serum creatinine 1.1.  Free T4 low.

## 2023-08-23 NOTE — PHYSICAL EXAM
[Fully active, able to carry on all pre-disease performance without restriction] : Status 0 - Fully active, able to carry on all pre-disease performance without restriction [Normal] : affect appropriate [de-identified] : scattered skin darkening [Restricted in physically strenuous activity but ambulatory and able to carry out work of a light or sedentary nature] : Status 1- Restricted in physically strenuous activity but ambulatory and able to carry out work of a light or sedentary nature, e.g., light house work, office work [de-identified] : No icterus [de-identified] : MMM O/P Clear [de-identified] : supple No LAD [de-identified] : Somewhat distant BS  [de-identified] : S1 S2 [de-identified] : No edema [de-identified] : No spine/CVA tenderness

## 2023-08-28 ENCOUNTER — APPOINTMENT (OUTPATIENT)
Dept: PULMONOLOGY | Facility: CLINIC | Age: 68
End: 2023-08-28
Payer: MEDICARE

## 2023-08-28 VITALS
HEIGHT: 63.39 IN | OXYGEN SATURATION: 96 % | SYSTOLIC BLOOD PRESSURE: 127 MMHG | WEIGHT: 154 LBS | DIASTOLIC BLOOD PRESSURE: 85 MMHG | HEART RATE: 101 BPM | BODY MASS INDEX: 26.95 KG/M2

## 2023-08-28 DIAGNOSIS — C34.90 MALIGNANT NEOPLASM OF UNSPECIFIED PART OF UNSPECIFIED BRONCHUS OR LUNG: ICD-10-CM

## 2023-08-28 PROCEDURE — 94729 DIFFUSING CAPACITY: CPT

## 2023-08-28 PROCEDURE — 94010 BREATHING CAPACITY TEST: CPT

## 2023-08-28 PROCEDURE — ZZZZZ: CPT

## 2023-08-28 PROCEDURE — 99203 OFFICE O/P NEW LOW 30 MIN: CPT | Mod: 25

## 2023-08-28 NOTE — HISTORY OF PRESENT ILLNESS
[TextBox_4] : 67F former smoker (quit July 2022), hyperthyroid, with stage IV small cell lung cancer.  At initial diagnosis she experienced a 25lb weight loss over 8 months, subsequent testing revealed large right hilar/mediastinal mass up to 7cm with bilateral pulm nodules on CT 7/2022.  She is s/p EBUS/bronch 7/2022 bx revealed small cell carcinoma. She was found to have 11cm left adnexal mass likely fibroid.  She began chemo and immunotherapy. PET/CT  6/2023 shows sustained systemic response.   She is s/p admission 8/7-8/12/23 due to shock, adrenal insufficiency and low cortisol. She was discharged on hydrocortisone 20mg AM and 10mg PM.   reports shortness of breath after climbing up 1 flight of stairs. denies shortness of breath walking on flat ground or performing ADLs.  Has post nasal drip, doesnt like using nasal sprays.  Has O2 at home.

## 2023-08-28 NOTE — ASSESSMENT
[FreeTextEntry1] : 67F former smoker (quit July 2022), hyperthyroid, with stage IV small cell lung cancer. She has been responsive to chemo+immunotherapy. Next CT in September. She denies respiratory complaints and denies difficulty with ADLs.  SpO2 at rest 94%. She walked about 320ft and dropped to 88%. Lungs are clear. Poor effort on PFTs .Presumably severe COPD Now hypothyroid and hypoadrenal monitor O2 saturation on exertion. She has supplemented O2 at home. May need to use it if saturation drops below 88%. Call us if this happens.

## 2023-08-28 NOTE — END OF VISIT
[FreeTextEntry3] : I reviewed and agree with the information elicited by the advanced care practitioner and I personally elicited a history and examined the patient and developed a plan of care

## 2023-08-28 NOTE — PHYSICAL EXAM
[No Acute Distress] : no acute distress [Well Nourished] : well nourished [Well Developed] : well developed [Normal Rate/Rhythm] : normal rate/rhythm [Normal S1, S2] : normal s1, s2 [No Resp Distress] : no resp distress [Clear to Auscultation Bilaterally] : clear to auscultation bilaterally [Normal Affect] : normal affect

## 2023-09-19 ENCOUNTER — APPOINTMENT (OUTPATIENT)
Dept: INFUSION THERAPY | Facility: HOSPITAL | Age: 68
End: 2023-09-19

## 2023-09-19 ENCOUNTER — APPOINTMENT (OUTPATIENT)
Dept: HEMATOLOGY ONCOLOGY | Facility: CLINIC | Age: 68
End: 2023-09-19

## 2023-09-20 ENCOUNTER — APPOINTMENT (OUTPATIENT)
Dept: RADIATION ONCOLOGY | Facility: CLINIC | Age: 68
End: 2023-09-20
Payer: MEDICARE

## 2023-09-20 PROCEDURE — 99442: CPT | Mod: GC,95

## 2023-09-23 ENCOUNTER — APPOINTMENT (OUTPATIENT)
Dept: NUCLEAR MEDICINE | Facility: IMAGING CENTER | Age: 68
End: 2023-09-23
Payer: MEDICARE

## 2023-09-23 ENCOUNTER — OUTPATIENT (OUTPATIENT)
Dept: OUTPATIENT SERVICES | Facility: HOSPITAL | Age: 68
LOS: 1 days | End: 2023-09-23
Payer: MEDICARE

## 2023-09-23 DIAGNOSIS — Z90.49 ACQUIRED ABSENCE OF OTHER SPECIFIED PARTS OF DIGESTIVE TRACT: Chronic | ICD-10-CM

## 2023-09-23 DIAGNOSIS — C34.01 MALIGNANT NEOPLASM OF RIGHT MAIN BRONCHUS: ICD-10-CM

## 2023-09-23 PROCEDURE — A9552: CPT

## 2023-09-23 PROCEDURE — 78815 PET IMAGE W/CT SKULL-THIGH: CPT

## 2023-09-23 PROCEDURE — 78815 PET IMAGE W/CT SKULL-THIGH: CPT | Mod: 26,PS,MH

## 2023-09-27 ENCOUNTER — APPOINTMENT (OUTPATIENT)
Dept: HEMATOLOGY ONCOLOGY | Facility: CLINIC | Age: 68
End: 2023-09-27
Payer: MEDICARE

## 2023-09-27 ENCOUNTER — NON-APPOINTMENT (OUTPATIENT)
Age: 68
End: 2023-09-27

## 2023-09-27 VITALS
TEMPERATURE: 97.3 F | WEIGHT: 155.86 LBS | BODY MASS INDEX: 27.27 KG/M2 | RESPIRATION RATE: 16 BRPM | HEART RATE: 101 BPM | OXYGEN SATURATION: 97 % | SYSTOLIC BLOOD PRESSURE: 133 MMHG | DIASTOLIC BLOOD PRESSURE: 86 MMHG

## 2023-09-27 DIAGNOSIS — C34.01 MALIGNANT NEOPLASM OF RIGHT MAIN BRONCHUS: ICD-10-CM

## 2023-09-27 PROCEDURE — 99214 OFFICE O/P EST MOD 30 MIN: CPT

## 2023-09-29 PROBLEM — C34.01 MALIGNANT NEOPLASM OF HILUS OF RIGHT LUNG: Status: ACTIVE | Noted: 2022-08-12

## 2023-10-10 ENCOUNTER — APPOINTMENT (OUTPATIENT)
Dept: RADIATION ONCOLOGY | Facility: CLINIC | Age: 68
End: 2023-10-10
Payer: MEDICARE

## 2023-10-10 VITALS
RESPIRATION RATE: 18 BRPM | SYSTOLIC BLOOD PRESSURE: 134 MMHG | TEMPERATURE: 96.6 F | DIASTOLIC BLOOD PRESSURE: 86 MMHG | OXYGEN SATURATION: 97 % | HEART RATE: 90 BPM | HEIGHT: 63 IN | WEIGHT: 154.32 LBS | BODY MASS INDEX: 27.34 KG/M2

## 2023-10-10 PROCEDURE — 99214 OFFICE O/P EST MOD 30 MIN: CPT | Mod: 25

## 2023-10-16 ENCOUNTER — OUTPATIENT (OUTPATIENT)
Dept: OUTPATIENT SERVICES | Facility: HOSPITAL | Age: 68
LOS: 1 days | Discharge: HOME | End: 2023-10-16

## 2023-10-16 DIAGNOSIS — Z90.49 ACQUIRED ABSENCE OF OTHER SPECIFIED PARTS OF DIGESTIVE TRACT: Chronic | ICD-10-CM

## 2023-10-16 DIAGNOSIS — C79.31 SECONDARY MALIGNANT NEOPLASM OF BRAIN: ICD-10-CM

## 2023-10-16 DIAGNOSIS — C79.49 SECONDARY MALIGNANT NEOPLASM OF BRAIN: ICD-10-CM

## 2023-10-17 ENCOUNTER — APPOINTMENT (OUTPATIENT)
Dept: HEMATOLOGY ONCOLOGY | Facility: CLINIC | Age: 68
End: 2023-10-17

## 2023-10-17 ENCOUNTER — APPOINTMENT (OUTPATIENT)
Dept: INFUSION THERAPY | Facility: HOSPITAL | Age: 68
End: 2023-10-17

## 2023-10-18 ENCOUNTER — APPOINTMENT (OUTPATIENT)
Dept: ENDOCRINOLOGY | Facility: CLINIC | Age: 68
End: 2023-10-18
Payer: MEDICARE

## 2023-10-18 VITALS
HEIGHT: 63 IN | TEMPERATURE: 98.5 F | BODY MASS INDEX: 27.29 KG/M2 | HEART RATE: 105 BPM | SYSTOLIC BLOOD PRESSURE: 110 MMHG | OXYGEN SATURATION: 95 % | WEIGHT: 154 LBS | DIASTOLIC BLOOD PRESSURE: 70 MMHG

## 2023-10-18 LAB
CORTIS SERPL-MCNC: 4.3 UG/DL
IGF-1 INTERP: NORMAL
IGF-I BLD-MCNC: 168 NG/ML
PROLACTIN SERPL-MCNC: 45.8 NG/ML
T4 FREE SERPL-MCNC: 2 NG/DL
TSH SERPL-ACNC: <0.01 UIU/ML

## 2023-10-18 PROCEDURE — 99214 OFFICE O/P EST MOD 30 MIN: CPT

## 2023-10-18 RX ORDER — LEVOTHYROXINE SODIUM 0.05 MG/1
50 TABLET ORAL DAILY
Qty: 30 | Refills: 0 | Status: DISCONTINUED | COMMUNITY
Start: 2023-08-15 | End: 2023-10-18

## 2023-10-19 LAB — ACTH-ESO: <5 PG/ML

## 2023-10-24 ENCOUNTER — NON-APPOINTMENT (OUTPATIENT)
Age: 68
End: 2023-10-24

## 2023-11-10 ENCOUNTER — APPOINTMENT (OUTPATIENT)
Dept: MRI IMAGING | Facility: CLINIC | Age: 68
End: 2023-11-10

## 2023-11-10 ENCOUNTER — OUTPATIENT (OUTPATIENT)
Dept: OUTPATIENT SERVICES | Facility: HOSPITAL | Age: 68
LOS: 1 days | End: 2023-11-10

## 2023-11-10 ENCOUNTER — OUTPATIENT (OUTPATIENT)
Dept: OUTPATIENT SERVICES | Facility: HOSPITAL | Age: 68
LOS: 1 days | Discharge: HOME | End: 2023-11-10

## 2023-11-10 DIAGNOSIS — C79.31 SECONDARY MALIGNANT NEOPLASM OF BRAIN: ICD-10-CM

## 2023-11-10 DIAGNOSIS — Z90.49 ACQUIRED ABSENCE OF OTHER SPECIFIED PARTS OF DIGESTIVE TRACT: Chronic | ICD-10-CM

## 2023-11-10 DIAGNOSIS — C34.90 MALIGNANT NEOPLASM OF UNSPECIFIED PART OF UNSPECIFIED BRONCHUS OR LUNG: ICD-10-CM

## 2023-11-12 ENCOUNTER — NON-APPOINTMENT (OUTPATIENT)
Age: 68
End: 2023-11-12

## 2023-11-15 ENCOUNTER — APPOINTMENT (OUTPATIENT)
Dept: NEUROSURGERY | Facility: CLINIC | Age: 68
End: 2023-11-15

## 2023-11-22 ENCOUNTER — APPOINTMENT (OUTPATIENT)
Dept: HEMATOLOGY ONCOLOGY | Facility: CLINIC | Age: 68
End: 2023-11-22

## 2023-11-22 ENCOUNTER — NON-APPOINTMENT (OUTPATIENT)
Age: 68
End: 2023-11-22

## 2023-11-29 ENCOUNTER — APPOINTMENT (OUTPATIENT)
Dept: ENDOCRINOLOGY | Facility: CLINIC | Age: 68
End: 2023-11-29
Payer: MEDICARE

## 2023-11-29 VITALS
HEART RATE: 110 BPM | SYSTOLIC BLOOD PRESSURE: 124 MMHG | TEMPERATURE: 98.6 F | BODY MASS INDEX: 28.03 KG/M2 | WEIGHT: 158.19 LBS | OXYGEN SATURATION: 94 % | HEIGHT: 63 IN | DIASTOLIC BLOOD PRESSURE: 72 MMHG

## 2023-11-29 PROCEDURE — 99214 OFFICE O/P EST MOD 30 MIN: CPT

## 2023-12-01 LAB
CORTIS SERPL-MCNC: 21.3 UG/DL
IGF-1 INTERP: NORMAL
IGF-I BLD-MCNC: 178 NG/ML
PROLACTIN SERPL-MCNC: 0.4 NG/ML
PROLACTIN SERPL-MCNC: 0.4 NG/ML
T4 FREE SERPL-MCNC: 1.7 NG/DL
T4 FREE SERPL-MCNC: 1.7 NG/DL
THYROPEROXIDASE AB SERPL IA-ACNC: 11.8 IU/ML
TSH SERPL-ACNC: <0.01 UIU/ML
TSH SERPL-ACNC: <0.01 UIU/ML
TSI ACT/NOR SER: <0.1 IU/L

## 2023-12-04 LAB — ACTH-ESO: 6 PG/ML

## 2023-12-05 ENCOUNTER — NON-APPOINTMENT (OUTPATIENT)
Age: 68
End: 2023-12-05

## 2023-12-05 LAB — DHEA-SULFATE, SERUM: <10 UG/DL

## 2023-12-19 ENCOUNTER — APPOINTMENT (OUTPATIENT)
Dept: HEMATOLOGY ONCOLOGY | Facility: CLINIC | Age: 68
End: 2023-12-19

## 2023-12-20 ENCOUNTER — APPOINTMENT (OUTPATIENT)
Dept: NEUROSURGERY | Facility: CLINIC | Age: 68
End: 2023-12-20

## 2024-01-30 ENCOUNTER — APPOINTMENT (OUTPATIENT)
Dept: ENDOCRINOLOGY | Facility: CLINIC | Age: 69
End: 2024-01-30
Payer: MEDICARE

## 2024-01-30 VITALS
OXYGEN SATURATION: 93 % | BODY MASS INDEX: 27.03 KG/M2 | TEMPERATURE: 98.1 F | DIASTOLIC BLOOD PRESSURE: 70 MMHG | HEIGHT: 63 IN | WEIGHT: 152.56 LBS | SYSTOLIC BLOOD PRESSURE: 110 MMHG | HEART RATE: 109 BPM

## 2024-01-30 LAB
CORTIS SERPL-MCNC: 0.4 UG/DL
PROLACTIN SERPL-MCNC: 0.4 NG/ML
T4 FREE SERPL-MCNC: 1.6 NG/DL
TSH SERPL-ACNC: <0.01 UIU/ML

## 2024-01-30 PROCEDURE — 99214 OFFICE O/P EST MOD 30 MIN: CPT

## 2024-01-30 NOTE — HISTORY OF PRESENT ILLNESS
[FreeTextEntry1] : 68 year F, for f/up for adrenal insufficiency, pituitary adenoma, thyroid dysfunction.   Summary:  Interval hx remarkable for hospitalization in 8/2023  for hypotension. Patient was on atezolizumab for small cell lung cancer.  During hospitalization, patient had measured cortisol of 0.5, and was started on hydrocortisone IV. And had been sent out on hydrocortisone 20/10mg. Dosage was decreased to 10/5mg   MRI was not performed to exclude hypophysitis, due to claustrophobia   Thyroid:  Prior or current medication thyroid use: methimazole stopped, there was concern for symptomatic hypothyroidism, and she was started on LT4 replacement. Most recently off replacement Known family or personal hx of thyroid disease: Yes  Goiter or hx of goiter: No Known Hx of autoimmune disease: No History of Radioactive iodine therapy/ Chest or Neck radiation therapy: No   Last MRI brain/pituitary:  9/11/2023, 9 mm on right sided of pituitary gland. Noted prior. Size of lesion unchanged.   Headaches: No  Vision problems: No new   Adrenal insufficiency:  No dizziness, no n/v.   Currently on hydrocortisone 5mg Q am and 5mg Q pm

## 2024-01-30 NOTE — PHYSICAL EXAM
[Alert] : alert [Well Nourished] : well nourished [No Acute Distress] : no acute distress [Well Developed] : well developed [Normal Sclera/Conjunctiva] : normal sclera/conjunctiva [EOMI] : extra ocular movement intact [No Proptosis] : no proptosis [Normal Oropharynx] : the oropharynx was normal [No Respiratory Distress] : no respiratory distress [No Accessory Muscle Use] : no accessory muscle use [Clear to Auscultation] : lungs were clear to auscultation bilaterally [Normal S1, S2] : normal S1 and S2 [Normal Rate] : heart rate was normal [Regular Rhythm] : with a regular rhythm [No Edema] : no peripheral edema [Pedal Pulses Normal] : the pedal pulses are present [Normal Bowel Sounds] : normal bowel sounds [Not Tender] : non-tender [Not Distended] : not distended [Soft] : abdomen soft [Normal Anterior Cervical Nodes] : no anterior cervical lymphadenopathy [No Spinal Tenderness] : no spinal tenderness [Spine Straight] : spine straight [No Stigmata of Cushings Syndrome] : no stigmata of Cushings Syndrome [Normal Gait] : normal gait [Normal Strength/Tone] : muscle strength and tone were normal [No Rash] : no rash [Acanthosis Nigricans] : no acanthosis nigricans [Normal Reflexes] : deep tendon reflexes were 2+ and symmetric [No Tremors] : no tremors [Oriented x3] : oriented to person, place, and time [de-identified] : symmetrically enlarged, non-tender thyroid

## 2024-02-01 LAB — ACTH-ESO: <5 PG/ML

## 2024-04-16 ENCOUNTER — APPOINTMENT (OUTPATIENT)
Dept: ENDOCRINOLOGY | Facility: CLINIC | Age: 69
End: 2024-04-16
Payer: MEDICARE

## 2024-04-16 DIAGNOSIS — E05.90 THYROTOXICOSIS, UNSPECIFIED W/OUT THYROTOXIC CRISIS OR STORM: ICD-10-CM

## 2024-04-16 PROCEDURE — 99214 OFFICE O/P EST MOD 30 MIN: CPT

## 2024-04-16 NOTE — HISTORY OF PRESENT ILLNESS
[FreeTextEntry1] : 68 year F, for f/up for adrenal insufficiency, pituitary adenoma, thyroid dysfunction.   Summary:  Interval hx remarkable for hospitalization in 8/2023  for hypotension. Patient was on atezolizumab for small cell lung cancer.  During hospitalization, patient had measured cortisol of 0.5, and was started on hydrocortisone IV. And had been sent out on hydrocortisone 20/10mg. Dosage was decreased to 10/5mg and most recently 5mg/5mg   MRI was not performed to exclude hypophysitis, due to claustrophobia   Thyroid:  Prior or current medication thyroid use: methimazole stopped, there was concern for symptomatic hypothyroidism, and she was started on LT4 replacement. Most recently off replacement Known family or personal hx of thyroid disease: Yes  Goiter or hx of goiter: No Known Hx of autoimmune disease: No History of Radioactive iodine therapy/ Chest or Neck radiation therapy: No   Last MRI brain/pituitary:  9/11/2023, 9 mm on right sided of pituitary gland. Noted prior. Size of lesion unchanged.   Headaches: No  Vision problems: No new   Adrenal insufficiency:  No dizziness, no n/v.   Currently on hydrocortisone 5mg Q am and 5mg Q pm

## 2024-04-16 NOTE — PHYSICAL EXAM
[Alert] : alert [Well Nourished] : well nourished [No Acute Distress] : no acute distress [Well Developed] : well developed [Normal Sclera/Conjunctiva] : normal sclera/conjunctiva [EOMI] : extra ocular movement intact [No Proptosis] : no proptosis [Normal Oropharynx] : the oropharynx was normal [No Respiratory Distress] : no respiratory distress [No Accessory Muscle Use] : no accessory muscle use [Clear to Auscultation] : lungs were clear to auscultation bilaterally [Normal S1, S2] : normal S1 and S2 [Normal Rate] : heart rate was normal [Regular Rhythm] : with a regular rhythm [No Edema] : no peripheral edema [Pedal Pulses Normal] : the pedal pulses are present [Normal Bowel Sounds] : normal bowel sounds [Not Tender] : non-tender [Not Distended] : not distended [Soft] : abdomen soft [Normal Anterior Cervical Nodes] : no anterior cervical lymphadenopathy [No Spinal Tenderness] : no spinal tenderness [Spine Straight] : spine straight [No Stigmata of Cushings Syndrome] : no stigmata of Cushings Syndrome [Normal Gait] : normal gait [Normal Strength/Tone] : muscle strength and tone were normal [No Rash] : no rash [Acanthosis Nigricans] : no acanthosis nigricans [Normal Reflexes] : deep tendon reflexes were 2+ and symmetric [No Tremors] : no tremors [Oriented x3] : oriented to person, place, and time [de-identified] : symmetrically enlarged, non-tender thyroid

## 2024-06-12 ENCOUNTER — APPOINTMENT (OUTPATIENT)
Dept: ENDOCRINOLOGY | Facility: CLINIC | Age: 69
End: 2024-06-12
Payer: MEDICARE

## 2024-06-12 VITALS
BODY MASS INDEX: 25.87 KG/M2 | WEIGHT: 146 LBS | DIASTOLIC BLOOD PRESSURE: 70 MMHG | OXYGEN SATURATION: 93 % | SYSTOLIC BLOOD PRESSURE: 116 MMHG | HEIGHT: 63 IN | HEART RATE: 96 BPM

## 2024-06-12 DIAGNOSIS — E03.9 HYPOTHYROIDISM, UNSPECIFIED: ICD-10-CM

## 2024-06-12 DIAGNOSIS — E22.1 HYPERPROLACTINEMIA: ICD-10-CM

## 2024-06-12 DIAGNOSIS — E27.40 UNSPECIFIED ADRENOCORTICAL INSUFFICIENCY: ICD-10-CM

## 2024-06-12 DIAGNOSIS — D35.2 BENIGN NEOPLASM OF PITUITARY GLAND: ICD-10-CM

## 2024-06-12 LAB
CORTIS SERPL-MCNC: 0.6 UG/DL
T4 FREE SERPL-MCNC: 1.5 NG/DL
TSH SERPL-ACNC: 0.01 UIU/ML

## 2024-06-12 PROCEDURE — 99214 OFFICE O/P EST MOD 30 MIN: CPT

## 2024-06-12 RX ORDER — HYDROCORTISONE 5 MG/1
5 TABLET ORAL
Qty: 180 | Refills: 1 | Status: ACTIVE | COMMUNITY
Start: 2023-08-15 | End: 1900-01-01

## 2024-06-12 RX ORDER — CABERGOLINE 0.5 MG/1
0.5 TABLET ORAL
Qty: 6 | Refills: 1 | Status: ACTIVE | COMMUNITY
Start: 2023-10-18 | End: 1900-01-01

## 2024-06-12 NOTE — HISTORY OF PRESENT ILLNESS
[FreeTextEntry1] : 68 year F, for f/up for adrenal insufficiency, pituitary adenoma, thyroid dysfunction likely related to immune therapy  Summary:  Interval hx remarkable for hospitalization in 8/2023  for hypotension. Patient was on atezolizumab for small cell lung cancer.  During hospitalization, patient had measured cortisol of 0.5, and was started on hydrocortisone IV. And had been sent out on hydrocortisone 20/10mg. Dosage was decreased to 10/5mg and most recently 5mg/5mg   MRI was not performed at that time to exclude hypophysitis, due to claustrophobia   MRI 9/2023 suggested a 9mm pituitary lesion. She has interval MRI head done with oncology for monitoring of disease progression with respect to LSCC.    HPA axis so far appears to have not recovered on cortisol checks with pausing hydrocortisone.    Thyroid:  Prior or current medication thyroid use: methimazole stopped, there was concern for symptomatic hypothyroidism, and she was started on LT4 replacement which appears to have resulted on iatrogenic hyperthyroidism. Most recently off replacement Known family or personal hx of thyroid disease: Yes  Goiter or hx of goiter: No Known Hx of autoimmune disease: No History of Radioactive iodine therapy/ Chest or Neck radiation therapy: No   Last MRI brain/pituitary:  9/11/2023, 9 mm on right sided of pituitary gland. Noted prior. Size of lesion unchanged.   Headaches: No  Vision problems: No new   Adrenal insufficiency:  No dizziness, no n/v.   Currently on hydrocortisone 5mg Q am and 5mg Q pm

## 2024-06-17 LAB — ACTH-ESO: <5 PG/ML

## 2024-09-11 ENCOUNTER — APPOINTMENT (OUTPATIENT)
Dept: ENDOCRINOLOGY | Facility: CLINIC | Age: 69
End: 2024-09-11
Payer: MEDICARE

## 2024-09-11 VITALS
TEMPERATURE: 97 F | HEART RATE: 94 BPM | WEIGHT: 161 LBS | HEIGHT: 63 IN | OXYGEN SATURATION: 100 % | BODY MASS INDEX: 28.53 KG/M2 | RESPIRATION RATE: 18 BRPM | SYSTOLIC BLOOD PRESSURE: 130 MMHG | DIASTOLIC BLOOD PRESSURE: 70 MMHG

## 2024-09-11 DIAGNOSIS — E03.9 HYPOTHYROIDISM, UNSPECIFIED: ICD-10-CM

## 2024-09-11 DIAGNOSIS — D35.2 BENIGN NEOPLASM OF PITUITARY GLAND: ICD-10-CM

## 2024-09-11 DIAGNOSIS — E27.40 UNSPECIFIED ADRENOCORTICAL INSUFFICIENCY: ICD-10-CM

## 2024-09-11 DIAGNOSIS — Z86.39 PERSONAL HISTORY OF OTHER ENDOCRINE, NUTRITIONAL AND METABOLIC DISEASE: ICD-10-CM

## 2024-09-11 DIAGNOSIS — E22.1 HYPERPROLACTINEMIA: ICD-10-CM

## 2024-09-11 PROCEDURE — 99214 OFFICE O/P EST MOD 30 MIN: CPT

## 2024-09-11 NOTE — HISTORY OF PRESENT ILLNESS
[FreeTextEntry1] :  f/up for adrenal insufficiency, pituitary adenoma, thyroid dysfunction likely related to immune therapy  Summary:  Interval hx remarkable for hospitalization in 8/2023  for hypotension. Patient was on atezolizumab for small cell lung cancer.  During hospitalization, patient had measured cortisol of 0.5, and was started on hydrocortisone IV. And had been sent out on hydrocortisone 20/10mg. Dosage was decreased to 10/5mg and most recently 5mg/5mg   MRI was not performed at that time to exclude hypophysitis, due to claustrophobia   MRI 9/2023 suggested a 9mm pituitary lesion. She has interval MRI head done with oncology for monitoring of disease progression with respect to LSCC.    HPA axis so far appears to have not recovered on cortisol checks with pausing hydrocortisone.    Thyroid:  Prior or current medication thyroid use: methimazole stopped, there was concern for symptomatic hypothyroidism, and she was started on LT4 replacement which appears to have resulted on iatrogenic hyperthyroidism. Most recently off replacement Known family or personal hx of thyroid disease: Yes  Goiter or hx of goiter: No Known Hx of autoimmune disease: No History of Radioactive iodine therapy/ Chest or Neck radiation therapy: No   Last MRI brain/pituitary:  9/11/2023, 9 mm on right sided of pituitary gland. Noted prior. Size of lesion unchanged.   Headaches: No  Vision problems: No new   Adrenal insufficiency:  No dizziness, no n/v.   Currently on hydrocortisone 5mg Q am and 5mg Q pm

## 2024-09-11 NOTE — PHYSICAL EXAM
[Alert] : alert [Well Nourished] : well nourished [No Acute Distress] : no acute distress [Well Developed] : well developed [Normal Sclera/Conjunctiva] : normal sclera/conjunctiva [EOMI] : extra ocular movement intact [No Proptosis] : no proptosis [Normal Oropharynx] : the oropharynx was normal [No Respiratory Distress] : no respiratory distress [No Accessory Muscle Use] : no accessory muscle use [Clear to Auscultation] : lungs were clear to auscultation bilaterally [Normal S1, S2] : normal S1 and S2 [Normal Rate] : heart rate was normal [Regular Rhythm] : with a regular rhythm [No Edema] : no peripheral edema [Pedal Pulses Normal] : the pedal pulses are present [Normal Bowel Sounds] : normal bowel sounds [Not Tender] : non-tender [Not Distended] : not distended [Soft] : abdomen soft [Normal Anterior Cervical Nodes] : no anterior cervical lymphadenopathy [No Spinal Tenderness] : no spinal tenderness [Spine Straight] : spine straight [No Stigmata of Cushings Syndrome] : no stigmata of Cushings Syndrome [Normal Gait] : normal gait [Normal Strength/Tone] : muscle strength and tone were normal [No Rash] : no rash [Normal Reflexes] : deep tendon reflexes were 2+ and symmetric [No Tremors] : no tremors [Oriented x3] : oriented to person, place, and time [Acanthosis Nigricans] : no acanthosis nigricans [de-identified] : symmetrically enlarged, non-tender thyroid

## 2024-11-26 ENCOUNTER — APPOINTMENT (OUTPATIENT)
Dept: PULMONOLOGY | Facility: CLINIC | Age: 69
End: 2024-11-26
Payer: MEDICARE

## 2024-11-26 VITALS
BODY MASS INDEX: 29.23 KG/M2 | WEIGHT: 165 LBS | HEIGHT: 63 IN | OXYGEN SATURATION: 94 % | RESPIRATION RATE: 17 BRPM | HEART RATE: 89 BPM | DIASTOLIC BLOOD PRESSURE: 84 MMHG | SYSTOLIC BLOOD PRESSURE: 136 MMHG

## 2024-11-26 DIAGNOSIS — R06.02 SHORTNESS OF BREATH: ICD-10-CM

## 2024-11-26 DIAGNOSIS — J44.9 CHRONIC OBSTRUCTIVE PULMONARY DISEASE, UNSPECIFIED: ICD-10-CM

## 2024-11-26 PROCEDURE — 99214 OFFICE O/P EST MOD 30 MIN: CPT

## 2024-11-26 RX ORDER — FLUTICASONE FUROATE, UMECLIDINIUM BROMIDE AND VILANTEROL TRIFENATATE 100; 62.5; 25 UG/1; UG/1; UG/1
100-62.5-25 POWDER RESPIRATORY (INHALATION)
Qty: 1 | Refills: 11 | Status: ACTIVE | COMMUNITY
Start: 2024-11-26 | End: 1900-01-01

## 2024-12-18 ENCOUNTER — APPOINTMENT (OUTPATIENT)
Dept: PULMONOLOGY | Facility: CLINIC | Age: 69
End: 2024-12-18
Payer: MEDICARE

## 2024-12-18 VITALS
OXYGEN SATURATION: 92 % | SYSTOLIC BLOOD PRESSURE: 128 MMHG | BODY MASS INDEX: 30.12 KG/M2 | DIASTOLIC BLOOD PRESSURE: 83 MMHG | RESPIRATION RATE: 18 BRPM | HEART RATE: 89 BPM | WEIGHT: 170 LBS | HEIGHT: 63 IN

## 2024-12-18 DIAGNOSIS — J44.9 CHRONIC OBSTRUCTIVE PULMONARY DISEASE, UNSPECIFIED: ICD-10-CM

## 2024-12-18 PROCEDURE — 99214 OFFICE O/P EST MOD 30 MIN: CPT

## 2025-02-06 ENCOUNTER — APPOINTMENT (OUTPATIENT)
Dept: ENDOCRINOLOGY | Facility: CLINIC | Age: 70
End: 2025-02-06
Payer: MEDICARE

## 2025-02-06 VITALS
SYSTOLIC BLOOD PRESSURE: 129 MMHG | DIASTOLIC BLOOD PRESSURE: 80 MMHG | WEIGHT: 177 LBS | TEMPERATURE: 97.5 F | BODY MASS INDEX: 31.36 KG/M2 | HEART RATE: 84 BPM | HEIGHT: 63 IN | OXYGEN SATURATION: 92 %

## 2025-02-06 DIAGNOSIS — E27.40 UNSPECIFIED ADRENOCORTICAL INSUFFICIENCY: ICD-10-CM

## 2025-02-06 DIAGNOSIS — E03.9 HYPOTHYROIDISM, UNSPECIFIED: ICD-10-CM

## 2025-02-06 DIAGNOSIS — D35.2 BENIGN NEOPLASM OF PITUITARY GLAND: ICD-10-CM

## 2025-02-06 DIAGNOSIS — E22.1 HYPERPROLACTINEMIA: ICD-10-CM

## 2025-02-06 PROCEDURE — 99214 OFFICE O/P EST MOD 30 MIN: CPT

## 2025-06-17 ENCOUNTER — APPOINTMENT (OUTPATIENT)
Dept: PULMONOLOGY | Facility: CLINIC | Age: 70
End: 2025-06-17
Payer: MEDICARE

## 2025-06-17 VITALS
RESPIRATION RATE: 15 BRPM | OXYGEN SATURATION: 90 % | TEMPERATURE: 97 F | SYSTOLIC BLOOD PRESSURE: 136 MMHG | BODY MASS INDEX: 30.48 KG/M2 | HEART RATE: 100 BPM | HEIGHT: 63 IN | WEIGHT: 172 LBS | DIASTOLIC BLOOD PRESSURE: 81 MMHG

## 2025-06-17 PROCEDURE — 99214 OFFICE O/P EST MOD 30 MIN: CPT

## 2025-06-17 RX ORDER — ENSIFENTRINE 3 MG/2.5ML
3 SUSPENSION RESPIRATORY (INHALATION) TWICE DAILY
Qty: 60 | Refills: 11 | Status: ACTIVE | COMMUNITY
Start: 2025-06-17 | End: 1900-01-01

## 2025-07-11 ENCOUNTER — NON-APPOINTMENT (OUTPATIENT)
Age: 70
End: 2025-07-11

## 2025-08-06 ENCOUNTER — APPOINTMENT (OUTPATIENT)
Dept: ENDOCRINOLOGY | Facility: CLINIC | Age: 70
End: 2025-08-06

## 2025-08-12 ENCOUNTER — APPOINTMENT (OUTPATIENT)
Dept: ENDOCRINOLOGY | Facility: CLINIC | Age: 70
End: 2025-08-12
Payer: MEDICARE

## 2025-08-12 VITALS
OXYGEN SATURATION: 97 % | DIASTOLIC BLOOD PRESSURE: 68 MMHG | HEART RATE: 71 BPM | WEIGHT: 169 LBS | SYSTOLIC BLOOD PRESSURE: 136 MMHG | BODY MASS INDEX: 29.94 KG/M2

## 2025-08-12 DIAGNOSIS — E03.9 HYPOTHYROIDISM, UNSPECIFIED: ICD-10-CM

## 2025-08-12 DIAGNOSIS — E22.1 HYPERPROLACTINEMIA: ICD-10-CM

## 2025-08-12 DIAGNOSIS — E27.40 UNSPECIFIED ADRENOCORTICAL INSUFFICIENCY: ICD-10-CM

## 2025-08-12 DIAGNOSIS — D35.2 BENIGN NEOPLASM OF PITUITARY GLAND: ICD-10-CM

## 2025-08-12 PROCEDURE — 99215 OFFICE O/P EST HI 40 MIN: CPT

## 2025-08-12 PROCEDURE — G2211 COMPLEX E/M VISIT ADD ON: CPT

## 2025-08-26 DIAGNOSIS — E22.1 HYPERPROLACTINEMIA: ICD-10-CM

## 2025-08-26 DIAGNOSIS — E03.9 HYPOTHYROIDISM, UNSPECIFIED: ICD-10-CM

## (undated) DEVICE — DRAPE THYROID 77" X 123"

## (undated) DEVICE — TUBING SUCTION NONCONDUCTIVE 6MM X 12FT

## (undated) DEVICE — ELCTR REM POLYHESIVE ADULT PT RETURN 15FT

## (undated) DEVICE — DRAPE LARGE SHEET 72X85"

## (undated) DEVICE — PACK GENERAL MINOR

## (undated) DEVICE — DRAPE INSTRUMENT POUCH 6.75" X 11"

## (undated) DEVICE — VALVE SUCTION EVIS 160/200/240

## (undated) DEVICE — WARMING BLANKET LOWER ADULT

## (undated) DEVICE — LIJ-LERUTE VIDEO MEDIASTINOSCOPY TRAY: Type: DURABLE MEDICAL EQUIPMENT

## (undated) DEVICE — POSITIONER STRAP ARMBOARD VELCRO TS-30

## (undated) DEVICE — TRAP SPECIMEN SPUTUM 40CC

## (undated) DEVICE — NDL HYPO SAFE 18G X 1.5" (PINK)

## (undated) DEVICE — ELCTR BOVIE TIP BLADE INSULATED 2.75" EDGE

## (undated) DEVICE — ELCTR GROUNDING PAD ADULT COVIDIEN

## (undated) DEVICE — PACK MINOR WITH LAP

## (undated) DEVICE — SYR SLIP 10CC

## (undated) DEVICE — SUT VICRYL 3-0 27" SH UNDYED

## (undated) DEVICE — STOPCOCK 3 WAY

## (undated) DEVICE — DRAPE 3/4 SHEET 52X76"

## (undated) DEVICE — DRSG TELFA 3 X 8

## (undated) DEVICE — DRAPE GENERAL ENDOSCOPY

## (undated) DEVICE — SOL ANTI FOG

## (undated) DEVICE — DURABLE MEDICAL EQUIPMENT: Type: DURABLE MEDICAL EQUIPMENT

## (undated) DEVICE — ADAPTER FIBEROPTIC BRONCHOSCOPE DUAL AXIS SWIVEL

## (undated) DEVICE — BALLOON SINGLE FOR BF-UC160F

## (undated) DEVICE — WARMING BLANKET FULL ADULT

## (undated) DEVICE — FORCEP BIOPSY 1.8MM JAW X 100CM DISP

## (undated) DEVICE — DRAPE MAGNETIC INSTRUMENT MEDIUM

## (undated) DEVICE — SYR LUER LOK 20CC

## (undated) DEVICE — PREP CHLORAPREP HI-LITE ORANGE 26ML

## (undated) DEVICE — SYR LUER LOK 10CC

## (undated) DEVICE — VALVE BIOPSY BRONCHOVIDEOSCOPE

## (undated) DEVICE — DRSG TEGADERM 4X4.75"

## (undated) DEVICE — DRSG CURITY GAUZE SPONGE 4 X 4" 12-PLY

## (undated) DEVICE — NDL ASPIRATION 22G W SYR

## (undated) DEVICE — NDL ASPIRATION VIZISHOT2 21G

## (undated) DEVICE — VENODYNE/SCD SLEEVE CALF MEDIUM

## (undated) DEVICE — BRUSH CYTO DISP

## (undated) DEVICE — SOL INJ LR 1000ML

## (undated) DEVICE — DRAPE IOBAN 33" X 23"

## (undated) DEVICE — SUT MONOCRYL 4-0 27" PS-2 UNDYED

## (undated) DEVICE — SOL IRR POUR NS 0.9% 500ML